# Patient Record
Sex: FEMALE | Race: WHITE | NOT HISPANIC OR LATINO | Employment: PART TIME | URBAN - METROPOLITAN AREA
[De-identification: names, ages, dates, MRNs, and addresses within clinical notes are randomized per-mention and may not be internally consistent; named-entity substitution may affect disease eponyms.]

---

## 2017-01-03 ENCOUNTER — OFFICE VISIT (OUTPATIENT)
Dept: FAMILY MEDICINE | Facility: CLINIC | Age: 47
End: 2017-01-03

## 2017-01-03 VITALS
OXYGEN SATURATION: 100 % | HEART RATE: 80 BPM | DIASTOLIC BLOOD PRESSURE: 80 MMHG | HEIGHT: 62 IN | TEMPERATURE: 97 F | SYSTOLIC BLOOD PRESSURE: 144 MMHG | RESPIRATION RATE: 22 BRPM | BODY MASS INDEX: 33.53 KG/M2 | WEIGHT: 182.2 LBS

## 2017-01-03 DIAGNOSIS — Z01.818 PREOP GENERAL PHYSICAL EXAM: ICD-10-CM

## 2017-01-03 DIAGNOSIS — K21.9 GASTROESOPHAGEAL REFLUX DISEASE, ESOPHAGITIS PRESENCE NOT SPECIFIED: ICD-10-CM

## 2017-01-03 DIAGNOSIS — E11.9 TYPE 2 DIABETES MELLITUS WITHOUT COMPLICATION, UNSPECIFIED LONG TERM INSULIN USE STATUS: Primary | ICD-10-CM

## 2017-01-03 DIAGNOSIS — I10 ESSENTIAL HYPERTENSION WITH GOAL BLOOD PRESSURE LESS THAN 140/90: ICD-10-CM

## 2017-01-03 DIAGNOSIS — F41.9 ANXIETY: ICD-10-CM

## 2017-01-03 DIAGNOSIS — E66.09 NON MORBID OBESITY DUE TO EXCESS CALORIES: ICD-10-CM

## 2017-01-03 DIAGNOSIS — M54.50 CHRONIC BILATERAL LOW BACK PAIN WITHOUT SCIATICA: ICD-10-CM

## 2017-01-03 DIAGNOSIS — I10 BENIGN ESSENTIAL HYPERTENSION: ICD-10-CM

## 2017-01-03 DIAGNOSIS — R07.89 ATYPICAL CHEST PAIN: ICD-10-CM

## 2017-01-03 DIAGNOSIS — E11.8 TYPE 2 DIABETES MELLITUS WITH COMPLICATION, WITHOUT LONG-TERM CURRENT USE OF INSULIN (H): ICD-10-CM

## 2017-01-03 DIAGNOSIS — G89.29 CHRONIC BILATERAL LOW BACK PAIN WITHOUT SCIATICA: ICD-10-CM

## 2017-01-03 LAB
BUN SERPL-MCNC: 8 MG/DL (ref 5–24)
CALCIUM SERPL-MCNC: 9.4 MG/DL (ref 8.5–10.4)
CHLORIDE SERPLBLD-SCNC: 101 MMOL/L (ref 94–109)
CO2 SERPL-SCNC: 25 MMOL/L (ref 20–32)
CREAT SERPL-MCNC: 0.7 MG/DL (ref 0.6–1.3)
EGFR CALCULATED (BLACK REFERENCE): 116.4 ML/MIN
EGFR CALCULATED (NON BLACK REFERENCE): 96.2 ML/MIN
ERYTHROCYTE [DISTWIDTH] IN BLOOD BY AUTOMATED COUNT: 14.6 % (ref 11–14.5)
GLUCOSE SERPL-MCNC: 203 MG/DL (ref 60–109)
HBA1C MFR BLD: 8 % (ref 4.1–5.7)
HCT VFR BLD AUTO: 33.2 % (ref 35–47)
HGB BLD-MCNC: 10.3 G/DL (ref 12–16)
MCH RBC QN AUTO: 26.2 PG (ref 27–34)
MCHC RBC AUTO-ENTMCNC: 31 G/DL (ref 32–36)
MCV RBC AUTO: 85 FL (ref 80–100)
PLATELET # BLD AUTO: 331 THOU/UL (ref 140–440)
PMV BLD AUTO: 11.2 FL (ref 8.5–12.5)
POTASSIUM SERPL-SCNC: 4.2 MMOL/L (ref 3.4–5.3)
RBC # BLD AUTO: 3.93 MILL/UL (ref 3.8–5.4)
SODIUM SERPL-SCNC: 138 MMOL/L (ref 133–144)
WBC # BLD AUTO: 8.5 THOU/UL (ref 4–11)

## 2017-01-03 RX ORDER — CYCLOBENZAPRINE HCL 5 MG
5-10 TABLET ORAL 2 TIMES DAILY PRN
Qty: 30 TABLET | Refills: 0 | Status: SHIPPED | OUTPATIENT
Start: 2017-01-03 | End: 2017-01-31

## 2017-01-03 NOTE — PATIENT INSTRUCTIONS
1. Please discontinue your aspirin 7 days before your surgery  2. Please continue taking your levothyroxine as scheduled, including on the day of your surgery  3. Please discontinue all other medications the night before your surgery and the morning of your surgery   Presurgery Checklist  You are scheduled to have surgery. The healthcare staff will try to make your stay comfortable. Use the guidelines below to remind yourself what to do before surgery. Be sure to follow any specific pre-op instructions from your surgeon or nurse.   Preparing for Surgery  Ask your surgeon if you ll need a blood transfusion during surgery and if so, how to prepare for it. In some cases, you can donate blood before surgery. If needed, this blood can be given back (transfused) to you during or after surgery.  If you are having abdominal surgery, ask what you need to do to clear your bowel.  Tell your surgeon if you have allergies to any medications or foods.  Arrange for an adult family member or friend to drive you home after surgery. If possible, have someone ready to help you at home as you recover.  Call the surgeon if you get a cold, fever, sore throat, diarrhea, or other health problem just before surgery. Your surgeon can decide whether or not to postpone the surgery.  Medications  Tell your surgeon about all medications you take, including prescription and over-the-counter products such as herbal remedies and vitamins. Ask if you should continue taking them.  If you take ibuprofen, naproxen, or  blood thinners  such as aspirin, clopidogrel (Plavix), or warfarin (Coumadin), ask your surgeon whether you should stop taking them and how long before surgery you should stop.  You may be told to take antibiotics just before surgery to prevent infection. If so, follow instructions carefully on how to take them.  If you are told to take medications called anticoagulants to prevent blood clots after surgery, be sure to follow the  instructions on how to take them.  Stop Smoking  If you smoke, healing may take longer. So at least 2 week(s) before surgery, stop smoking.  Bathing or Showering Before Surgery  If instructed, wash with antibacterial soap. Afterward, do not use lotions or powders.  If you are having surgery on the head, you may be asked to shampoo with antibacterial soap. Follow instructions for doing so.  Do Not Remove Hair from the Surgery Site  Do not shave hair from the incision site, unless you are given specific instructions to do so. Usually, if hair needs to be removed, it will be done at the hospital right before surgery.  Don t Eat or Drink  Your doctor will tell you when to stop eating and drinking. If you do not follow your doctor's instructions, your procedure may be postponed or rescheduled for another day.  If your surgeon tells you to continue any medications, take them with small sips of water.  You can brush your teeth and rinse your mouth, but don t swallow any water.  Day of Surgery  Do not wear makeup. Do not use perfume, deodorant, or hairspray. Remove nail polish and artificial nails.  Leave jewelry (including rings), watches, and other valuables at home.  Be sure to bring health insurance cards or forms and a photo ID.  Bring a list of your medications (include the name, dose, how often you take them, and the time last dose was taken).  Arrive on time at the hospital or surgery facility.

## 2017-01-03 NOTE — PROGRESS NOTES
[facility] [address] [place] [address]  Phone: [phone]  Fax: [phone]    1/3/2017    Adult PRE-OP Evaluation:    Lola Arias, 1970 presents for pre-operative evaluation and assessment as requested by Dr. Conor Shah, prior to undergoing surgery/procedure for treatment of menstral complication .      Proposed procedure: D&C    Date of Surgery/ Procedure: Friday January 6, 2017  Hospital/Surgical Facility: [facility]     Primary Physician: Palla, Misbah  Type of Anesthesia Anticipated: General  History of anesthesia complications: NONE  History of  abnormal bleeding: NONE   History of blood transfusions: NO  Patient has a Health Care Directive or Living Will:  YES     Preoperative Questions   1. YES - Do you have a history of heart attack, stroke, stent, bypass or surgery on an artery in the head, neck, heart or legs?   2. NO - Do you ever have any pain or discomfort in your chest?  3. NO - Have you ever had a severe pain across the front of your chest lasting for half an hour or more?  4. NO - Do you have a history of Congestive Heart Failure?  5. NO - Are you troubled by shortness of breath when: walking on the level/ up a slight hill/ at night?  6. NO - Does your chest ever sound wheezy or whistling?  7. NO - Do you currently have a cold, bronchitis or other respiratory infection?  8. NO - Have you had a cold, bronchitis or other respiratory infection within the last 2 weeks?  9. NO - Do you usually have a cough?  10. NO - Do you sometimes get pains in the calves of your legs when you walk?  11. NO - Do you or anyone in your family have previous history of blood clots?  12. NO - Do you or does anyone in your family have a serious bleeding problem such as prolonged bleeding following surgeries or cuts?  13. NO - Have you ever had problems with anemia or been told to take iron pills?  14. NO - Have you had any abnormal blood loss such as black, tarry or  bloody stools, or abnormal vaginal bleeding?  15. NO - Have you ever had a blood transfusion?  16. NO - Have you or any of your relatives ever had problems with anesthesia?  17. NO - Do you have sleep apnea, excessive snoring or daytime drowsiness?  18. NO - Do you have any prosthetic heart valves?  19. NO - Do you have prosthetic joints?  20. NO - Is there any chance that you may be pregnant?    Patient Active Problem List   Diagnosis     Chronic back pain     Alcohol dependence in remission (H)     Diabetes mellitus, type 2 (H)     Bipolar disorder (H)     Depression with anxiety     H/O tubal ligation     Hypothyroidism     GERD (gastroesophageal reflux disease)     Tobacco dependence syndrome     Irritable colon     Displacement of lumbar intervertebral disc     Peptic ulcer     Depressed mood     Obesity     Essential hypertension         Current Outpatient Prescriptions on File Prior to Visit:  metFORMIN (GLUCOPHAGE) 1000 MG tablet Take 1 tablet (1,000 mg) by mouth 2 times daily (with meals)   STATIN NOT PRESCRIBED, INTENTIONAL, Statin not prescribed intentionally due to Other LDL wnl, ASCVD <7.5% (This option does not exclude patient from measure)   oxyCODONE-acetaminophen (PERCOCET) 5-325 MG per tablet Take 1 tablet by mouth every 6 hours as needed for pain (maximum 4 tablet(s) per day)   ferrous sulfate (IRON) 325 (65 FE) MG tablet Take 1 tablet (325 mg) by mouth 2 times daily   omeprazole (PRILOSEC) 20 MG capsule Take 1 capsule (20 mg) by mouth daily   lisinopril (PRINIVIL,ZESTRIL) 10 MG tablet Take 1 tablet (10 mg) by mouth daily   citalopram (CELEXA) 20 MG tablet Take 1 tablet (20 mg) by mouth daily   cyclobenzaprine (FLEXERIL) 5 MG tablet Take 1-2 tablets (5-10 mg) by mouth 2 times daily as needed for muscle spasms   albuterol (PROAIR HFA, PROVENTIL HFA, VENTOLIN HFA) 108 (90 BASE) MCG/ACT inhaler Inhale 2 puffs into the lungs every 6 hours as needed for shortness of breath / dyspnea or wheezing  "  hydrochlorothiazide 12.5 MG TABS Take 1 tablet (12.5 mg) by mouth daily   aspirin 81 MG chewable tablet Take 1 tablet (81 mg) by mouth daily   nitroglycerin (NITROSTAT) 0.4 MG SL tablet Place 1 tablet (0.4 mg) under the tongue every 5 minutes as needed for chest pain If you are still having symptoms after 3 doses (15 minutes) call 911.     No current facility-administered medications on file prior to visit.    OTC products: None, except as noted above    Allergies   Allergen Reactions     Nkda [No Known Drug Allergies]      Latex Allergy: NO    Social History     Social History     Marital Status: Single     Spouse Name: N/A     Number of Children: N/A     Years of Education: N/A     Social History Main Topics     Smoking status: Current Every Day Smoker -- 0.50 packs/day     Types: Cigarettes     Smokeless tobacco: Never Used      Comment: Declined CIQ, not ready to quit. 3-11-16     Alcohol Use: No     Drug Use: No     Sexual Activity: Not Asked     Other Topics Concern     None     Social History Narrative       REVIEW OF SYSTEMS:   Constitutional, HEENT, cardiovascular, pulmonary, GI, , musculoskeletal, neuro, skin, endocrine and psych systems are negative, except as otherwise noted.    EXAM:   Patient Vitals for the past 24 hrs:   BP Temp Pulse Resp SpO2 Height Weight   01/03/17 1409 161/78 mmHg 97  F (36.1  C) 80 22 100 % 5' 2.25\" (158.1 cm) 182 lb 3.2 oz (82.645 kg)     Body mass index is 33.06 kg/(m^2).  GENERAL: healthy, alert and no distress  EYES: Eyes grossly normal to inspection, extraocular movements - intact, and PERRL  HENT: ear canals- normal; TMs- normal; Nose- normal; Mouth- no ulcers, no lesions  NECK: no tenderness, no adenopathy, no asymmetry, no masses, no stiffness; thyroid- normal to palpation  RESP: lungs clear to auscultation - no rales, no rhonchi, no wheezes  CV: regular rates and rhythm, normal S1 S2, no S3 or S4 and no murmur, no click or rub -  ABDOMEN: soft, no tenderness, no  " hepatosplenomegaly, no masses, normal bowel sounds  MS: extremities- no gross deformities noted, no edema  SKIN: no suspicious lesions, no rashes  NEURO: strength and tone- normal, sensory exam- grossly normal, mentation- intact, speech- normal, reflexes- symmetric  BACK: no CVA tenderness, no paralumbar tenderness  PSYCH: Alert and oriented times 3; speech- coherent , normal rate and volume; able to articulate logical thoughts  LYMPHATICS: ant. cervical- normal, post. cervical- normal    DIAGNOSTICS:      EKG: normal axis, normal intervals, no acute ST/T changes c/w ischemia, no LVH by voltage criteria, unchanged from previous tracings    RISK ASSESSMENT:     Cardiovascular Risk:  -Patient is able to do heavy housework without chest pain.  -The patient does not have chest pain with exertion.  -Patient does not have a history of congestive heart failure.    -The patient does not have a history of stroke and does not have a history of valvular disease.    Pulmonary Risk:  -In terms of risk factors for pulmonary complication, the patient has no risk factors    Perioperative Complications:  -The patient does not have a history of bleeding or clotting problems in the past.    -The patient has not had complications from surgeries.    -The patient does not have a family history of any anesthesia or surgical complications.      IMPRESSION:   Reason for surgery/procedure:   Dilation and curettage     The proposed surgical procedure is considered INTERMEDIATE risk.    For above listed surgery and anesthesia:   Patient is at low risk for surgery/procedure and perioperative/procedure complications.    RECOMMENDATIONS:     Labs:  Hgb and A1c    Fasting:  NPO for 12 hours prior to surgery    Preop Plan:  --Approval given to proceed with proposed procedure, without further diagnostic evaluation    Medications:  Patient should hold their regular medications the morning of surgery unless otherwise instructed.    Hold aspirin 7 days  prior to surgery.  Hold ibuprofen for 5 days prior.  Hold All regualr, lispro/humalog, aspart/novolog, glulisine/apirda insulin the morning of surgery.      Misbah Palla, MD    Please contact our office if there are any further questions or information required about this patient.

## 2017-01-03 NOTE — Clinical Note
RETURN TO WORK/SCHOOL FORM    1/3/2017    Re: Lola Arias  1970      To Whom It May Concern:     Lola Arias was seen in clinic today..  She may return to work without restrictions.          Restrictions:  None      Misbah Palla, MD  1/3/2017 2:45 PM

## 2017-01-03 NOTE — MR AVS SNAPSHOT
After Visit Summary   1/3/2017    Lola Arias    MRN: [id number]           Patient Information     Date Of Birth          1970        Visit Information        Provider Department      1/3/2017 2:00 PM Palla, Misbah, MD [facility]        Today's Diagnoses     Type 2 diabetes mellitus without complication, unspecified long term insulin use status (H)    -  1     Chronic bilateral low back pain without sciatica         Gastroesophageal reflux disease, esophagitis presence not specified         Atypical chest pain         Anxiety         Benign essential hypertension         Non morbid obesity due to excess calories         Type 2 diabetes mellitus with complication (H)         Essential hypertension with goal blood pressure less than 140/90         Preop general physical exam           Care Instructions    1. Please discontinue your aspirin 7 days before your surgery  2. Please continue taking your levothyroxine as scheduled, including on the day of your surgery  3. Please discontinue all other medications the night before your surgery and the morning of your surgery   Presurgery Checklist  You are scheduled to have surgery. The healthcare staff will try to make your stay comfortable. Use the guidelines below to remind yourself what to do before surgery. Be sure to follow any specific pre-op instructions from your surgeon or nurse.   Preparing for Surgery  Ask your surgeon if you ll need a blood transfusion during surgery and if so, how to prepare for it. In some cases, you can donate blood before surgery. If needed, this blood can be given back (transfused) to you during or after surgery.  If you are having abdominal surgery, ask what you need to do to clear your bowel.  Tell your surgeon if you have allergies to any medications or foods.  Arrange for an adult family member or friend to drive you home after surgery. If possible, have someone ready to help you at home as you  recover.  Call the surgeon if you get a cold, fever, sore throat, diarrhea, or other health problem just before surgery. Your surgeon can decide whether or not to postpone the surgery.  Medications  Tell your surgeon about all medications you take, including prescription and over-the-counter products such as herbal remedies and vitamins. Ask if you should continue taking them.  If you take ibuprofen, naproxen, or  blood thinners  such as aspirin, clopidogrel (Plavix), or warfarin (Coumadin), ask your surgeon whether you should stop taking them and how long before surgery you should stop.  You may be told to take antibiotics just before surgery to prevent infection. If so, follow instructions carefully on how to take them.  If you are told to take medications called anticoagulants to prevent blood clots after surgery, be sure to follow the instructions on how to take them.  Stop Smoking  If you smoke, healing may take longer. So at least 2 week(s) before surgery, stop smoking.  Bathing or Showering Before Surgery  If instructed, wash with antibacterial soap. Afterward, do not use lotions or powders.  If you are having surgery on the head, you may be asked to shampoo with antibacterial soap. Follow instructions for doing so.  Do Not Remove Hair from the Surgery Site  Do not shave hair from the incision site, unless you are given specific instructions to do so. Usually, if hair needs to be removed, it will be done at the hospital right before surgery.  Don t Eat or Drink  Your doctor will tell you when to stop eating and drinking. If you do not follow your doctor's instructions, your procedure may be postponed or rescheduled for another day.  If your surgeon tells you to continue any medications, take them with small sips of water.  You can brush your teeth and rinse your mouth, but don t swallow any water.  Day of Surgery  Do not wear makeup. Do not use perfume, deodorant, or hairspray. Remove nail polish and  "artificial nails.  Leave jewelry (including rings), watches, and other valuables at home.  Be sure to bring health insurance cards or forms and a photo ID.  Bring a list of your medications (include the name, dose, how often you take them, and the time last dose was taken).  Arrive on time at the hospital or surgery facility.        Follow-ups after your visit        Who to contact     Please call your clinic at [phone] to:    Ask questions about your health    Make or cancel appointments    Discuss your medicines    Learn about your test results    Speak to your doctor   If you have compliments or concerns about an experience at your clinic, or if you wish to file a complaint, please contact [facility] Patient Relations at [phone] or email us at [email or web address]         Additional Information About Your Visit        Care EveryWhere ID     This is your Care EveryWhere ID. This could be used by other organizations to access your [facility] medical records  [id number]        Your Vitals Were     Pulse Temperature Respirations Height BMI (Body Mass Index) Pulse Oximetry    80 97  F (36.1  C) 22 5' 2.25\" (158.1 cm) 33.06 kg/m2 100%       Blood Pressure from Last 3 Encounters:   01/03/17 144/80   10/28/16 132/85   07/15/16 118/80    Weight from Last 3 Encounters:   01/03/17 182 lb 3.2 oz (82.645 kg)   10/28/16 183 lb (83.008 kg)   07/15/16 183 lb 3.2 oz (83.099 kg)              We Performed the Following     Basic Metabolic Panel ([facility]) - Results < 1 hr     CBC with Plt ([facility])     Hemoglobin A1c ([facility])          Where to get your medicines      These medications were sent to [facility] [address] [place] [address] [address] [facility] OF [address] [facility] [address]    Hours:  24-hours Phone:  [phone]    - cyclobenzaprine 5 MG tablet  - metFORMIN 1000 MG tablet  - omeprazole 20 MG CR capsule       Primary Care " Provider Office Phone # Fax #    Misbah Palla, [phone]        [facility] [address] [place] [address]        Thank you!     Thank you for choosing [facility]  for your care. Our goal is always to provide you with excellent care. Hearing back from our patients is one way we can continue to improve our services. Please take a few minutes to complete the written survey that you may receive in the mail after your visit with us. Thank you!             Your Updated Medication List - Protect others around you: Learn how to safely use, store and throw away your medicines at www.disposemymeds.org.          This list is accurate as of: 1/3/17  2:50 PM.  Always use your most recent med list.                   Brand Name Dispense Instructions for use    albuterol 108 (90 BASE) MCG/ACT Inhaler    PROAIR HFA/PROVENTIL HFA/VENTOLIN HFA    1 Inhaler    Inhale 2 puffs into the lungs every 6 hours as needed for shortness of breath / dyspnea or wheezing       aspirin 81 MG chewable tablet     108 tablet    Take 1 tablet (81 mg) by mouth daily       citalopram 20 MG tablet    celeXA    30 tablet    Take 1 tablet (20 mg) by mouth daily       cyclobenzaprine 5 MG tablet    FLEXERIL    30 tablet    Take 1-2 tablets (5-10 mg) by mouth 2 times daily as needed for muscle spasms       ferrous sulfate 325 (65 FE) MG tablet    IRON    60 tablet    Take 1 tablet (325 mg) by mouth 2 times daily       hydrochlorothiazide 12.5 MG Tabs tablet     30 tablet    Take 1 tablet (12.5 mg) by mouth daily       lisinopril 10 MG tablet    PRINIVIL/ZESTRIL    90 tablet    Take 1 tablet (10 mg) by mouth daily       metFORMIN 1000 MG tablet    GLUCOPHAGE    180 tablet    Take 1 tablet (1,000 mg) by mouth 2 times daily (with meals)       nitroglycerin 0.4 MG sublingual tablet    NITROSTAT    25 tablet    Place 1 tablet (0.4 mg) under the tongue every 5 minutes as needed for chest pain If you are still having  symptoms after 3 doses (15 minutes) call 911.       omeprazole 20 MG CR capsule    priLOSEC    30 capsule    Take 1 capsule (20 mg) by mouth daily       oxyCODONE-acetaminophen 5-325 MG per tablet    PERCOCET    18 tablet    Take 1 tablet by mouth every 6 hours as needed for pain (maximum 4 tablet(s) per day)       STATIN NOT PRESCRIBED (INTENTIONAL)     0 each    Statin not prescribed intentionally due to Other LDL wnl, ASCVD <7.5% (This option does not exclude patient from measure)

## 2017-01-04 ASSESSMENT — PATIENT HEALTH QUESTIONNAIRE - PHQ9: SUM OF ALL RESPONSES TO PHQ QUESTIONS 1-9: 12

## 2017-01-06 ENCOUNTER — TRANSFERRED RECORDS (OUTPATIENT)
Dept: HEALTH INFORMATION MANAGEMENT | Facility: CLINIC | Age: 47
End: 2017-01-06

## 2017-01-09 DIAGNOSIS — I10 ESSENTIAL HYPERTENSION: Primary | ICD-10-CM

## 2017-01-09 RX ORDER — HYDROCHLOROTHIAZIDE 12.5 MG/1
12.5 TABLET ORAL DAILY
Qty: 30 TABLET | Refills: 1 | Status: SHIPPED
Start: 2017-01-09 | End: 2017-04-15

## 2017-01-09 NOTE — PROGRESS NOTES
Quick Note:    Spoke to pt, gave results. Pt does not know what her work schedule is going to be like so she will call back to make a DM appointment.  ______

## 2017-01-31 DIAGNOSIS — R07.89 ATYPICAL CHEST PAIN: ICD-10-CM

## 2017-01-31 DIAGNOSIS — M54.50 CHRONIC BILATERAL LOW BACK PAIN WITHOUT SCIATICA: Primary | ICD-10-CM

## 2017-01-31 DIAGNOSIS — G89.29 CHRONIC BILATERAL LOW BACK PAIN WITHOUT SCIATICA: Primary | ICD-10-CM

## 2017-01-31 DIAGNOSIS — E66.09 NON MORBID OBESITY DUE TO EXCESS CALORIES: ICD-10-CM

## 2017-01-31 DIAGNOSIS — K21.9 GASTROESOPHAGEAL REFLUX DISEASE, ESOPHAGITIS PRESENCE NOT SPECIFIED: ICD-10-CM

## 2017-01-31 DIAGNOSIS — E11.8 TYPE 2 DIABETES MELLITUS WITH COMPLICATION (H): ICD-10-CM

## 2017-01-31 DIAGNOSIS — I10 BENIGN ESSENTIAL HYPERTENSION: ICD-10-CM

## 2017-01-31 DIAGNOSIS — I10 ESSENTIAL HYPERTENSION WITH GOAL BLOOD PRESSURE LESS THAN 140/90: ICD-10-CM

## 2017-01-31 DIAGNOSIS — I10 ESSENTIAL HYPERTENSION: ICD-10-CM

## 2017-01-31 DIAGNOSIS — F41.9 ANXIETY: ICD-10-CM

## 2017-01-31 RX ORDER — LISINOPRIL 10 MG/1
10 TABLET ORAL DAILY
Qty: 90 TABLET | Refills: 0 | Status: SHIPPED | OUTPATIENT
Start: 2017-01-31 | End: 2017-02-28

## 2017-01-31 RX ORDER — CYCLOBENZAPRINE HCL 5 MG
5-10 TABLET ORAL 2 TIMES DAILY PRN
Qty: 30 TABLET | Refills: 0 | Status: SHIPPED | OUTPATIENT
Start: 2017-01-31 | End: 2021-05-06

## 2017-01-31 RX ORDER — ASPIRIN 81 MG/1
81 TABLET, CHEWABLE ORAL DAILY
Qty: 108 TABLET | Refills: 0 | Status: SHIPPED | OUTPATIENT
Start: 2017-01-31 | End: 2017-06-05

## 2017-01-31 NOTE — PROGRESS NOTES
Preceptor Attestation:  Patient's case reviewed and discussed with Misbah Palla, MD Patient seen and discussed with the resident.. I agree with assessment and plan of care.  Supervising Physician:  Miah Kraft MD  [facility]

## 2017-02-06 DIAGNOSIS — K21.9 GASTROESOPHAGEAL REFLUX DISEASE, ESOPHAGITIS PRESENCE NOT SPECIFIED: Primary | ICD-10-CM

## 2017-02-28 DIAGNOSIS — K21.9 GASTROESOPHAGEAL REFLUX DISEASE, ESOPHAGITIS PRESENCE NOT SPECIFIED: ICD-10-CM

## 2017-02-28 DIAGNOSIS — F41.9 ANXIETY: ICD-10-CM

## 2017-02-28 DIAGNOSIS — R07.89 ATYPICAL CHEST PAIN: ICD-10-CM

## 2017-02-28 DIAGNOSIS — I10 BENIGN ESSENTIAL HYPERTENSION: ICD-10-CM

## 2017-02-28 DIAGNOSIS — E66.09 NON MORBID OBESITY DUE TO EXCESS CALORIES: ICD-10-CM

## 2017-02-28 DIAGNOSIS — I10 ESSENTIAL HYPERTENSION WITH GOAL BLOOD PRESSURE LESS THAN 140/90: ICD-10-CM

## 2017-02-28 RX ORDER — LISINOPRIL 10 MG/1
10 TABLET ORAL DAILY
Qty: 90 TABLET | Refills: 1 | Status: SHIPPED | OUTPATIENT
Start: 2017-02-28 | End: 2017-05-01

## 2017-02-28 NOTE — TELEPHONE ENCOUNTER
[facility] phone call message- patient requesting a refill:    Full Medication Name: omeprazole, lisinopril      Pharmacy confirmed as   [facility] [address] [place] [address] [place], [place] - [address] [facility] [place] [address] [address] [place] [address]  Phone: [phone] Fax: [phone]  : Yes    Additional Comments: pt reports being completely out and having severe heart burn     OK to leave a message on voice mail? Yes    Primary language: English      needed? No    Call taken on February 28, 2017 at 9:27 AM by Patria Anaya

## 2017-03-06 ENCOUNTER — COMMUNICATION - HEALTHEAST (OUTPATIENT)
Dept: SCHEDULING | Facility: CLINIC | Age: 47
End: 2017-03-06

## 2017-03-09 ENCOUNTER — RECORDS - HEALTHEAST (OUTPATIENT)
Dept: ADMINISTRATIVE | Facility: OTHER | Age: 47
End: 2017-03-09

## 2017-03-09 ENCOUNTER — OFFICE VISIT (OUTPATIENT)
Dept: FAMILY MEDICINE | Facility: CLINIC | Age: 47
End: 2017-03-09

## 2017-03-09 VITALS
OXYGEN SATURATION: 100 % | BODY MASS INDEX: 31.65 KG/M2 | HEIGHT: 62 IN | WEIGHT: 172 LBS | SYSTOLIC BLOOD PRESSURE: 121 MMHG | DIASTOLIC BLOOD PRESSURE: 68 MMHG | TEMPERATURE: 97.3 F | HEART RATE: 99 BPM

## 2017-03-09 DIAGNOSIS — E11.8 TYPE 2 DIABETES MELLITUS WITH COMPLICATION, WITHOUT LONG-TERM CURRENT USE OF INSULIN (H): ICD-10-CM

## 2017-03-09 DIAGNOSIS — S06.0X0A CONCUSSION, WITHOUT LOSS OF CONSCIOUSNESS, INITIAL ENCOUNTER: Primary | ICD-10-CM

## 2017-03-09 DIAGNOSIS — H73.892 TYMPANIC MEMBRANE RETRACTION, LEFT: ICD-10-CM

## 2017-03-09 RX ORDER — OXYMETAZOLINE HYDROCHLORIDE 0.05 G/100ML
2-3 SPRAY NASAL 2 TIMES DAILY PRN
Qty: 1 BOTTLE | Refills: 0 | Status: SHIPPED | OUTPATIENT
Start: 2017-03-09 | End: 2017-05-17

## 2017-03-09 NOTE — PROGRESS NOTES
SUBJECTIVE:  This is a 46-year-old female with a history of chronic back pain, hypothyroidism, hypertension and alcoholism in remission, who presents 5 days after a fall.  She was sitting at a bar with her boyfriend and that she had hop up for stool to give her boyfriend hug she slipped and fell and hit the back of her head on the brass rail under the bar.  She had not been drinking any alcohol, but just lost her footing.  She thinks she may have lost consciousness briefly when she woke up she had a nosebleed.  She has had pain in her left ear and near her left upper jaw since that time.  She sometimes finds it difficult to sustain her concentration at work and she thought her vision may have been blurry right after the injury but has been normal since that time.  She has been using Norco she had left over from a D&C procedure in January and 1 pill at night due to the headache and left ear discomfort.  She has not sought medical evaluation.  Today she has no visual complaints.  She does not have a headache now, but it does come on and off and seems to bother her more in the evening.  She has been able chew with a normal appetite, no nausea or vomiting.  No, dizziness, lightheadedness or orthostatic symptoms.  No bowel or bladder dysfunction, no neck pain.  No other injuries, no pain in her shoulders, wrists, arms, hands and lower extremities.   REVIEW OF SYSTEMS:  She has not had recent respiratory symptoms.  Remainder of the review of systems is outlined above.   PHYSICAL EXAMINATION:   VITAL SIGNS:  As above.   GENERAL:  She is alert, no distress, relates her own history, she is here with her daughter.   HEENT:  Head is normocephalic and no signs of trauma.  The skin of his scalp appears normal.  There are no palpable swellings.  Nose exam is unremarkable.  Her left tympanic membrane is retracted and there is a small  amount of tympanic membrane injection.  The right TM is normal, palpation of the jaw is  unremarkable, she can fully open and close her jaw without pain.  There is no clicking.  No tonsillar hypertrophy or exudate.   NECK:  Supple without lymphadenopathy.  There is no cervical tenderness.  She can turn either side without discomfort.   CARDIOVASCULAR:  Regular rate and rhythm without murmur.   LUNGS:  Clear to auscultation bilaterally.   EXTREMITIES:  Free of gross deformity.   ASSESSMENT/PLAN:     1.  Concussion:  We discussed various evaluation and treatment options.  She consents to a head CT as she had a fall from her own height with potential loss of consciousness and ongoing headache now for 5 days.  This will be set up for some time, likely in the next 24 hours.  I think the likelihood of intracerebral hemorrhage or other significant injuries low but will evaluate.   2.  I recommend not using opioid muscle relaxers for this pain.  Recommend reducing the amount of time she spends in front of a screen and reading.  I gave her report of workability so that she may look away from her screen for a third of every hour over the next 2 weeks.  I expect slow resolution of her symptoms.  If she has any worsening of her headache, any new neurologic symptoms or other new concerns she should seek immediate reevaluation.   3.  Left ear discomfort.  No signs of trauma and it seems at this point unlikely related to her fall directly.  She does have a clear ear retraction recommend a trial of Afrin nasal spray for eustachian tube dysfunction.  I expect resolution over the next 3-5 days   4.  She return for reevaluation no later than 2 weeks, earlier if any new symptoms.

## 2017-03-09 NOTE — MR AVS SNAPSHOT
After Visit Summary   3/9/2017    Lola Arias    MRN: [id number]           Patient Information     Date Of Birth          1970        Visit Information        Provider Department      3/9/2017 2:40 PM Oleg Patino MD [facility]        Today's Diagnoses     Type 2 diabetes mellitus with complication, without long-term current use of insulin (H)    -  1    Concussion, without loss of consciousness, initial encounter          Care Instructions    - Nasal spray to each nostril 2 times daily for 3 days and then stop.    Your medication list is printed, please keep this with you, it is helpful to bring this current list to any other medical appointments, the emergency room or hospital.    If you had lab testing today and your results are reassuring or normal they will be be mailed to you within 7 days.     If the lab tests need quick action we will call you with the results.   The phone number we will call with results is # [phone] (home) . If this is not the best number please call our clinic and change the number.    If you need any refills please call your pharmacy and they will contact us.    If you have any further concerns or wish to schedule another appointment you must call our office during normal business hours  [phone] (8-5:00 M-F)  If you have urgent medical questions that cannot wait  you may also call [phone] at any time of day.  If you have a medical emergency please call [phone].      Thank you for coming to [facility].    Referral for ( TEST )  :  Ophthalmology  LOCATION/PLACE/Provider : [facility] location  DATE & TIME : 3/31/2017 at 2:00pm with Dr. Casillas  PHONE :  [phone]  ADDITIONAL INFORMATION :  Appointment date and time card given to patient  Appointment made by clinic staff/:  ---CTruy,ZAK                Follow-ups after your visit        Additional Services     OPHTHALMOLOGY ADULT REFERRAL       [facility]  "[place]    Diagnosis/Reason for Referral:  DM routine eye exam     needed: No  Language: English    May leave message on voicemail: Yes    Referral should be tracked? Yes                  Future tests that were ordered for you today     Open Future Orders        Priority Expected Expires Ordered    CT HEAD W CONTRAST Routine  3/9/2018 3/9/2017            Who to contact     Please call your clinic at [phone] to:    Ask questions about your health    Make or cancel appointments    Discuss your medicines    Learn about your test results    Speak to your doctor   If you have compliments or concerns about an experience at your clinic, or if you wish to file a complaint, please contact [facility] Patient Relations at [phone] or email us at [email or web address]         Additional Information About Your Visit        [employer] Information     [employer] is an electronic gateway that provides easy, online access to your medical records. With [employer], you can request a clinic appointment, read your test results, renew a prescription or communicate with your care team.     To sign up for [employer] visit the website at [email or web address]   You will be asked to enter the access code listed below, as well as some personal information. Please follow the directions to create your username and password.     Your access code is: [email or web address]  Expires: 2017  4:01 PM     Your access code will  in 90 days. If you need help or a new code, please contact your [facility] or call [phone] for assistance.        Care EveryWhere ID     This is your Care EveryWhere ID. This could be used by other organizations to access your [facility] medical records  [id number]        Your Vitals Were     Pulse Temperature Height Pulse Oximetry BMI (Body Mass Index)       99 97.3  F (36.3  C) (Oral) 5' 2.4\" (158.5 cm) 100% 31.06 kg/m2        Blood " Pressure from Last 3 Encounters:   03/09/17 121/68   01/03/17 144/80   10/28/16 132/85    Weight from Last 3 Encounters:   03/09/17 172 lb (78 kg)   01/03/17 182 lb 3.2 oz (82.6 kg)   10/28/16 183 lb (83 kg)              We Performed the Following     OPHTHALMOLOGY ADULT REFERRAL        Primary Care Provider Office Phone # Fax #    Misbah Palla, [phone]       [facility] [address] [place] [address]        Thank you!     Thank you for choosing [facility]  for your care. Our goal is always to provide you with excellent care. Hearing back from our patients is one way we can continue to improve our services. Please take a few minutes to complete the written survey that you may receive in the mail after your visit with us. Thank you!             Your Updated Medication List - Protect others around you: Learn how to safely use, store and throw away your medicines at www.disposemymeds.org.          This list is accurate as of: 3/9/17  4:01 PM.  Always use your most recent med list.                   Brand Name Dispense Instructions for use    albuterol 108 (90 BASE) MCG/ACT Inhaler    PROAIR HFA/PROVENTIL HFA/VENTOLIN HFA    1 Inhaler    Inhale 2 puffs into the lungs every 6 hours as needed for shortness of breath / dyspnea or wheezing       aspirin 81 MG chewable tablet     108 tablet    Take 1 tablet (81 mg) by mouth daily       citalopram 20 MG tablet    celeXA    30 tablet    Take 1 tablet (20 mg) by mouth daily       cyclobenzaprine 5 MG tablet    FLEXERIL    30 tablet    Take 1-2 tablets (5-10 mg) by mouth 2 times daily as needed for muscle spasms       ferrous sulfate 325 (65 FE) MG tablet    IRON    60 tablet    Take 1 tablet (325 mg) by mouth 2 times daily       hydrochlorothiazide 12.5 MG Tabs tablet     30 tablet    Take 1 tablet (12.5 mg) by mouth daily       lisinopril 10 MG tablet    PRINIVIL/ZESTRIL    90 tablet    Take 1 tablet (10 mg) by mouth daily        metFORMIN 1000 MG tablet    GLUCOPHAGE    180 tablet    Take 1 tablet (1,000 mg) by mouth 2 times daily (with meals)       nitroglycerin 0.4 MG sublingual tablet    NITROSTAT    25 tablet    Place 1 tablet (0.4 mg) under the tongue every 5 minutes as needed for chest pain If you are still having symptoms after 3 doses (15 minutes) call 911.       omeprazole 20 MG CR capsule    priLOSEC    30 capsule    Take 1 capsule (20 mg) by mouth daily       oxyCODONE-acetaminophen 5-325 MG per tablet    PERCOCET    18 tablet    Take 1 tablet by mouth every 6 hours as needed for pain (maximum 4 tablet(s) per day)       STATIN NOT PRESCRIBED (INTENTIONAL)     0 each    Statin not prescribed intentionally due to Other LDL wnl, ASCVD <7.5% (This option does not exclude patient from measure)

## 2017-03-09 NOTE — PATIENT INSTRUCTIONS
- Nasal spray to each nostril 2 times daily for 3 days and then stop.    Your medication list is printed, please keep this with you, it is helpful to bring this current list to any other medical appointments, the emergency room or hospital.    If you had lab testing today and your results are reassuring or normal they will be be mailed to you within 7 days.     If the lab tests need quick action we will call you with the results.   The phone number we will call with results is # [phone] (home) . If this is not the best number please call our clinic and change the number.    If you need any refills please call your pharmacy and they will contact us.    If you have any further concerns or wish to schedule another appointment you must call our office during normal business hours  [phone] (8-5:00 M-F)  If you have urgent medical questions that cannot wait  you may also call [phone] at any time of day.  If you have a medical emergency please call 911.      Thank you for coming to [facility].    Referral for ( TEST )  :  Ophthalmology  LOCATION/PLACE/Provider : [facility] location  DATE & TIME : 3/31/2017 at 2:00pm with Dr. Casillas  PHONE :  [phone]  ADDITIONAL INFORMATION :  Appointment date and time card given to patient  Appointment made by clinic staff/:  ---ZAK [facility]        Referral for ( TEST )  :      CT Head W contrast  LOCATION/PLACE/Provider :    [address] [address]  DATE & TIME :     3-10-17 at Noon  PHONE :     [phone]  FAX :     [phone]  ADDITIONAL INFORMATION :     NPO 2 hrs before appointment time  Appointment made by clinic staff/:    Joon

## 2017-03-09 NOTE — LETTER
[facility] [address] [place] [address]  [phone]    REPORT OF WORK ABILITY    Date: 3/9/2017                     Employee Name: Lola Arias         YOB: 1970  Medical Record Number: [id number]   Soc.Sec.No: [id number]    Diagnosis: Concussion  Work Related: no       EMPLOYEE IS ABLE TO WORK: OFF work for today     RESTRICTIONS From 03/09/2017-03/23/17, Return to work no restrictions 03/24/17:   Stand: Frequently (4-6 hours)   Sit: Continuously (6-8 hours)   Walk: Frequently (4-6 hours)   Kneel/[place]: Frequently (4-6 hours)      Lift, carry no more than:  20 - 50 lb. Occasionally (2-4 hours)   May use hands repetitively for:    Simple grasping: yes      Pushing/pulling: yes    Fine manipulation:  yes    Firm grasping:  yes     Operate Foot controls:  Right foot: yes   Left foot: yes     Bend:  Occasionally (2-4 hours)     Stoop: Occasionally (2-4 hours)    Twist: Occasionally (2-4 hours)     Reach Above Shoulders: Occasionally (2-4 hours)    OTHER RESTRICTIONS: SHE MAY ONLY VIEW COMPUTER SCREEN 40 MINUTES OF EVERY HOUR.  20 MINUTES OF EVERY HOURS SPENT AWAY FROM LOOKING AT COMPUTER SCREEN/CLOSE READING.    TREATMENT PLAN/NOTES: Return to clinic in 2 weeks if symptoms not resolved.      Oleg Patino MD

## 2017-03-09 NOTE — NURSING NOTE
DUE FOR:  Eye Exam referral--- placed order.   Labs due:Microalbumin  DM Foot exam  PHQ-9-- not given due to current pain issues causing patient to not feel like doing anything.   Cervical Cancer Screening--- Pt informed and will come back at future date.   Pneumococcal 23 vaccine--- defer until next time.

## 2017-03-09 NOTE — PROGRESS NOTES
"3/3/17  At bar with boyfriend, fell backward off of stool, hit back of head on foot thing under bar and then on concrete  Thinks lost consciousness x1 minute, got a nose bleed  Headaches since then, left ear pain and jaw pain   Hard time focusing, \"spacey\"   No dizziness or lightheadedness   Blurry vision on Monday, better now   Wasn't drinking that night, sober now for 3/30/2014, used to drink 18-24 per night   Using pain medicine - Norco - 1 pill/day, combines with muscle relaxer     "

## 2017-03-10 ENCOUNTER — HOSPITAL ENCOUNTER (OUTPATIENT)
Dept: CT IMAGING | Facility: HOSPITAL | Age: 47
Discharge: HOME | End: 2017-03-10
Attending: FAMILY MEDICINE

## 2017-03-10 DIAGNOSIS — S06.0XAA CONCUSSION: ICD-10-CM

## 2017-04-07 ENCOUNTER — TRANSFERRED RECORDS (OUTPATIENT)
Dept: HEALTH INFORMATION MANAGEMENT | Facility: CLINIC | Age: 47
End: 2017-04-07

## 2017-04-10 DIAGNOSIS — S06.0X0A CONCUSSION, WITHOUT LOSS OF CONSCIOUSNESS, INITIAL ENCOUNTER: ICD-10-CM

## 2017-04-10 NOTE — LETTER
April 14, 2017      Lola Arias  [address] [facility] [address]        Dear Lola,    Your head CT on 03/10/17 was normal.    If you have any questions, please call the clinic to make an appointment.    Sincerely,    ARGELIA Levine

## 2017-04-15 DIAGNOSIS — E11.9 TYPE 2 DIABETES MELLITUS WITHOUT COMPLICATION, UNSPECIFIED LONG TERM INSULIN USE STATUS: ICD-10-CM

## 2017-04-15 DIAGNOSIS — I10 ESSENTIAL HYPERTENSION: ICD-10-CM

## 2017-04-15 RX ORDER — HYDROCHLOROTHIAZIDE 12.5 MG/1
12.5 TABLET ORAL DAILY
Qty: 30 TABLET | Refills: 1 | Status: SHIPPED | OUTPATIENT
Start: 2017-04-15 | End: 2017-05-17

## 2017-04-15 NOTE — PROGRESS NOTES
Clinic Triage Call:  Pt called stating she was completely out of her metformin 1000mg BID and HCTZ 12.5mg daily. Reviewed chart and she is due for refills. She would like these started today as opposed to waiting until Monday. Refills sent by e-prescribe now.     She is aware she needs to come in for a physical exam in the next 1-2 months.    Geri Sutton MD

## 2017-04-19 ENCOUNTER — OFFICE VISIT (OUTPATIENT)
Dept: FAMILY MEDICINE | Facility: CLINIC | Age: 47
End: 2017-04-19

## 2017-04-19 VITALS
HEART RATE: 86 BPM | WEIGHT: 170 LBS | DIASTOLIC BLOOD PRESSURE: 87 MMHG | OXYGEN SATURATION: 100 % | SYSTOLIC BLOOD PRESSURE: 134 MMHG | TEMPERATURE: 98 F | BODY MASS INDEX: 31.28 KG/M2 | HEIGHT: 62 IN

## 2017-04-19 DIAGNOSIS — J06.9 VIRAL URI WITH COUGH: Primary | ICD-10-CM

## 2017-04-19 DIAGNOSIS — J98.01 ACUTE BRONCHOSPASM: ICD-10-CM

## 2017-04-19 RX ORDER — ALBUTEROL SULFATE 90 UG/1
2 AEROSOL, METERED RESPIRATORY (INHALATION) EVERY 6 HOURS PRN
Qty: 1 INHALER | Refills: 1 | Status: SHIPPED | OUTPATIENT
Start: 2017-04-19 | End: 2018-02-07

## 2017-04-19 NOTE — PATIENT INSTRUCTIONS
~ Will send a refill of your Albuterol inhaler.   ~Take 2 puffs three times a day for 5 days then you can resume to using this as needed.   ~You can take Robitussin to help with your cough.   Your medication list is printed, please keep this with you, it is helpful to bring this current list to any other medical appointments, the emergency room or hospital.    If you had lab testing today and your results are reassuring or normal they will be be mailed to you within 7 days.     If the lab tests need quick action we will call you with the results.   The phone number we will call with results is # [phone] (home) . If this is not the best number please call our clinic and change the number.    If you need any refills please call your pharmacy and they will contact us.    If you have any further concerns or wish to schedule another appointment you must call our office during normal business hours  [phone] (8-5:00 M-F)  If you have urgent medical questions that cannot wait  you may also call [phone] at any time of day.  If you have a medical emergency please call [phone].    Thank you for coming to [facility].

## 2017-04-19 NOTE — PROGRESS NOTES
SUBJECTIVE:  This is a 46-year-old female with type 2 diabetes, alcohol dependence in remission, bipolar affective disorder, hypothyroidism who presents with 2 days of cough.  She was in her usual state of health which includes an occasional intermittent cough until 2 days ago.  She reports that her cat and a family member developed a runny nose and cough over the past few days and now she seems to have the same cough.  She has had some clear runny nose and no other associated symptoms.  Upon review of systems she does endorse she may have had chills 2 nights ago.  No body aches, joint pain, sore throat, ear pain, facial pain, headache, bruising or bleeding.  She has reduced her cigarette smoking to 3 cigarettes a day today.   REVIEW OF SYSTEMS:  No fever, no shortness of breath, no orthopnea.  No change in exercise tolerance.  The remainder of the review of systems is outlined above.   PHYSICAL EXAMINATION:   VITAL SIGNS:  As above.   GENERAL:  She is alert, no distress.   HEENT:  Head is normocephalic and atraumatic.  Eyes, sclerae are nonicteric, noninjected.  Tympanic membranes are white with normal bony and light landmarks.  She has swelling of the inferior nasal turbinates with clear rhinorrhea.  She has mild erythema of the posterior oropharynx without tonsillar hypertrophy.   NECK:  Supple without lymphadenopathy.   CARDIOVASCULAR:  Regular rate and rhythm without murmur.   LUNGS:  Clear with fair air exchange bilaterally.   ABDOMEN:  Soft and nontender.   EXTREMITIES:  Free of edema.   ASSESSMENT AND PLAN:   1.  Viral upper respiratory infection:  Recommended symptomatic cares.  She has an albuterol inhaler previously prescribed although she denies any history of asthma.  She denies episodes of cough or wheezing, other than around times when she is sick.  She may use her Albuterol inhaler 2 puffs 3 times daily over the next few days until her upper respiratory infection clears.  I anticipate spontaneous  resolution over the next 5-7 days of her acute symptoms and the cough to be completely resolved within 2-3 weeks.  If she has any worsening or cough that extends beyond 3 weeks she should return for reevaluation.  Recommend smoking cessation as the foundation of reducing and preventing long-term respiratory symptoms.   2.  Tobacco abuse:  Recommend tobacco cessation and offered smoking cessation options.

## 2017-04-19 NOTE — NURSING NOTE
Informed patient of being due for a physical exam check up.  Due for Mammogram,Colonoscopy, PAP. Pt will call next week when at work when she knows her schedule.   Patient states that she does not have Asthma.   ---TitaCMA

## 2017-04-19 NOTE — MR AVS SNAPSHOT
After Visit Summary   4/19/2017    Lola Arias    MRN: [id number]           Patient Information     Date Of Birth          1970        Visit Information        Provider Department      4/19/2017 1:40 PM Oleg Patino MD [facility]        Today's Diagnoses     Acute bronchospasm          Care Instructions    ~ Will send a refill of your Albuterol inhaler.   ~Take 2 puffs three times a day for 5 days then you can resume to using this as needed.   ~You can take Robitussin to help with your cough.   Your medication list is printed, please keep this with you, it is helpful to bring this current list to any other medical appointments, the emergency room or hospital.    If you had lab testing today and your results are reassuring or normal they will be be mailed to you within 7 days.     If the lab tests need quick action we will call you with the results.   The phone number we will call with results is # [phone] (home) . If this is not the best number please call our clinic and change the number.    If you need any refills please call your pharmacy and they will contact us.    If you have any further concerns or wish to schedule another appointment you must call our office during normal business hours  [phone] (8-5:00 M-F)  If you have urgent medical questions that cannot wait  you may also call [phone] at any time of day.  If you have a medical emergency please call [phone].    Thank you for coming to [facility].          Follow-ups after your visit        Who to contact     Please call your clinic at [phone] to:    Ask questions about your health    Make or cancel appointments    Discuss your medicines    Learn about your test results    Speak to your doctor   If you have compliments or concerns about an experience at your clinic, or if you wish to file a complaint, please contact [facility] Patient Relations at [phone] or  "email us at [email or web address]         Additional Information About Your Visit        [employer] Information     [employer] is an electronic gateway that provides easy, online access to your medical records. With [employer], you can request a clinic appointment, read your test results, renew a prescription or communicate with your care team.     To sign up for [employer] visit the website at [email or web address]   You will be asked to enter the access code listed below, as well as some personal information. Please follow the directions to create your username and password.     Your access code is: [email or web address]  Expires: 2017  5:01 PM     Your access code will  in 90 days. If you need help or a new code, please contact your [facility] or call [phone] for assistance.        Care EveryWhere ID     This is your Care EveryWhere ID. This could be used by other organizations to access your [place] medical records  [id number]        Your Vitals Were     Pulse Temperature Height Pulse Oximetry BMI (Body Mass Index)       86 98  F (36.7  C) (Oral) 5' 2\" (157.5 cm) 100% 31.09 kg/m2        Blood Pressure from Last 3 Encounters:   17 134/87   17 121/68   17 144/80    Weight from Last 3 Encounters:   17 170 lb (77.1 kg)   17 172 lb (78 kg)   17 182 lb 3.2 oz (82.6 kg)              Today, you had the following     No orders found for display         Where to get your medicines      These medications were sent to [facility] [address] [place] [address]  [address] AT [facility]  [address], [facility] [address]    Hours:  24-hours Phone:  [phone]     albuterol 108 (90 BASE) MCG/ACT Inhaler          Primary Care Provider Office Phone # Fax #    Misbah Palla, [phone]       [facility] [address] [place] [address]        Thank you!     Thank you for choosing " [facility]  for your care. Our goal is always to provide you with excellent care. Hearing back from our patients is one way we can continue to improve our services. Please take a few minutes to complete the written survey that you may receive in the mail after your visit with us. Thank you!             Your Updated Medication List - Protect others around you: Learn how to safely use, store and throw away your medicines at www.disposemymeds.org.          This list is accurate as of: 4/19/17  2:09 PM.  Always use your most recent med list.                   Brand Name Dispense Instructions for use    albuterol 108 (90 BASE) MCG/ACT Inhaler    PROAIR HFA/PROVENTIL HFA/VENTOLIN HFA    1 Inhaler    Inhale 2 puffs into the lungs every 6 hours as needed for shortness of breath / dyspnea or wheezing       aspirin 81 MG chewable tablet     108 tablet    Take 1 tablet (81 mg) by mouth daily       citalopram 20 MG tablet    celeXA    30 tablet    Take 1 tablet (20 mg) by mouth daily       cyclobenzaprine 5 MG tablet    FLEXERIL    30 tablet    Take 1-2 tablets (5-10 mg) by mouth 2 times daily as needed for muscle spasms       ferrous sulfate 325 (65 FE) MG tablet    IRON    60 tablet    Take 1 tablet (325 mg) by mouth 2 times daily       hydrochlorothiazide 12.5 MG Tabs tablet     30 tablet    Take 1 tablet (12.5 mg) by mouth daily       lisinopril 10 MG tablet    PRINIVIL/ZESTRIL    90 tablet    Take 1 tablet (10 mg) by mouth daily       metFORMIN 1000 MG tablet    GLUCOPHAGE    180 tablet    Take 1 tablet (1,000 mg) by mouth 2 times daily (with meals)       nitroglycerin 0.4 MG sublingual tablet    NITROSTAT    25 tablet    Place 1 tablet (0.4 mg) under the tongue every 5 minutes as needed for chest pain If you are still having symptoms after 3 doses (15 minutes) call 911.       omeprazole 20 MG CR capsule    priLOSEC    30 capsule    Take 1 capsule (20 mg) by mouth daily       oxyCODONE-acetaminophen 5-325 MG per  tablet    PERCOCET    18 tablet    Take 1 tablet by mouth every 6 hours as needed for pain (maximum 4 tablet(s) per day)       oxymetazoline 0.05 % spray    AFRIN NASAL SPRAY    1 Bottle    Spray 2-3 sprays into both nostrils 2 times daily as needed for congestion       STATIN NOT PRESCRIBED (INTENTIONAL)     0 each    Statin not prescribed intentionally due to Other LDL wnl, ASCVD <7.5% (This option does not exclude patient from measure)

## 2017-04-26 DIAGNOSIS — K21.9 GASTROESOPHAGEAL REFLUX DISEASE, ESOPHAGITIS PRESENCE NOT SPECIFIED: ICD-10-CM

## 2017-05-01 DIAGNOSIS — E66.09 NON MORBID OBESITY DUE TO EXCESS CALORIES: ICD-10-CM

## 2017-05-01 DIAGNOSIS — K21.9 GASTROESOPHAGEAL REFLUX DISEASE, ESOPHAGITIS PRESENCE NOT SPECIFIED: ICD-10-CM

## 2017-05-01 DIAGNOSIS — I10 BENIGN ESSENTIAL HYPERTENSION: ICD-10-CM

## 2017-05-01 DIAGNOSIS — I10 ESSENTIAL HYPERTENSION WITH GOAL BLOOD PRESSURE LESS THAN 140/90: ICD-10-CM

## 2017-05-01 DIAGNOSIS — R07.89 ATYPICAL CHEST PAIN: ICD-10-CM

## 2017-05-01 DIAGNOSIS — F41.9 ANXIETY: ICD-10-CM

## 2017-05-01 RX ORDER — LISINOPRIL 10 MG/1
10 TABLET ORAL DAILY
Qty: 90 TABLET | Refills: 0 | Status: SHIPPED | OUTPATIENT
Start: 2017-05-01 | End: 2018-03-13

## 2017-05-17 ENCOUNTER — OFFICE VISIT (OUTPATIENT)
Dept: FAMILY MEDICINE | Facility: CLINIC | Age: 47
End: 2017-05-17

## 2017-05-17 VITALS
OXYGEN SATURATION: 97 % | SYSTOLIC BLOOD PRESSURE: 135 MMHG | BODY MASS INDEX: 32.54 KG/M2 | HEIGHT: 62 IN | TEMPERATURE: 97.8 F | DIASTOLIC BLOOD PRESSURE: 84 MMHG | WEIGHT: 176.8 LBS | HEART RATE: 80 BPM

## 2017-05-17 DIAGNOSIS — G89.29 CHRONIC RIGHT-SIDED LOW BACK PAIN WITHOUT SCIATICA: ICD-10-CM

## 2017-05-17 DIAGNOSIS — M54.50 CHRONIC RIGHT-SIDED LOW BACK PAIN WITHOUT SCIATICA: ICD-10-CM

## 2017-05-17 DIAGNOSIS — I10 BENIGN ESSENTIAL HYPERTENSION: ICD-10-CM

## 2017-05-17 DIAGNOSIS — E11.8 TYPE 2 DIABETES MELLITUS WITH COMPLICATION, WITHOUT LONG-TERM CURRENT USE OF INSULIN (H): Primary | ICD-10-CM

## 2017-05-17 DIAGNOSIS — E03.9 HYPOTHYROIDISM, UNSPECIFIED TYPE: ICD-10-CM

## 2017-05-17 DIAGNOSIS — F31.71 BIPOLAR DISORDER, IN PARTIAL REMISSION, MOST RECENT EPISODE HYPOMANIC (H): ICD-10-CM

## 2017-05-17 DIAGNOSIS — F41.9 ANXIETY: ICD-10-CM

## 2017-05-17 DIAGNOSIS — K21.9 GASTROESOPHAGEAL REFLUX DISEASE, ESOPHAGITIS PRESENCE NOT SPECIFIED: ICD-10-CM

## 2017-05-17 DIAGNOSIS — Z00.00 ROUTINE GENERAL MEDICAL EXAMINATION AT A HEALTH CARE FACILITY: ICD-10-CM

## 2017-05-17 PROBLEM — R87.89 OTHER ABNORMAL FINDINGS IN SPECIMENS FROM FEMALE GENITAL ORGANS: Status: ACTIVE | Noted: 2017-05-17

## 2017-05-17 LAB
CHOLEST SERPL-MCNC: 142 MG/DL
CHOLEST/HDLC SERPL: 2.2 RATIO
GLUCOSE P FAST SERPL-MCNC: 124 MG/DL (ref 51–110)
HBA1C MFR BLD: 7.5 % (ref 4.1–5.7)
HDLC SERPL-MCNC: 65 MG/DL
LDLC SERPL CALC-MCNC: 62 MG/DL (ref 0–99)
TRIGL SERPL-MCNC: 77 MG/DL
TSH SERPL DL<=0.05 MIU/L-ACNC: 2.43 UIU/ML (ref 0.3–5)
VLDL-CHOLESTEROL: 15 MG/DL (ref 7–32)

## 2017-05-17 RX ORDER — LAMOTRIGINE 200 MG/1
200 TABLET ORAL DAILY
Qty: 30 TABLET | Refills: 3 | Status: SHIPPED | OUTPATIENT
Start: 2017-06-29 | End: 2021-03-30

## 2017-05-17 RX ORDER — TIZANIDINE 2 MG/1
2-4 TABLET ORAL 3 TIMES DAILY PRN
Qty: 90 TABLET | Refills: 0 | Status: SHIPPED | OUTPATIENT
Start: 2017-05-17 | End: 2021-05-06

## 2017-05-17 RX ORDER — HYDROCHLOROTHIAZIDE 12.5 MG/1
12.5 TABLET ORAL DAILY
Qty: 90 TABLET | Refills: 3 | Status: SHIPPED | OUTPATIENT
Start: 2017-05-17 | End: 2020-08-19

## 2017-05-17 RX ORDER — LAMOTRIGINE 25 MG/1
TABLET ORAL
Qty: 98 TABLET | Refills: 0 | Status: SHIPPED | OUTPATIENT
Start: 2017-05-17 | End: 2017-07-05

## 2017-05-17 NOTE — MR AVS SNAPSHOT
After Visit Summary   2017    Lola Arias    MRN: [id number]           Patient Information     Date Of Birth          1970        Visit Information        Provider Department      2017 8:40 AM Edgardo Gomez MD [facility]        Today's Diagnoses     Type 2 diabetes mellitus with complication, without long-term current use of insulin (H)    -  1    Benign essential hypertension        Bipolar disorder, in partial remission, most recent episode hypomanic (H)        Gastroesophageal reflux disease, esophagitis presence not specified        Anxiety        Chronic right-sided low back pain without sciatica        Hypothyroidism, unspecified type           Follow-ups after your visit        Who to contact     Please call your clinic at [phone] to:    Ask questions about your health    Make or cancel appointments    Discuss your medicines    Learn about your test results    Speak to your doctor   If you have compliments or concerns about an experience at your clinic, or if you wish to file a complaint, please contact [facility] Patient Relations at [phone] or email us at [email or web address]         Additional Information About Your Visit        MyChart Information     [employer] is an electronic gateway that provides easy, online access to your medical records. With [employer], you can request a clinic appointment, read your test results, renew a prescription or communicate with your care team.     To sign up for [employer] visit the website at [email or web address]   You will be asked to enter the access code listed below, as well as some personal information. Please follow the directions to create your username and password.     Your access code is: [email or web address]  Expires: 2017  5:01 PM     Your access code will  in 90 days. If you need help or a new code, please contact your [facility]  "Clinic or call [phone] for assistance.        Care EveryWhere ID     This is your Care EveryWhere ID. This could be used by other organizations to access your [place] medical records  [id number]        Your Vitals Were     Pulse Temperature Height Pulse Oximetry BMI (Body Mass Index)       80 97.8  F (36.6  C) (Oral) 5' 2\" (157.5 cm) 97% 32.34 kg/m2        Blood Pressure from Last 3 Encounters:   05/17/17 135/84   04/19/17 134/87   03/09/17 121/68    Weight from Last 3 Encounters:   05/17/17 176 lb 12.8 oz (80.2 kg)   04/19/17 170 lb (77.1 kg)   03/09/17 172 lb (78 kg)              We Performed the Following     Gucose Fasting ([facility])     Hemoglobin A1c ([facility])     Lipid Panel ([facility]) - Results < 1 hr     TSH  Sensitive ([facility])          Today's Medication Changes          These changes are accurate as of: 5/17/17  9:31 AM.  If you have any questions, ask your nurse or doctor.               Start taking these medicines.        Dose/Directions    * lamoTRIgine 25 MG tablet   Commonly known as:  LaMICtal   Used for:  Bipolar disorder, in partial remission, most recent episode hypomanic (H)   Started by:  Edgardo Gomez MD        Take 1 pill daily for two weeks. Then on weeks 3 and 4, take two pills (50 mg) daily. Then on weeks 5-6, take 4 pills (100 mg).   Quantity:  98 tablet   Refills:  0       * lamoTRIgine 200 MG tablet   Commonly known as:  LaMICtal   Used for:  Bipolar disorder, in partial remission, most recent episode hypomanic (H)   Started by:  Edgardo Gomez MD        Dose:  200 mg   Start taking on:  6/29/2017   Take 1 tablet (200 mg) by mouth daily   Quantity:  30 tablet   Refills:  3       tiZANidine 2 MG tablet   Commonly known as:  ZANAFLEX   Used for:  Chronic right-sided low back pain without sciatica   Started by:  Edgardo Gomez MD        Dose:  2-4 mg   Take 1-2 tablets (2-4 mg) by mouth 3 times daily as needed for muscle spasms   Quantity:  90 tablet "   Refills:  0       * Notice:  This list has 2 medication(s) that are the same as other medications prescribed for you. Read the directions carefully, and ask your doctor or other care provider to review them with you.         Where to get your medicines      These medications were sent to [facility] [address] [place] [address] - [address] AT [facility]  [address], [facility] [address]    Hours:  24-hours Phone:  [phone]     hydrochlorothiazide 12.5 MG Tabs tablet    lamoTRIgine 200 MG tablet    lamoTRIgine 25 MG tablet    omeprazole 20 MG CR capsule    tiZANidine 2 MG tablet                Primary Care Provider Office Phone # Fax #    Misbah Palla, [phone]       [facility] [address] [place] [address]        Thank you!     Thank you for choosing [facility]  for your care. Our goal is always to provide you with excellent care. Hearing back from our patients is one way we can continue to improve our services. Please take a few minutes to complete the written survey that you may receive in the mail after your visit with us. Thank you!             Your Updated Medication List - Protect others around you: Learn how to safely use, store and throw away your medicines at www.disposemymeds.org.          This list is accurate as of: 5/17/17  9:31 AM.  Always use your most recent med list.                   Brand Name Dispense Instructions for use    albuterol 108 (90 BASE) MCG/ACT Inhaler    PROAIR HFA/PROVENTIL HFA/VENTOLIN HFA    1 Inhaler    Inhale 2 puffs into the lungs every 6 hours as needed for shortness of breath / dyspnea or wheezing       aspirin 81 MG chewable tablet     108 tablet    Take 1 tablet (81 mg) by mouth daily       citalopram 20 MG tablet    celeXA    30 tablet    Take 1 tablet (20 mg) by mouth daily       cyclobenzaprine 5 MG tablet    FLEXERIL    30 tablet    Take 1-2 tablets (5-10 mg) by mouth 2 times daily  as needed for muscle spasms       hydrochlorothiazide 12.5 MG Tabs tablet     90 tablet    Take 1 tablet (12.5 mg) by mouth daily       * lamoTRIgine 25 MG tablet    LaMICtal    98 tablet    Take 1 pill daily for two weeks. Then on weeks 3 and 4, take two pills (50 mg) daily. Then on weeks 5-6, take 4 pills (100 mg).       * lamoTRIgine 200 MG tablet   Start taking on:  6/29/2017    LaMICtal    30 tablet    Take 1 tablet (200 mg) by mouth daily       lisinopril 10 MG tablet    PRINIVIL/ZESTRIL    90 tablet    Take 1 tablet (10 mg) by mouth daily       metFORMIN 1000 MG tablet    GLUCOPHAGE    180 tablet    Take 1 tablet (1,000 mg) by mouth 2 times daily (with meals)       nitroglycerin 0.4 MG sublingual tablet    NITROSTAT    25 tablet    Place 1 tablet (0.4 mg) under the tongue every 5 minutes as needed for chest pain If you are still having symptoms after 3 doses (15 minutes) call 911.       omeprazole 20 MG CR capsule    priLOSEC    30 capsule    Take 1 capsule (20 mg) by mouth daily       STATIN NOT PRESCRIBED (INTENTIONAL)     0 each    Statin not prescribed intentionally due to Other LDL wnl, ASCVD <7.5% (This option does not exclude patient from measure)       tiZANidine 2 MG tablet    ZANAFLEX    90 tablet    Take 1-2 tablets (2-4 mg) by mouth 3 times daily as needed for muscle spasms       * Notice:  This list has 2 medication(s) that are the same as other medications prescribed for you. Read the directions carefully, and ask your doctor or other care provider to review them with you.

## 2017-05-17 NOTE — PROGRESS NOTES
Female Physical Note    Concerns today: No special concerns today. She has work forms requesting labs and BP check.     ROS:  CONSTITUTIONAL: no fatigue, no unexpected change in weight  SKIN: no worrisome rashes, no worrisome moles, no worrisome lesions  EYES: no acute vision problems or changes  ENT: no ear problems, no mouth problems, no throat problems  RESP: no significant cough, no shortness of breath  BREAST: no masses, no tenderness, no discharge  CV: no chest pain, no palpitations, no new or worsening peripheral edema  GI: no nausea, no vomiting, no constipation, no diarrhea  : no frequency, no dysuria, no hematuria  MS: no claudication, no myalgias, no joint aches  NEURO: no weakness, no dizziness, no syncope, no headaches  ENDOCRINE: no temperature intolerance, no skin/hair changes  HEME: no easy bruising, no bleeding problems  PSYCHIATRIC: Mild hypomania and mood swings. No significant depression. No SI. No manic episodes.     Sexually Active: Yes  Sexual concerns: No   Contraception: tubal ligation  Menses: hx of heavy periods. Had Essure Procedure with Dr. Shah in Jan 2017. No periods since then.    No LMP recorded.   STD History: Neg  Last Pap Smear Date: January with Dr. Pablo Knott. Reportedly normal  Abnormal Pap History: Irregular paps in the past. Colposcopy in 2011. Normal here in 2013.    Patient Active Problem List   Diagnosis     Chronic back pain     Alcohol dependence in remission (H)     Diabetes mellitus, type 2 (H)     Bipolar disorder (H)     Depression with anxiety     H/O tubal ligation     Hypothyroidism     GERD (gastroesophageal reflux disease)     Tobacco dependence syndrome     Irritable colon     Displacement of lumbar intervertebral disc     Peptic ulcer     Depressed mood     Obesity     Essential hypertension     Abnormal cytological findings in female genital organs       Current Outpatient Prescriptions   Medication Sig Dispense Refill     hydrochlorothiazide 12.5  MG TABS tablet Take 1 tablet (12.5 mg) by mouth daily 90 tablet 3     omeprazole (PRILOSEC) 20 MG CR capsule Take 1 capsule (20 mg) by mouth daily 30 capsule 11     tiZANidine (ZANAFLEX) 2 MG tablet Take 1-2 tablets (2-4 mg) by mouth 3 times daily as needed for muscle spasms 90 tablet 0     lamoTRIgine (LAMICTAL) 25 MG tablet Take 1 pill daily for two weeks. Then on weeks 3 and 4, take two pills (50 mg) daily. Then on weeks 5-6, take 4 pills (100 mg). 98 tablet 0     [START ON 6/29/2017] lamoTRIgine (LAMICTAL) 200 MG tablet Take 1 tablet (200 mg) by mouth daily 30 tablet 3     lisinopril (PRINIVIL/ZESTRIL) 10 MG tablet Take 1 tablet (10 mg) by mouth daily 90 tablet 0     albuterol (PROAIR HFA/PROVENTIL HFA/VENTOLIN HFA) 108 (90 BASE) MCG/ACT Inhaler Inhale 2 puffs into the lungs every 6 hours as needed for shortness of breath / dyspnea or wheezing 1 Inhaler 1     metFORMIN (GLUCOPHAGE) 1000 MG tablet Take 1 tablet (1,000 mg) by mouth 2 times daily (with meals) 180 tablet 1     cyclobenzaprine (FLEXERIL) 5 MG tablet Take 1-2 tablets (5-10 mg) by mouth 2 times daily as needed for muscle spasms 30 tablet 0     aspirin 81 MG chewable tablet Take 1 tablet (81 mg) by mouth daily 108 tablet 0     STATIN NOT PRESCRIBED, INTENTIONAL, Statin not prescribed intentionally due to Other LDL wnl, ASCVD <7.5% (This option does not exclude patient from measure) 0 each 0     citalopram (CELEXA) 20 MG tablet Take 1 tablet (20 mg) by mouth daily 30 tablet 3     nitroglycerin (NITROSTAT) 0.4 MG SL tablet Place 1 tablet (0.4 mg) under the tongue every 5 minutes as needed for chest pain If you are still having symptoms after 3 doses (15 minutes) call 911. 25 tablet 3       Past Medical History:   Diagnosis Date     Alcohol dependence in remission (H) 11/8/2013     Bipolar disorder, unspecified (H) 11/15/2013     Chronic back pain 11/8/2013     Diabetes mellitus (H)      Kidney stones         Family History     Problem (# of Occurrences)  "Relation (Name,Age of Onset)    Breast Cancer (1) Maternal Grandmother    Coronary Artery Disease (1) Father    DIABETES (1) Other: Paternal Uncle    Hypertension (3) Father, Maternal Grandmother, Other: Paternal Uncle    Other Cancer (1) Father: Oral       Negative family history of: Asthma          Problem List Medication List and Allergy List were reviewed.    Patient is an established patient of this clinic..    Social History   Substance Use Topics     Smoking status: Current Every Day Smoker     Packs/day: 0.50     Types: Cigarettes     Smokeless tobacco: Never Used      Comment: Declined CIQ, not ready to quit. 3-11-16     Alcohol use No     Cohabiting   Children ? Yes; teenage daughter, Philomena    Has anyone hurt you physically, for example by pushing, hitting, slapping or kicking you or forcing you to have sex? Denies  Do you feel threatened or controlled by a partner, ex-partner or anyone in your life? Denies    RISK BEHAVIORS AND HEALTHY HABITS:  Tobacco Use/Smoking: Smoking half pack per day  Illicit Drug Use: None  Do you use alcohol? No  Diet (5-7 servings of fruits/veg daily): Yes   Exercise (30 min accumulated most days):Yes  Dental Care: Yes   Calcium 1500 mg/d:  No  Seat Belt Use: Yes     Immunization History   Administered Date(s) Administered     Influenza (IIV3) 10/25/2014, 11/23/2015     Tdap (Adacel,Boostrix) 06/24/2011    Reviewed Immunization Record Today    EXAMINATION:   /84  Pulse 80  Temp 97.8  F (36.6  C) (Oral)  Ht 5' 2\" (157.5 cm)  Wt 176 lb 12.8 oz (80.2 kg)  SpO2 97%  BMI 32.34 kg/m2  GENERAL: healthy, alert and no distress  EYES: Eyes grossly normal to inspection, extraocular movements - intact, and PERRL  HENT: ear canals- normal; TMs- normal; Nose- normal; Mouth- no ulcers, no lesions  NECK: no tenderness, no adenopathy, no asymmetry, no masses, no stiffness; thyroid- normal to palpation  RESP: lungs clear to auscultation - no rales, no rhonchi, no wheezes  BREAST: no " masses, no tenderness, no nipple discharge, no palpable axillary masses or adenopathy  CV: regular rates and rhythm, normal S1 S2, no S3 or S4 and no murmur, no click or rub -  ABDOMEN: soft, no tenderness, no  hepatosplenomegaly, no masses, normal bowel sounds  MS: extremities- no gross deformities noted, no edema  SKIN: no suspicious lesions, no rashes  NEURO: strength and tone- normal, sensory exam- grossly normal, mentation- intact, speech- normal, reflexes- symmetric  BACK: no CVA tenderness, no paralumbar tenderness  PSYCH: Alert and oriented times 3; speech- coherent , normal rate and volume; able to articulate logical thoughts, able to abstract reason, no tangential thoughts, no hallucinations or delusions, affect- normal  LYMPHATICS: ant. cervical- normal, post. cervical- normal, axillary- normal, supraclavicular- normal, inguinal- normal    ASSESSMENT/PLAN:  Lola Arias is a 46 year old female here for routine exam. She has T2DM, HTN, GERD, Bipolar DO, intermittent low back pain with muscle spasms, and questionable hypothyroidism.    Health Care Maintenance: Normal Physical Exam. Forms filled out for work. She had pap smear with [facility].     1. Type 2 diabetes mellitus with complication, without long-term current use of insulin (H): She is exercising more and trying to lose weight. A1C 7.5 today. Continue current regimen of Metformin. Lipids checked and normal. She declines statin and this is reasonable given her excellent lipid profile.   - Lipid Panel ([facility]) - Results < 1 hr  - Hemoglobin A1c ([facility])  - Gucose Fasting ([facility])    2. Benign essential hypertension: BP well controlled on ACE- and thiazide. Continue current regimen. She is also on ASA 81 mg.   - hydrochlorothiazide 12.5 MG TABS tablet; Take 1 tablet (12.5 mg) by mouth daily  Dispense: 90 tablet; Refill: 3    3. Bipolar disorder, in partial remission, most recent episode hypomanic (H): Patient with stable bipolar disorder. She has  been on anti-psychotics and SSRI's in the past. She had weight gain from these. She also may have been on Lamictal in the past. Will try to restart as below. Discussed side effects and to stop if she develops a rash.   - lamoTRIgine (LAMICTAL) 25 MG tablet; Take 1 pill daily for two weeks. Then on weeks 3 and 4, take two pills (50 mg) daily. Then on weeks 5-6, take 4 pills (100 mg).  Dispense: 98 tablet; Refill: 0  - lamoTRIgine (LAMICTAL) 200 MG tablet; Take 1 tablet (200 mg) by mouth daily  Dispense: 30 tablet; Refill: 3    4. Gastroesophageal reflux disease, esophagitis presence not specified: Symptoms well controlled with PPI. She reports being unable to wean and will continue for now.   - omeprazole (PRILOSEC) 20 MG CR capsule; Take 1 capsule (20 mg) by mouth daily  Dispense: 30 capsule; Refill: 11    5. Chronic right-sided low back pain without sciatica: Normal exam today. She reports intermittent pain and severe spasms. She has Flexeril at home - 30 pills has lasted her 5 months. She requests something better and will try Zanaflex as below.   - tiZANidine (ZANAFLEX) 2 MG tablet; Take 1-2 tablets (2-4 mg) by mouth 3 times daily as needed for muscle spasms  Dispense: 90 tablet; Refill: 0    6. Hypothyroidism, unspecified type: Unclear history. She does not have any symptoms of thyroid dysfunction. Plan to check TSH and will add Synthroid if needed.   - TSH  Sensitive ([facility])    Will call with results. F/U in about 1-2 months to reassess new Lamictal medication    Nicanor Gomez MD  [facility] Medicine Resident    Precepted with Dr. Egan

## 2017-05-19 PROBLEM — R87.89 OTHER ABNORMAL FINDINGS IN SPECIMENS FROM FEMALE GENITAL ORGANS: Status: RESOLVED | Noted: 2017-05-17 | Resolved: 2017-05-19

## 2017-05-22 NOTE — PROGRESS NOTES
Preceptor Attestation:  Patient's case reviewed and discussed with Edgardo Gomez MD resident and I evaluated the patient. I agree with written assessment and plan of care.    Supervising Physician:  Chyna Egan   [facility]

## 2017-06-05 DIAGNOSIS — I10 ESSENTIAL HYPERTENSION: ICD-10-CM

## 2017-06-05 NOTE — TELEPHONE ENCOUNTER
Calling to request refill on medication below, told her that we did received it from the pharmacy and could take up two business days for a response. Please call and advise.

## 2017-06-07 RX ORDER — ASPIRIN 81 MG/1
81 TABLET, CHEWABLE ORAL DAILY
Qty: 108 TABLET | Refills: 0 | Status: SHIPPED | OUTPATIENT
Start: 2017-06-07 | End: 2017-09-25

## 2017-07-01 ENCOUNTER — HEALTH MAINTENANCE LETTER (OUTPATIENT)
Age: 47
End: 2017-07-01

## 2017-07-05 DIAGNOSIS — F31.71 BIPOLAR DISORDER, IN PARTIAL REMISSION, MOST RECENT EPISODE HYPOMANIC (H): ICD-10-CM

## 2017-07-06 RX ORDER — LAMOTRIGINE 25 MG/1
TABLET ORAL
Qty: 98 TABLET | Refills: 3 | Status: SHIPPED | OUTPATIENT
Start: 2017-07-06 | End: 2017-08-21

## 2017-08-21 DIAGNOSIS — F31.71 BIPOLAR DISORDER, IN PARTIAL REMISSION, MOST RECENT EPISODE HYPOMANIC (H): ICD-10-CM

## 2017-08-21 RX ORDER — LAMOTRIGINE 25 MG/1
TABLET ORAL
Qty: 98 TABLET | Refills: 3 | Status: SHIPPED | OUTPATIENT
Start: 2017-08-21 | End: 2021-03-30

## 2017-09-25 DIAGNOSIS — I10 ESSENTIAL HYPERTENSION: ICD-10-CM

## 2017-09-25 RX ORDER — ASPIRIN 81 MG/1
81 TABLET, CHEWABLE ORAL DAILY
Qty: 108 TABLET | Refills: 3 | Status: SHIPPED | OUTPATIENT
Start: 2017-09-25 | End: 2023-04-04

## 2018-01-24 ENCOUNTER — TELEPHONE (OUTPATIENT)
Dept: FAMILY MEDICINE | Facility: CLINIC | Age: 48
End: 2018-01-24

## 2018-01-24 NOTE — TELEPHONE ENCOUNTER
Called, spoke with patient today. Patient states she will not know her work schedule until tomorrow. Patient is aware she is due for a f/u and will call back tomorrow to schedule an appointment once she finds out work schedule. Nothing further needed.  ARGELIA Albert

## 2018-02-06 DIAGNOSIS — E11.9 TYPE 2 DIABETES MELLITUS WITHOUT COMPLICATION, UNSPECIFIED LONG TERM INSULIN USE STATUS: ICD-10-CM

## 2018-02-07 ENCOUNTER — OFFICE VISIT (OUTPATIENT)
Dept: FAMILY MEDICINE | Facility: CLINIC | Age: 48
End: 2018-02-07
Payer: MEDICAID

## 2018-02-07 VITALS
TEMPERATURE: 97.6 F | HEIGHT: 63 IN | DIASTOLIC BLOOD PRESSURE: 74 MMHG | WEIGHT: 184.2 LBS | SYSTOLIC BLOOD PRESSURE: 126 MMHG | OXYGEN SATURATION: 96 % | HEART RATE: 88 BPM | RESPIRATION RATE: 20 BRPM | BODY MASS INDEX: 32.64 KG/M2

## 2018-02-07 DIAGNOSIS — E11.8 TYPE 2 DIABETES MELLITUS WITH COMPLICATION, UNSPECIFIED LONG TERM INSULIN USE STATUS: Primary | ICD-10-CM

## 2018-02-07 DIAGNOSIS — J98.01 ACUTE BRONCHOSPASM: ICD-10-CM

## 2018-02-07 DIAGNOSIS — F41.9 ANXIETY: ICD-10-CM

## 2018-02-07 DIAGNOSIS — I10 BENIGN ESSENTIAL HYPERTENSION: ICD-10-CM

## 2018-02-07 DIAGNOSIS — K21.9 GASTROESOPHAGEAL REFLUX DISEASE, ESOPHAGITIS PRESENCE NOT SPECIFIED: ICD-10-CM

## 2018-02-07 DIAGNOSIS — E11.9 TYPE 2 DIABETES MELLITUS WITHOUT COMPLICATION, UNSPECIFIED LONG TERM INSULIN USE STATUS: ICD-10-CM

## 2018-02-07 DIAGNOSIS — R07.89 ATYPICAL CHEST PAIN: ICD-10-CM

## 2018-02-07 DIAGNOSIS — I10 ESSENTIAL HYPERTENSION WITH GOAL BLOOD PRESSURE LESS THAN 140/90: ICD-10-CM

## 2018-02-07 DIAGNOSIS — E66.09 NON MORBID OBESITY DUE TO EXCESS CALORIES: ICD-10-CM

## 2018-02-07 LAB
BUN SERPL-MCNC: 4 MG/DL (ref 5–24)
CALCIUM SERPL-MCNC: 9.6 MG/DL (ref 8.5–10.4)
CHLORIDE SERPLBLD-SCNC: 101 MMOL/L (ref 94–109)
CHOLEST SERPL-MCNC: 158 MG/DL
CO2 SERPL-SCNC: 27 MMOL/L (ref 20–32)
CREAT SERPL-MCNC: 0.5 MG/DL (ref 0.6–1.3)
CREAT UR-MCNC: 61.3 MG/DL
EGFR CALCULATED (BLACK REFERENCE): >90 ML/MIN
EGFR CALCULATED (NON BLACK REFERENCE): >90 ML/MIN
FASTING?: NO
GLUCOSE SERPL-MCNC: 139 MG/DL (ref 60–109)
HBA1C MFR BLD: 7.3 % (ref 4.1–5.7)
HDLC SERPL-MCNC: 60 MG/DL
LDLC SERPL CALC-MCNC: 67 MG/DL
MICROALBUMIN UR-MCNC: 1.2 MG/DL (ref 0–1.99)
MICROALBUMIN/CREAT UR: 19.6 MG/G
POTASSIUM SERPL-SCNC: 3.5 MMOL/L (ref 3.4–5.3)
SODIUM SERPL-SCNC: 138 MMOL/L (ref 133–144)
TRIGL SERPL-MCNC: 157 MG/DL

## 2018-02-07 RX ORDER — CITALOPRAM HYDROBROMIDE 20 MG/1
20 TABLET ORAL DAILY
Qty: 30 TABLET | Refills: 3 | Status: SHIPPED | OUTPATIENT
Start: 2018-02-07 | End: 2021-03-30

## 2018-02-07 RX ORDER — ALBUTEROL SULFATE 90 UG/1
2 AEROSOL, METERED RESPIRATORY (INHALATION) EVERY 6 HOURS PRN
Qty: 1 INHALER | Refills: 1 | Status: SHIPPED | OUTPATIENT
Start: 2018-02-07 | End: 2020-08-19

## 2018-02-07 NOTE — MR AVS SNAPSHOT
After Visit Summary   2/7/2018    Lola Arias    MRN: [id number]           Patient Information     Date Of Birth          1970        Visit Information        Provider Department      2/7/2018 1:20 PM Palla, Misbah Yousuf, MD [facility]        Today's Diagnoses     Type 2 diabetes mellitus with complication, unspecified long term insulin use status (H)    -  1      Care Instructions    Your health will be better with weight loss. To lose weight, you will need to increase your walking slowly (a minute or 2 a day) until you are walking 60 minutes every day. To reduce your total calories you will need to stop eating rice, potatos, breads, cookies, cakes, and sweets. You can get your carbohydrates from fruits and vegetables. Your meal portions will have to be small.        Diet: Diabetes  Food is an important tool that you can use to control diabetes and stay healthy. Eating well-balanced meals in the correct amounts will help you control your blood glucose levels and prevent low blood sugar reactions. It will also help you reduce the health risks of diabetes. There is no one specific diet that is right for everyone with diabetes. But there are general guidelines to follow. A registered dietitian (RD) will create a tailored diet approach that s just right for you. He or she will also help you plan healthy meals and snacks. If you have any questions, call your dietitian for advice.     Guidelines for success  Talk with your healthcare provider before starting a diabetes diet or weight loss program. If you haven't talked with a dietitian yet, ask your provider for a referral. The following guidelines can help you succeed:    Select foods from the 6 food groups below. Your dietitian will help you find food choices within each group. He or she will also show you serving sizes and how many servings you can have at each meal.    Grains, beans, and starchy  vegetables    Vegetables    Fruit    Milk or yogurt    Meat, poultry, fish, or tofu    Healthy fats    Check your blood sugar levels as directed by your provider. Take any medicine as prescribed by your provider.    Learn to read food labels and pick the right portion sizes.    Eat only the amount of food in your meal plan. Eat about the same amount of food at regular times each day. Don t skip meals. Eat meals 4 to 5 hours apart, with snacks in between.    Limit alcohol. It raises blood sugar levels. Drink water or calorie-free diet drinks that use safe sweeteners.    Eat less fat to help lower your risk of heart disease. Use nonfat or low-fat dairy products and lean meats. Avoid fried foods. Use cooking oils that are unsaturated, such as olive, canola, or peanut oil.    Talk with your dietitian about safe sugar substitutes.    Avoid added salt. It can contribute to high blood pressure, which can cause heart disease. People with diabetes already have a risk of high blood pressure and heart disease.    Stay at a healthy weight. If you need to lose weight, cut down on your portion sizes. But don t skip meals. Exercise is an important part of any weight management program. Talk with your provider about an exercise program that s right for you.    For more information about the best diet plan for you, talk with a registered dietitian (RD). To find an RD in your area, contact:    Academy of Nutrition and Dietetics www.eatright.org    The American Diabetes Association 090-811-6447 www.diabetes.org  Date Last Reviewed: 8/1/2016 2000-2017 [employer]. [address] [place] [address], [place] [address]. All rights reserved. This information is not intended as a substitute for professional medical care. Always follow your healthcare professional's instructions.                Follow-ups after your visit        Who to contact     Please call your clinic at [phone] to:    Ask questions about your health    Make or  "cancel appointments    Discuss your medicines    Learn about your test results    Speak to your doctor   If you have compliments or concerns about an experience at your clinic, or if you wish to file a complaint, please contact [facility] Patient Relations at [phone] or email us at [email or web address]         Additional Information About Your Visit        [employer] Information     [employer] is an electronic gateway that provides easy, online access to your medical records. With [employer], you can request a clinic appointment, read your test results, renew a prescription or communicate with your care team.     To sign up for [employer] visit the website at [email or web address]   You will be asked to enter the access code listed below, as well as some personal information. Please follow the directions to create your username and password.     Your access code is: [email or web address]  Expires: 2018  1:24 PM     Your access code will  in 90 days. If you need help or a new code, please contact your [facility] or call [phone] for assistance.        Care EveryWhere ID     This is your Care EveryWhere ID. This could be used by other organizations to access your [facility] medical records  [id number]        Your Vitals Were     Pulse Temperature Respirations Height Pulse Oximetry BMI (Body Mass Index)    88 97.6  F (36.4  C) (Oral) 20 5' 2.5\" (158.8 cm) 96% 33.15 kg/m2       Blood Pressure from Last 3 Encounters:   18 126/74   17 135/84   17 134/87    Weight from Last 3 Encounters:   18 184 lb 3.2 oz (83.6 kg)   17 176 lb 12.8 oz (80.2 kg)   17 170 lb (77.1 kg)              We Performed the Following     Basic Metabolic Panel ([facility]) - Results < 1 hr     Hemoglobin A1c ([facility])     Lipid Bynum ([facility]) - Results > 1 hr     Microalbumin Creatinine Ratio Random Ur ([facility])        Primary Care Provider " Office Phone # Fax #    Raúl Yousuf Palla, [phone]       [place] [address]        Equal Access to Services     DIALLO OLIVA : Hadii aad ku hadrhetto Sojesseali, waaxda luqadaha, qaybta kaalmada adedangeloyada, lubna edwards laBipinlogan dominguez. So [facility] [phone].    ATENCIÓN: Si habla español, tiene a scott disposición servicios gratuitos de asistencia lingüística. Llame al [phone].    We comply with applicable federal civil rights laws and [place] laws. We do not discriminate on the basis of race, color, national origin, age, disability, sex, sexual orientation, or gender identity.            Thank you!     Thank you for choosing [facility]  for your care. Our goal is always to provide you with excellent care. Hearing back from our patients is one way we can continue to improve our services. Please take a few minutes to complete the written survey that you may receive in the mail after your visit with us. Thank you!             Your Updated Medication List - Protect others around you: Learn how to safely use, store and throw away your medicines at www.disposemymeds.org.          This list is accurate as of 2/7/18  2:07 PM.  Always use your most recent med list.                   Brand Name Dispense Instructions for use Diagnosis    albuterol 108 (90 BASE) MCG/ACT Inhaler    PROAIR HFA/PROVENTIL HFA/VENTOLIN HFA    1 Inhaler    Inhale 2 puffs into the lungs every 6 hours as needed for shortness of breath / dyspnea or wheezing    Acute bronchospasm       aspirin 81 MG chewable tablet     108 tablet    Take 1 tablet (81 mg) by mouth daily    Essential hypertension       citalopram 20 MG tablet    celeXA    30 tablet    Take 1 tablet (20 mg) by mouth daily    Anxiety, Atypical chest pain, Gastroesophageal reflux disease, esophagitis presence not specified, Benign essential hypertension, Non morbid obesity due to excess calories,  Type 2 diabetes mellitus with complication (H), Essential hypertension with goal blood pressure less than 140/90       cyclobenzaprine 5 MG tablet    FLEXERIL    30 tablet    Take 1-2 tablets (5-10 mg) by mouth 2 times daily as needed for muscle spasms    Chronic bilateral low back pain without sciatica       hydrochlorothiazide 12.5 MG Tabs tablet     90 tablet    Take 1 tablet (12.5 mg) by mouth daily    Benign essential hypertension       * lamoTRIgine 200 MG tablet    LaMICtal    30 tablet    Take 1 tablet (200 mg) by mouth daily    Bipolar disorder, in partial remission, most recent episode hypomanic (H)       * lamoTRIgine 25 MG tablet    LaMICtal    98 tablet    Take 1 pill daily for two weeks. Then on weeks 3 and 4, take two pills (50 mg) daily. Then on weeks 5-6, take 4 pills (100 mg).    Bipolar disorder, in partial remission, most recent episode hypomanic (H)       lisinopril 10 MG tablet    PRINIVIL/ZESTRIL    90 tablet    Take 1 tablet (10 mg) by mouth daily    Benign essential hypertension, Atypical chest pain, Anxiety, Gastroesophageal reflux disease, esophagitis presence not specified, Non morbid obesity due to excess calories, Essential hypertension with goal blood pressure less than 140/90       metFORMIN 1000 MG tablet    GLUCOPHAGE    180 tablet    Take 1 tablet (1,000 mg) by mouth 2 times daily (with meals)    Type 2 diabetes mellitus without complication, unspecified long term insulin use status (H)       nitroGLYcerin 0.4 MG sublingual tablet    NITROSTAT    25 tablet    Place 1 tablet (0.4 mg) under the tongue every 5 minutes as needed for chest pain If you are still having symptoms after 3 doses (15 minutes) call 911.    Chest pain       omeprazole 20 MG CR capsule    priLOSEC    30 capsule    Take 1 capsule (20 mg) by mouth daily    Gastroesophageal reflux disease, esophagitis presence not specified       STATIN NOT PRESCRIBED (INTENTIONAL)     0 each    Statin not prescribed intentionally  due to Other LDL wnl, ASCVD <7.5% (This option does not exclude patient from measure)    Type 2 diabetes mellitus without complication, without long-term current use of insulin (H)       tiZANidine 2 MG tablet    ZANAFLEX    90 tablet    Take 1-2 tablets (2-4 mg) by mouth 3 times daily as needed for muscle spasms    Chronic right-sided low back pain without sciatica       * Notice:  This list has 2 medication(s) that are the same as other medications prescribed for you. Read the directions carefully, and ask your doctor or other care provider to review them with you.

## 2018-02-07 NOTE — PROGRESS NOTES
"HPI    Lola Arias is 47 year old yo female presenting for:    1. DM II check  - currently takes metformin 1000 mg BID  - checks sugars at home periodically    - 1-2x/week    - does not remember her glucose values when she measures them  - diet:   - \"variety of food\"   - fruits, vegetables   - does eat fast food twice a week  - exercise:   - \"shoveling the drive way is enough exercise\"   - bowling once a week  - a1c in 5/2017 was 7.5    OBJECTIVE    Vitals  /74  Pulse 88  Temp 97.6  F (36.4  C) (Oral)  Resp 20  Ht 5' 2.5\" (158.8 cm)  Wt 184 lb 3.2 oz (83.6 kg)  SpO2 96%  BMI 33.15 kg/m2      Physical Exam  General: No acute distress  Eyes: EOMI, PERRLA  CV: RRR, distal/peripheral pulses in tact   Respiratory: CTA bilaterally, no wheezes/rhonchi/rhales appreciated, no respiratory distress  Extremities: no cyanosis, no edema, capillary refill <2 seconds, well perfused   - LE exam: sensation to light touch, vibration, pain is in tact, proprioception in tact  Neuro: no focal deficits noted, reflexes within normal limits    ASSESSMENT/PLAN  #DM II check  - last a1c:   Lab Results   Component Value Date    A1C 7.5 05/17/2017    A1C 8.0 01/03/2017    A1C 8.4 07/15/2016    A1C 7.6 01/15/2016    A1C 7.4 10/10/2014     - current a1c: 7.3  - diabetic meds prior to this visit:   - metformin 1000 mg BID  - diabetic meds after this visit:    - continue metformin 1000 mg BID   - patient would like to work on diet and exercise more before starting new meds   - advised that if a1c stays the same or increases in 3 months, we would likely want to start another medication, patient agreeable to this plan  - diabetic eye exam: last one was in June 2017 per patient    - will obtain records, no referral today  - diabetic foot exam:   - completed in clinic today, wnl  - nutrition: hand out given to patient and discussed  - exercise and weight loss discussed   - follow up in: 3 months  - statin not prescribed 2/2 to " ASCVD <7.5 %  - continue asa 81 mg daily    # Hypertension  - BMP pending  - normotensize  - continue current regimen   - lisinopril 10 mg   - hctz 12.5 mg    #CATHIE  - patient has celexa on her med list but has not been taking  - will resume her celexa 20 mg daily      Precepted with Dr. Zavala

## 2018-02-07 NOTE — PATIENT INSTRUCTIONS
Your health will be better with weight loss. To lose weight, you will need to increase your walking slowly (a minute or 2 a day) until you are walking 60 minutes every day. To reduce your total calories you will need to stop eating rice, potatos, breads, cookies, cakes, and sweets. You can get your carbohydrates from fruits and vegetables. Your meal portions will have to be small.        Diet: Diabetes  Food is an important tool that you can use to control diabetes and stay healthy. Eating well-balanced meals in the correct amounts will help you control your blood glucose levels and prevent low blood sugar reactions. It will also help you reduce the health risks of diabetes. There is no one specific diet that is right for everyone with diabetes. But there are general guidelines to follow. A registered dietitian (RD) will create a tailored diet approach that s just right for you. He or she will also help you plan healthy meals and snacks. If you have any questions, call your dietitian for advice.     Guidelines for success  Talk with your healthcare provider before starting a diabetes diet or weight loss program. If you haven't talked with a dietitian yet, ask your provider for a referral. The following guidelines can help you succeed:    Select foods from the 6 food groups below. Your dietitian will help you find food choices within each group. He or she will also show you serving sizes and how many servings you can have at each meal.    Grains, beans, and starchy vegetables    Vegetables    Fruit    Milk or yogurt    Meat, poultry, fish, or tofu    Healthy fats    Check your blood sugar levels as directed by your provider. Take any medicine as prescribed by your provider.    Learn to read food labels and pick the right portion sizes.    Eat only the amount of food in your meal plan. Eat about the same amount of food at regular times each day. Don t skip meals. Eat meals 4 to 5 hours apart, with snacks in  between.    Limit alcohol. It raises blood sugar levels. Drink water or calorie-free diet drinks that use safe sweeteners.    Eat less fat to help lower your risk of heart disease. Use nonfat or low-fat dairy products and lean meats. Avoid fried foods. Use cooking oils that are unsaturated, such as olive, canola, or peanut oil.    Talk with your dietitian about safe sugar substitutes.    Avoid added salt. It can contribute to high blood pressure, which can cause heart disease. People with diabetes already have a risk of high blood pressure and heart disease.    Stay at a healthy weight. If you need to lose weight, cut down on your portion sizes. But don t skip meals. Exercise is an important part of any weight management program. Talk with your provider about an exercise program that s right for you.    For more information about the best diet plan for you, talk with a registered dietitian (RD). To find an RD in your area, contact:    Academy of Nutrition and Dietetics www.eatright.org    The American Diabetes Association [phone] www.diabetes.org  Date Last Reviewed: 8/1/2016 2000-2017 [employer]. [address] [place] [address], [place] [address]. All rights reserved. This information is not intended as a substitute for professional medical care. Always follow your healthcare professional's instructions.

## 2018-02-07 NOTE — PROGRESS NOTES
Preceptor Attestation:  Patient's case reviewed and discussed with Misbah Y. Palla, MD Patient seen and discussed with the resident.. I agree with assessment and plan of care.  Supervising Physician:  Hever Zavala MD  [facility]

## 2018-02-08 NOTE — PROGRESS NOTES
Lab results noted. The 10-year ASCVD risk score (Blas SILVA Jr, et al., 2013) is: 4.4%    Values used to calculate the score:      Age: 47 years      Sex: Female      Is Non- : No      Diabetic: Yes      Tobacco smoker: Yes      Systolic Blood Pressure: 126 mmHg      Is BP treated: Yes      HDL Cholesterol: 60 mg/dL      Total Cholesterol: 158 mg/dL  No need to prescribe statin.

## 2018-02-28 ENCOUNTER — TRANSFERRED RECORDS (OUTPATIENT)
Dept: HEALTH INFORMATION MANAGEMENT | Facility: CLINIC | Age: 48
End: 2018-02-28

## 2018-03-09 ENCOUNTER — TELEPHONE (OUTPATIENT)
Dept: FAMILY MEDICINE | Facility: CLINIC | Age: 48
End: 2018-03-09

## 2018-03-09 NOTE — TELEPHONE ENCOUNTER
Attempted to reach patient to follow up from recent UR visit for nodules on bilateral LE. Patient states that she has been diagnosed with oral cancer although I do not see this in her chart so will need to attain where this was diagnosed and attain [facility].     3/12/18 Call to patient. She is at work so cannot talk but states that she will call to RS her appointment as soon as she can.

## 2018-03-13 DIAGNOSIS — F41.9 ANXIETY: ICD-10-CM

## 2018-03-13 DIAGNOSIS — I10 BENIGN ESSENTIAL HYPERTENSION: ICD-10-CM

## 2018-03-13 DIAGNOSIS — R07.89 ATYPICAL CHEST PAIN: ICD-10-CM

## 2018-03-13 DIAGNOSIS — E66.09 NON MORBID OBESITY DUE TO EXCESS CALORIES: ICD-10-CM

## 2018-03-13 DIAGNOSIS — I10 ESSENTIAL HYPERTENSION WITH GOAL BLOOD PRESSURE LESS THAN 140/90: ICD-10-CM

## 2018-03-13 DIAGNOSIS — K21.9 GASTROESOPHAGEAL REFLUX DISEASE, ESOPHAGITIS PRESENCE NOT SPECIFIED: ICD-10-CM

## 2018-03-15 RX ORDER — LISINOPRIL 10 MG/1
10 TABLET ORAL DAILY
Qty: 90 TABLET | Refills: 3 | Status: SHIPPED | OUTPATIENT
Start: 2018-03-15 | End: 2020-08-19

## 2018-07-23 ENCOUNTER — TRANSFERRED RECORDS (OUTPATIENT)
Dept: HEALTH INFORMATION MANAGEMENT | Facility: CLINIC | Age: 48
End: 2018-07-23

## 2018-08-06 ENCOUNTER — TELEPHONE (OUTPATIENT)
Dept: FAMILY MEDICINE | Facility: CLINIC | Age: 48
End: 2018-08-06

## 2018-08-06 NOTE — TELEPHONE ENCOUNTER
Attempted to contact patient following recent ED visit for URI. NA, will attempt again.  Insurance?

## 2019-09-05 ENCOUNTER — TRANSFERRED RECORDS (OUTPATIENT)
Dept: HEALTH INFORMATION MANAGEMENT | Facility: CLINIC | Age: 49
End: 2019-09-05

## 2019-09-05 ENCOUNTER — SURGERY - HEALTHEAST (OUTPATIENT)
Dept: CARDIOLOGY | Facility: CLINIC | Age: 49
End: 2019-09-05

## 2019-09-05 ASSESSMENT — MIFFLIN-ST. JEOR: SCORE: 1281.33

## 2019-09-06 ENCOUNTER — TRANSFERRED RECORDS (OUTPATIENT)
Dept: HEALTH INFORMATION MANAGEMENT | Facility: CLINIC | Age: 49
End: 2019-09-06

## 2019-09-06 ASSESSMENT — MIFFLIN-ST. JEOR: SCORE: 1368.25

## 2019-09-07 ASSESSMENT — MIFFLIN-ST. JEOR: SCORE: 1333.04

## 2019-09-09 ENCOUNTER — TELEPHONE (OUTPATIENT)
Dept: FAMILY MEDICINE | Facility: CLINIC | Age: 49
End: 2019-09-09

## 2019-09-09 NOTE — TELEPHONE ENCOUNTER
I called to check up on the pt and help the pt setup a hospital follow up. The pt did not answer her phone, so I left a vm for the pt to give me a call back.

## 2019-09-10 ENCOUNTER — TELEPHONE (OUTPATIENT)
Dept: FAMILY MEDICINE | Facility: CLINIC | Age: 49
End: 2019-09-10

## 2019-09-10 NOTE — TELEPHONE ENCOUNTER
I called to check up on he pt and help the pt setup a hospital follow up.  The pt did not answer her phone, so I left a vm for the pt to give me a call back.

## 2019-12-20 ENCOUNTER — TRANSFERRED RECORDS (OUTPATIENT)
Dept: HEALTH INFORMATION MANAGEMENT | Facility: CLINIC | Age: 49
End: 2019-12-20

## 2019-12-23 ENCOUNTER — TELEPHONE (OUTPATIENT)
Dept: FAMILY MEDICINE | Facility: CLINIC | Age: 49
End: 2019-12-23

## 2019-12-24 ENCOUNTER — TELEPHONE (OUTPATIENT)
Dept: FAMILY MEDICINE | Facility: CLINIC | Age: 49
End: 2019-12-24

## 2019-12-24 NOTE — TELEPHONE ENCOUNTER
I called to check up on the pt and help the pt setup a hospital follow up. The pt did not answer her phone, and the pt vm was not yet setup.

## 2019-12-31 ENCOUNTER — TELEPHONE (OUTPATIENT)
Dept: FAMILY MEDICINE | Facility: CLINIC | Age: 49
End: 2019-12-31

## 2019-12-31 NOTE — TELEPHONE ENCOUNTER
SW reached out to try and schedule HFU for pt. Listed # not accepting calls. Phone then began to ring and went to busy signal.

## 2020-01-30 ENCOUNTER — TRANSFERRED RECORDS (OUTPATIENT)
Dept: HEALTH INFORMATION MANAGEMENT | Facility: CLINIC | Age: 50
End: 2020-01-30

## 2020-01-31 ENCOUNTER — TRANSFERRED RECORDS (OUTPATIENT)
Dept: HEALTH INFORMATION MANAGEMENT | Facility: CLINIC | Age: 50
End: 2020-01-31

## 2020-02-03 ENCOUNTER — TELEPHONE (OUTPATIENT)
Dept: FAMILY MEDICINE | Facility: CLINIC | Age: 50
End: 2020-02-03

## 2020-02-03 NOTE — TELEPHONE ENCOUNTER
Date of discharge: 01/31/2020  Facility of discharge: [facility]  Patient concerns about condition: No concerns at this time.  Patient concerns about medications: No concerns at this time.  Full med reconciliation will be completed at clinic visit.  Patient concerns about transitioning: No concerns at this time.  Clinic office visit appointment date: 02/05/2020  Patient reminded to bring all medications (prescription and over-the-counter) to clinic appointment: Yes    Using the date of discharge as day 1, the 30th day post discharge is 02/29/2020.

## 2020-02-05 ENCOUNTER — TELEPHONE (OUTPATIENT)
Dept: FAMILY MEDICINE | Facility: CLINIC | Age: 50
End: 2020-02-05

## 2020-02-05 NOTE — TELEPHONE ENCOUNTER
I got a message from [facility] about calling the pt and asking her about her medical insurance.  I had scheduled her to come in today, but did not realize that she has Medicare, but part A only. I called to talk to the pt, pt stated that she only has Medicare, and part A only.  She stated that she did not have any secondary insurances.  The pt is only working part time, and is not getting any Social Security income. I told her that she needs to go apply for [place], she should qualify for [place], since she has no other income except from her part time job. Pt stated that once she is up and about, she will go apply.  The pt did cancel her appointment today.

## 2020-02-07 ENCOUNTER — COMMUNICATION - HEALTHEAST (OUTPATIENT)
Dept: SCHEDULING | Facility: CLINIC | Age: 50
End: 2020-02-07

## 2020-02-20 ENCOUNTER — TELEPHONE (OUTPATIENT)
Dept: FAMILY MEDICINE | Facility: CLINIC | Age: 50
End: 2020-02-20

## 2020-04-23 ENCOUNTER — TRANSFERRED RECORDS (OUTPATIENT)
Dept: HEALTH INFORMATION MANAGEMENT | Facility: CLINIC | Age: 50
End: 2020-04-23

## 2020-08-19 ENCOUNTER — OFFICE VISIT (OUTPATIENT)
Dept: FAMILY MEDICINE | Facility: CLINIC | Age: 50
End: 2020-08-19
Payer: MEDICARE

## 2020-08-19 VITALS
BODY MASS INDEX: 31.28 KG/M2 | RESPIRATION RATE: 16 BRPM | OXYGEN SATURATION: 100 % | DIASTOLIC BLOOD PRESSURE: 11 MMHG | HEIGHT: 62 IN | WEIGHT: 170 LBS | HEART RATE: 94 BPM | SYSTOLIC BLOOD PRESSURE: 154 MMHG | TEMPERATURE: 98.3 F

## 2020-08-19 DIAGNOSIS — B35.4 TINEA CORPORIS: Primary | ICD-10-CM

## 2020-08-19 DIAGNOSIS — I50.9 CHRONIC CONGESTIVE HEART FAILURE, UNSPECIFIED HEART FAILURE TYPE (H): ICD-10-CM

## 2020-08-19 DIAGNOSIS — J44.9 CHRONIC OBSTRUCTIVE PULMONARY DISEASE, UNSPECIFIED COPD TYPE (H): ICD-10-CM

## 2020-08-19 DIAGNOSIS — I10 BENIGN ESSENTIAL HYPERTENSION: ICD-10-CM

## 2020-08-19 RX ORDER — CARVEDILOL 3.12 MG/1
3.12 TABLET ORAL 2 TIMES DAILY
COMMUNITY
Start: 2019-12-20 | End: 2020-08-19

## 2020-08-19 RX ORDER — LISINOPRIL 5 MG/1
5 TABLET ORAL DAILY
Qty: 30 TABLET | Refills: 0 | Status: SHIPPED | OUTPATIENT
Start: 2020-08-19 | End: 2021-03-18

## 2020-08-19 RX ORDER — FUROSEMIDE 40 MG
40 TABLET ORAL DAILY
Qty: 30 TABLET | Refills: 0 | Status: SHIPPED | OUTPATIENT
Start: 2020-08-19 | End: 2021-03-18

## 2020-08-19 RX ORDER — LISINOPRIL 5 MG/1
5 TABLET ORAL DAILY
COMMUNITY
Start: 2019-12-20 | End: 2020-08-19

## 2020-08-19 RX ORDER — FUROSEMIDE 40 MG
40 TABLET ORAL DAILY
COMMUNITY
Start: 2019-12-20 | End: 2020-08-19

## 2020-08-19 RX ORDER — CARVEDILOL 3.12 MG/1
3.12 TABLET ORAL 2 TIMES DAILY WITH MEALS
Qty: 60 TABLET | Refills: 0 | Status: SHIPPED | OUTPATIENT
Start: 2020-08-19 | End: 2021-03-18

## 2020-08-19 RX ORDER — ALBUTEROL SULFATE 90 UG/1
2 AEROSOL, METERED RESPIRATORY (INHALATION) EVERY 6 HOURS PRN
Qty: 1 INHALER | Refills: 1 | Status: SHIPPED | OUTPATIENT
Start: 2020-08-19

## 2020-08-19 RX ORDER — CLOTRIMAZOLE 1 %
CREAM (GRAM) TOPICAL 2 TIMES DAILY
Qty: 113 G | Refills: 0 | Status: SHIPPED | OUTPATIENT
Start: 2020-08-19 | End: 2020-08-26

## 2020-08-19 ASSESSMENT — MIFFLIN-ST. JEOR: SCORE: 1352.61

## 2020-08-19 NOTE — Clinical Note
FYI - seeing you next week to re-establish care for HTN, CHF, COPD. I had them schedule you a 40 minute block. Hopefully her rash is improving by then!

## 2020-08-19 NOTE — PATIENT INSTRUCTIONS
Please follow up in 1 week to see how your rash is going. We have sent refills of some of your medications.
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
## 2020-08-24 NOTE — PROGRESS NOTES
I have personally reviewed the history and examination as documented by Dr. Buck.  I was present during key portions of the visit and agree with the assessment and plan as documented for 50 yr old female with HTN, CHF, COPD here for rash consistent w/ tinea corporis. Topical tx given.  Precautions given. Anticipatory guidance given.     Praneeth Bashir MD  August 23, 2020  9:09 PM

## 2020-09-10 ENCOUNTER — COMMUNICATION - HEALTHEAST (OUTPATIENT)
Dept: SCHEDULING | Facility: CLINIC | Age: 50
End: 2020-09-10

## 2020-09-10 ENCOUNTER — COMMUNICATION - HEALTHEAST (OUTPATIENT)
Dept: CARDIOLOGY | Facility: CLINIC | Age: 50
End: 2020-09-10

## 2020-09-11 ENCOUNTER — TELEPHONE (OUTPATIENT)
Dept: FAMILY MEDICINE | Facility: CLINIC | Age: 50
End: 2020-09-11

## 2020-09-11 ENCOUNTER — TRANSFERRED RECORDS (OUTPATIENT)
Dept: HEALTH INFORMATION MANAGEMENT | Facility: CLINIC | Age: 50
End: 2020-09-11

## 2020-09-11 ENCOUNTER — OFFICE VISIT - HEALTHEAST (OUTPATIENT)
Dept: CARDIOLOGY | Facility: CLINIC | Age: 50
End: 2020-09-11

## 2020-09-11 DIAGNOSIS — I50.22 CHRONIC SYSTOLIC CONGESTIVE HEART FAILURE (H): ICD-10-CM

## 2020-09-11 DIAGNOSIS — I50.21 ACUTE SYSTOLIC HEART FAILURE (H): ICD-10-CM

## 2020-09-11 DIAGNOSIS — R07.9 CHEST PAIN, UNSPECIFIED TYPE: ICD-10-CM

## 2020-09-11 LAB
ANION GAP SERPL CALCULATED.3IONS-SCNC: 9 MMOL/L (ref 5–18)
BUN SERPL-MCNC: 16 MG/DL (ref 8–22)
CALCIUM SERPL-MCNC: 9.9 MG/DL (ref 8.5–10.5)
CHLORIDE BLD-SCNC: 101 MMOL/L (ref 98–107)
CO2 SERPL-SCNC: 28 MMOL/L (ref 22–31)
CREAT SERPL-MCNC: 1 MG/DL (ref 0.6–1.1)
GFR SERPL CREATININE-BSD FRML MDRD: 59 ML/MIN/1.73M2
GLUCOSE BLD-MCNC: 123 MG/DL (ref 70–125)
MAGNESIUM SERPL-MCNC: 2 MG/DL (ref 1.8–2.6)
POTASSIUM BLD-SCNC: 5.5 MMOL/L (ref 3.5–5)
SODIUM SERPL-SCNC: 138 MMOL/L (ref 136–145)

## 2020-09-11 NOTE — TELEPHONE ENCOUNTER
I got the pt ED discharge paperwork, I called to check up on the pt and help setup a ED follow up. I talked to the pt ex-boyfriend. He stated that the pt and him broke up, and she used his number for a contact. He just wanted me to take his number out of our contact.

## 2020-09-18 ENCOUNTER — COMMUNICATION - HEALTHEAST (OUTPATIENT)
Dept: CARDIOLOGY | Facility: CLINIC | Age: 50
End: 2020-09-18

## 2020-09-21 ENCOUNTER — COMMUNICATION - HEALTHEAST (OUTPATIENT)
Dept: CARDIOLOGY | Facility: CLINIC | Age: 50
End: 2020-09-21

## 2020-09-21 DIAGNOSIS — I50.23 ACUTE ON CHRONIC SYSTOLIC CHF (CONGESTIVE HEART FAILURE) (H): ICD-10-CM

## 2020-09-28 ENCOUNTER — HOSPITAL ENCOUNTER (OUTPATIENT)
Dept: CARDIOLOGY | Facility: HOSPITAL | Age: 50
Discharge: HOME | End: 2020-09-28
Attending: INTERNAL MEDICINE

## 2020-09-28 DIAGNOSIS — I50.22 CHRONIC SYSTOLIC CONGESTIVE HEART FAILURE (H): ICD-10-CM

## 2020-09-28 ASSESSMENT — MIFFLIN-ST. JEOR: SCORE: 1344.83

## 2020-09-29 LAB
AORTIC ROOT: 2.5 CM
AORTIC VALVE MEAN VELOCITY: 122 CM/S
ASCENDING AORTA: 2.6 CM
AV DIMENSIONLESS INDEX VTI: 0.7
AV MEAN GRADIENT: 7 MMHG
AV PEAK GRADIENT: 11.3 MMHG
AV VALVE AREA: 2.3 CM2
BSA FOR ECHO PROCEDURE: 1.83 M2
CV BLOOD PRESSURE: ABNORMAL MMHG
CV ECHO HEIGHT: 62 IN
CV ECHO WEIGHT: 169 LBS
DOP CALC AO PEAK VEL: 168 CM/S
DOP CALC AO VTI: 35.9 CM
DOP CALC LVOT AREA: 3.14 CM2
DOP CALC LVOT DIAMETER: 2 CM
DOP CALC LVOT STROKE VOLUME: 82 CM3
DOP CALC MV VTI: 39.5 CM
DOP CALCLVOT PEAK VEL VTI: 26.1 CM
EJECTION FRACTION: 45 % (ref 55–75)
FRACTIONAL SHORTENING: 25.5 % (ref 28–44)
INTERVENTRICULAR SEPTUM IN END DIASTOLE: 1 CM (ref 0.6–0.9)
IVS/PW RATIO: 0.9
LA AREA 1: 31.2 CM2
LA AREA 2: 26.8 CM2
LEFT ATRIUM AREA: 31.2 CM2
LEFT ATRIUM LENGTH: 7 CM
LEFT ATRIUM SIZE: 4.2 CM
LEFT ATRIUM TO AORTIC ROOT RATIO: 1.68 NO UNITS
LEFT ATRIUM VOLUME INDEX: 55.5 ML/M2
LEFT ATRIUM VOLUME: 101.5 ML
LEFT VENTRICLE CARDIAC INDEX: 2.4 L/MIN/M2
LEFT VENTRICLE CARDIAC OUTPUT: 4.3 L/MIN
LEFT VENTRICLE DIASTOLIC VOLUME INDEX: 92.9 CM3/M2 (ref 29–61)
LEFT VENTRICLE DIASTOLIC VOLUME: 170 CM3 (ref 46–106)
LEFT VENTRICLE HEART RATE: 53 BPM
LEFT VENTRICLE MASS INDEX: 124.3 G/M2
LEFT VENTRICLE SYSTOLIC VOLUME INDEX: 50.8 CM3/M2 (ref 8–24)
LEFT VENTRICLE SYSTOLIC VOLUME: 93 CM3 (ref 14–42)
LEFT VENTRICULAR INTERNAL DIMENSION IN DIASTOLE: 5.5 CM (ref 3.8–5.2)
LEFT VENTRICULAR INTERNAL DIMENSION IN SYSTOLE: 4.1 CM (ref 2.2–3.5)
LEFT VENTRICULAR MASS: 227.4 G
LEFT VENTRICULAR OUTFLOW TRACT MEAN GRADIENT: 4 MMHG
LEFT VENTRICULAR OUTFLOW TRACT MEAN VELOCITY: 95.3 CM/S
LEFT VENTRICULAR POSTERIOR WALL IN END DIASTOLE: 1.1 CM (ref 0.6–0.9)
LV STROKE VOLUME INDEX: 44.8 ML/M2
MITRAL REGURGITANT VELOCITY TIME INTEGRAL: 198 CM
MITRAL VALVE E/A RATIO: 1.1
MITRAL VALVE MEAN INFLOW VELOCITY: 57.6 CM/S
MITRAL VALVE PEAK VELOCITY: 105 CM/S
MR MEAN GRADIENT: 75 MMHG
MR MEAN VELOCITY: 411 CM/S
MR PEAK GRADIENT: 131.3 MMHG
MV AREA VTI: 2.07 CM2
MV AVERAGE E/E' RATIO: 13.1 CM/S
MV DECELERATION TIME: 285 MS
MV E'TISSUE VEL-LAT: 7.99 CM/S
MV E'TISSUE VEL-MED: 5.75 CM/S
MV LATERAL E/E' RATIO: 11.2
MV MEAN GRADIENT: 2 MMHG
MV MEDIAL E/E' RATIO: 15.6
MV PEAK A VELOCITY: 82.3 CM/S
MV PEAK E VELOCITY: 89.8 CM/S
MV PEAK GRADIENT: 4.4 MMHG
MV VALVE AREA BY CONTINUITY EQUATION: 2.1 CM2
NUC REST DIASTOLIC VOLUME INDEX: 2704 LBS
NUC REST SYSTOLIC VOLUME INDEX: 62 IN
PISA MR PEAK VEL: 573 CM/S
TRICUSPID REGURGITATION PEAK PRESSURE GRADIENT: 29.4 MMHG
TRICUSPID VALVE ANULAR PLANE SYSTOLIC EXCURSION: 2.1 CM
TRICUSPID VALVE PEAK REGURGITANT VELOCITY: 271 CM/S

## 2020-10-02 ENCOUNTER — AMBULATORY - HEALTHEAST (OUTPATIENT)
Dept: CARDIOLOGY | Facility: CLINIC | Age: 50
End: 2020-10-02

## 2020-10-02 ENCOUNTER — OFFICE VISIT - HEALTHEAST (OUTPATIENT)
Dept: CARDIOLOGY | Facility: CLINIC | Age: 50
End: 2020-10-02

## 2020-10-02 DIAGNOSIS — I10 ESSENTIAL HYPERTENSION: ICD-10-CM

## 2020-10-02 DIAGNOSIS — F17.200 TOBACCO USE DISORDER: ICD-10-CM

## 2020-10-02 DIAGNOSIS — I50.22 CHRONIC SYSTOLIC CONGESTIVE HEART FAILURE (H): ICD-10-CM

## 2020-10-02 LAB — POTASSIUM BLD-SCNC: 5 MMOL/L (ref 3.5–5)

## 2020-10-02 ASSESSMENT — MIFFLIN-ST. JEOR: SCORE: 1337.57

## 2021-03-18 ENCOUNTER — OFFICE VISIT (OUTPATIENT)
Dept: FAMILY MEDICINE | Facility: CLINIC | Age: 51
End: 2021-03-18
Payer: MEDICARE

## 2021-03-18 VITALS
OXYGEN SATURATION: 99 % | SYSTOLIC BLOOD PRESSURE: 172 MMHG | RESPIRATION RATE: 20 BRPM | DIASTOLIC BLOOD PRESSURE: 113 MMHG | BODY MASS INDEX: 30.53 KG/M2 | WEIGHT: 168 LBS | HEART RATE: 86 BPM

## 2021-03-18 DIAGNOSIS — R07.9 CHEST PAIN, UNSPECIFIED TYPE: ICD-10-CM

## 2021-03-18 DIAGNOSIS — I50.9 CHRONIC CONGESTIVE HEART FAILURE, UNSPECIFIED HEART FAILURE TYPE (H): ICD-10-CM

## 2021-03-18 DIAGNOSIS — I10 BENIGN ESSENTIAL HYPERTENSION: ICD-10-CM

## 2021-03-18 DIAGNOSIS — K21.9 GASTROESOPHAGEAL REFLUX DISEASE WITHOUT ESOPHAGITIS: ICD-10-CM

## 2021-03-18 DIAGNOSIS — E11.9 DIABETES MELLITUS WITHOUT COMPLICATION (H): ICD-10-CM

## 2021-03-18 DIAGNOSIS — Z86.73 HISTORY OF CVA (CEREBROVASCULAR ACCIDENT): Primary | ICD-10-CM

## 2021-03-18 PROBLEM — I50.22 CHRONIC SYSTOLIC CONGESTIVE HEART FAILURE (H): Status: ACTIVE | Noted: 2020-10-02

## 2021-03-18 PROBLEM — N20.0 NEPHROLITHIASIS: Status: ACTIVE | Noted: 2021-03-18

## 2021-03-18 PROCEDURE — 99214 OFFICE O/P EST MOD 30 MIN: CPT | Mod: GC | Performed by: STUDENT IN AN ORGANIZED HEALTH CARE EDUCATION/TRAINING PROGRAM

## 2021-03-18 RX ORDER — LISINOPRIL 5 MG/1
10 TABLET ORAL DAILY
Qty: 60 TABLET | Refills: 1 | Status: SHIPPED | OUTPATIENT
Start: 2021-03-18 | End: 2021-03-29 | Stop reason: DRUGHIGH

## 2021-03-18 RX ORDER — CARVEDILOL 6.25 MG/1
6.25 TABLET ORAL
COMMUNITY
Start: 2020-10-02 | End: 2021-08-09

## 2021-03-18 RX ORDER — FUROSEMIDE 40 MG
40 TABLET ORAL DAILY
Qty: 60 TABLET | Refills: 1 | Status: SHIPPED | OUTPATIENT
Start: 2021-03-18 | End: 2021-08-31

## 2021-03-18 RX ORDER — CARVEDILOL 3.12 MG/1
6.25 TABLET ORAL 2 TIMES DAILY WITH MEALS
Qty: 120 TABLET | Refills: 3 | Status: SHIPPED | OUTPATIENT
Start: 2021-03-18 | End: 2021-09-14

## 2021-03-18 RX ORDER — NITROGLYCERIN 0.4 MG/1
0.4 TABLET SUBLINGUAL EVERY 5 MIN PRN
Qty: 25 TABLET | Refills: 3 | Status: SHIPPED | OUTPATIENT
Start: 2021-03-18

## 2021-03-18 RX ORDER — METFORMIN HCL 500 MG
500 TABLET, EXTENDED RELEASE 24 HR ORAL
Qty: 60 TABLET | Refills: 1 | Status: SHIPPED | OUTPATIENT
Start: 2021-03-18 | End: 2021-08-09

## 2021-03-18 NOTE — PROGRESS NOTES
HPI:       Lola Arias is a 50 year old  female with PMH significant for HTN, COPD, CHF, CVA, history of diabetes, alcohol use disorder who presents for:      1. ED follow up visit 3/9/2021  -Was at daughters house visiting, was eating raw cookie dough, and passed out, lost three hours of time.   - Was having stroke-like symptoms. Presenting symptoms were slurred speech and balance symptoms. Blurred vision (felt like they were not alighted), has recently improved.   - Imaging in ED negative for acute infarction.  - Left AMA from ED because she was hungry.   -They did not put her on any new medications.  - Also noted headache  - Tingling on top of head, since two weeks ago.   - Still have not fully recovered. Noticed passing out a few days ago as well? Unsure.   - Has been experiencing continued memory loss, trouble speaking  - No numbness or weakness.    -Has had two CVAs in the past, does not remember her presenting symptoms.       2. Medication Management  -Has run out of all home medications a while ago  -Needs refills.   -Has not followed up with cardiology recently.              PMHX:     Patient Active Problem List   Diagnosis     Chronic back pain     Alcohol dependence in remission (H)     Diabetes mellitus, type 2 (H)     Bipolar disorder (H)     Depression with anxiety     H/O tubal ligation     Hypothyroidism     GERD (gastroesophageal reflux disease)     Tobacco dependence syndrome     Irritable colon     Displacement of lumbar intervertebral disc     Peptic ulcer     Depressed mood     Obesity     Essential hypertension     Abnormal cytological findings in female genital organs     Lumbar degenerative disc disease       Current Outpatient Medications   Medication Sig Dispense Refill     albuterol (PROAIR HFA/PROVENTIL HFA/VENTOLIN HFA) 108 (90 Base) MCG/ACT inhaler Inhale 2 puffs into the lungs every 6 hours as needed for shortness of breath / dyspnea or wheezing 1 Inhaler 1     aspirin 81  MG chewable tablet Take 1 tablet (81 mg) by mouth daily 108 tablet 3     carvedilol (COREG) 3.125 MG tablet Take 1 tablet (3.125 mg) by mouth 2 times daily (with meals) 60 tablet 0     citalopram (CELEXA) 20 MG tablet Take 1 tablet (20 mg) by mouth daily 30 tablet 3     cyclobenzaprine (FLEXERIL) 5 MG tablet Take 1-2 tablets (5-10 mg) by mouth 2 times daily as needed for muscle spasms 30 tablet 0     furosemide (LASIX) 40 MG tablet Take 1 tablet (40 mg) by mouth daily 30 tablet 0     lamoTRIgine (LAMICTAL) 200 MG tablet Take 1 tablet (200 mg) by mouth daily 30 tablet 3     lamoTRIgine (LAMICTAL) 25 MG tablet Take 1 pill daily for two weeks. Then on weeks 3 and 4, take two pills (50 mg) daily. Then on weeks 5-6, take 4 pills (100 mg). 98 tablet 3     lisinopril (ZESTRIL) 5 MG tablet Take 1 tablet (5 mg) by mouth daily 30 tablet 0     metFORMIN (GLUCOPHAGE) 1000 MG tablet Take 1 tablet (1,000 mg) by mouth 2 times daily (with meals) 180 tablet 3     nitroglycerin (NITROSTAT) 0.4 MG SL tablet Place 1 tablet (0.4 mg) under the tongue every 5 minutes as needed for chest pain If you are still having symptoms after 3 doses (15 minutes) call 911. 25 tablet 3     omeprazole (PRILOSEC) 20 MG CR capsule Take 1 capsule (20 mg) by mouth daily 30 capsule 11     STATIN NOT PRESCRIBED, INTENTIONAL, Statin not prescribed intentionally due to Other LDL wnl, ASCVD <7.5% (This option does not exclude patient from measure) 0 each 0     tiZANidine (ZANAFLEX) 2 MG tablet Take 1-2 tablets (2-4 mg) by mouth 3 times daily as needed for muscle spasms 90 tablet 0       Social History: Reviewed    Family History: Reviewed       Allergies   Allergen Reactions     Nkda [No Known Drug Allergies]        No results found for this or any previous visit (from the past 24 hour(s)).         Review of Systems:   10 point ROS negative except noted in above in HPI         Physical Exam:     Vitals:    03/18/21 1615 03/18/21 1619   BP: (!) 190/100 (!)  172/113   BP Location: Right arm    Patient Position: Sitting    Cuff Size: Adult Regular    Pulse: 86    Resp: 20    SpO2: 99%    Weight: 76.2 kg (168 lb)      Body mass index is 30.53 kg/m .    GENERAL APPEARANCE: healthy, alert and no distress,  EYES: Eyes grossly normal to inspection,  PERRL  NECK: no adenopathy, no asymmetry, masses, or scars and thyroid normal to palpation  RESP: lungs clear to auscultation - no rales, rhonchi or wheezes  CV: regular rate and rhythm,  and no murmur, click,  rub or gallop  MS: extremities normal- no gross deformities noted  SKIN: no suspicious lesions or rashes  NEURO: Normal strength and tone, sensory exam grossly normal, mentation appears intact and speech slurred, not worsened since onset.   PSYCH: mood and affect normal/bright      Assessment and Plan     (Z86.73) History of CVA (cerebrovascular accident)  (primary encounter diagnosis)  Comment: Went to ED 3/9 with stroke-like symptoms. They wanted to admit her based on her blood pressures. Imaging did not show signs of acute infarction. Will attempt to restart home meds that need refilling. Slurred speech and memory loss most concerning to her at this time.   Plan: Currently taking aspirin 81mg at home. Symptoms have not worsened.    - May consider neuro referral following next visit.    -Return in 1 week to review progress with medication adherence.     (I10) Benign essential hypertension  Comment: /113 in clinic. Restart home meds that have not been refilled.   Plan: lisinopril (ZESTRIL) 5 MG tablet          (I50.9) Chronic congestive heart failure, unspecified heart failure type (H)  Comment: Restart medications. At this time want to keep things simple for her so will see her back in clinic soon to discuss further steps.   Plan: carvedilol (COREG) 3.125 MG tablet, furosemide         (LASIX) 40 MG tablet        - See back to clinic in 1 week.         - Consider referral back to cardiology at next visit.     (K21.9)  Gastroesophageal reflux disease without esophagitis  Comment: Refill meds.   Plan: omeprazole (PRILOSEC) 20 MG DR capsule            (E11.9) Diabetes mellitus without complication (H)  Comment: Refill meds.   Plan: metFORMIN (GLUCOPHAGE-XR) 500 MG 24 hr tablet            (R07.9) Chest pain, unspecified type  Comment: Does resolve with nitro   Plan: nitroGLYcerin (NITROSTAT) 0.4 MG sublingual         tablet                Options for treatment and follow-up care were reviewed with the patient and/or guardian. Lola Arias and/or guardian engaged in the decision making process and verbalized understanding of the options discussed and agreed with the final plan.      Abner Hyde DO  [facility] Resident, PGY-1  Pager: [phone]    Precepted today with: Hever Zavala MD

## 2021-03-18 NOTE — PROGRESS NOTES
Preceptor Attestation:   Patient seen, evaluated and discussed with the resident. I have verified the content of the note, which accurately reflects my assessment of the patient and the plan of care.  Supervising Physician:Hever Zavala MD  [facility]

## 2021-03-18 NOTE — PATIENT INSTRUCTIONS
Thank you for coming to [facility].     No labs today.   meds  Return in 1 week     **If you had lab testing today and your results are reassuring or normal they will be be mailed to you within 7 days.   **If the lab tests need quick action we will call you with the results.  If you need any refills please call your pharmacy and they will contact us.  If you have any concerns about today's visit or wish to schedule another appointment please call our office during normal business hours [phone] (8-5:00 M-F)  If you have urgent medical concerns please call [phone] at any time of the day.  If you a medical emergency please call [phone]  Again thank you for choosing [facility] and please let us know how we can best partner with you to improve you and your family's health.

## 2021-03-29 ENCOUNTER — APPOINTMENT (OUTPATIENT)
Dept: FAMILY MEDICINE | Facility: CLINIC | Age: 51
End: 2021-03-29
Payer: MEDICARE

## 2021-03-29 ENCOUNTER — OFFICE VISIT (OUTPATIENT)
Dept: FAMILY MEDICINE | Facility: CLINIC | Age: 51
End: 2021-03-29
Payer: MEDICARE

## 2021-03-29 VITALS
HEART RATE: 91 BPM | RESPIRATION RATE: 18 BRPM | WEIGHT: 165.8 LBS | BODY MASS INDEX: 27.63 KG/M2 | SYSTOLIC BLOOD PRESSURE: 147 MMHG | DIASTOLIC BLOOD PRESSURE: 90 MMHG | OXYGEN SATURATION: 99 % | HEIGHT: 65 IN

## 2021-03-29 DIAGNOSIS — R87.612 PAP SMEAR ABNORMALITY OF CERVIX WITH LGSIL: ICD-10-CM

## 2021-03-29 DIAGNOSIS — I50.9 CHRONIC CONGESTIVE HEART FAILURE, UNSPECIFIED HEART FAILURE TYPE (H): ICD-10-CM

## 2021-03-29 DIAGNOSIS — I10 BENIGN ESSENTIAL HYPERTENSION: ICD-10-CM

## 2021-03-29 DIAGNOSIS — Z86.73 HISTORY OF CVA (CEREBROVASCULAR ACCIDENT): ICD-10-CM

## 2021-03-29 DIAGNOSIS — F17.200 TOBACCO DEPENDENCE SYNDROME: ICD-10-CM

## 2021-03-29 DIAGNOSIS — Z11.59 NEED FOR HEPATITIS C SCREENING TEST: ICD-10-CM

## 2021-03-29 DIAGNOSIS — Z11.4 SCREENING FOR HUMAN IMMUNODEFICIENCY VIRUS: ICD-10-CM

## 2021-03-29 DIAGNOSIS — F32.A DEPRESSION, UNSPECIFIED DEPRESSION TYPE: ICD-10-CM

## 2021-03-29 DIAGNOSIS — Z11.59 NEED FOR HEPATITIS B SCREENING TEST: ICD-10-CM

## 2021-03-29 DIAGNOSIS — H91.93 DECREASED HEARING OF BOTH EARS: ICD-10-CM

## 2021-03-29 DIAGNOSIS — E11.9 DIABETES MELLITUS WITHOUT COMPLICATION (H): ICD-10-CM

## 2021-03-29 DIAGNOSIS — F14.10 NONDEPENDENT COCAINE ABUSE, EPISODIC (H): ICD-10-CM

## 2021-03-29 DIAGNOSIS — Z00.00 ENCOUNTER FOR MEDICARE ANNUAL WELLNESS EXAM: Primary | ICD-10-CM

## 2021-03-29 DIAGNOSIS — F10.21 ALCOHOL DEPENDENCE IN REMISSION (H): ICD-10-CM

## 2021-03-29 LAB
ANION GAP SERPL CALCULATED.3IONS-SCNC: 14 MMOL/L (ref 5–18)
BUN SERPL-MCNC: 23 MG/DL (ref 8–22)
CALCIUM SERPL-MCNC: 10.1 MG/DL (ref 8.5–10.5)
CHLORIDE SERPL-SCNC: 102 MMOL/L (ref 98–107)
CHOLEST SERPL-MCNC: 183 MG/DL
CHOLEST/HDLC SERPL: 2.5 RATIO
CO2 SERPL-SCNC: 21 MMOL/L (ref 22–31)
CREAT SERPL-MCNC: 0.91 MG/DL (ref 0.6–1.1)
GLUCOSE SERPL-MCNC: 211 MG/DL (ref 70–125)
HBA1C MFR BLD: 7.4 % (ref 4.1–5.7)
HDLC SERPL-MCNC: 72 MG/DL
HIV 1+2 AB+HIV1 P24 AG SERPL QL IA: NEGATIVE
LDLC SERPL CALC-MCNC: 88 MG/DL (ref 0–99)
POTASSIUM SERPL-SCNC: 3.9 MMOL/L (ref 3.5–5)
SODIUM SERPL-SCNC: 137 MMOL/L (ref 136–145)
TRIGL SERPL-MCNC: 118 MG/DL
VLDL-CHOLESTEROL: 24 MG/DL (ref 7–32)

## 2021-03-29 PROCEDURE — 83036 HEMOGLOBIN GLYCOSYLATED A1C: CPT | Performed by: FAMILY MEDICINE

## 2021-03-29 PROCEDURE — 80061 LIPID PANEL: CPT | Performed by: FAMILY MEDICINE

## 2021-03-29 PROCEDURE — G0438 PPPS, INITIAL VISIT: HCPCS | Performed by: FAMILY MEDICINE

## 2021-03-29 PROCEDURE — 36415 COLL VENOUS BLD VENIPUNCTURE: CPT | Performed by: FAMILY MEDICINE

## 2021-03-29 RX ORDER — LISINOPRIL 5 MG/1
15 TABLET ORAL DAILY
Qty: 90 TABLET | Refills: 1 | Status: SHIPPED | OUTPATIENT
Start: 2021-03-29 | End: 2021-06-28

## 2021-03-29 ASSESSMENT — MIFFLIN-ST. JEOR: SCORE: 1370

## 2021-03-29 NOTE — PROGRESS NOTES
Annual Wellness Visit for 65 years and older    HPI  This 50 year old female presents as an established patient of Dr. Darling, Maldonado Corrales who presents for an annual wellness visit.    Other issues patient wants to be addressed today:  Chief Complaint   Patient presents with     Medicare Visit     medicare      Patient Active Problem List   Diagnosis     Chronic back pain     Alcohol dependence in remission (H)     Diabetes mellitus, type 2 (H)     Bipolar disorder (H)     Depression with anxiety     H/O tubal ligation     Hypothyroidism     GERD (gastroesophageal reflux disease)     Tobacco dependence syndrome     Irritable bowel syndrome     Displacement of lumbar intervertebral disc     Peptic ulcer     Depressed mood     Obesity     Essential hypertension     LSIL (low grade squamous intraepithelial lesion) on Pap smear     Lumbar degenerative disc disease     Chronic systolic congestive heart failure (H)     Nephrolithiasis     Nondependent cocaine abuse, episodic (H)     Past Medical History:   Diagnosis Date     Alcohol dependence in remission (H) 11/8/2013     Bipolar disorder, unspecified (H) 11/15/2013     Chronic back pain 11/8/2013     Diabetes mellitus (H)      Kidney stones        Family History   Problem Relation Age of Onset     Other Cancer Father         Oral     Hypertension Father      Hypertension Maternal Grandmother      Diabetes Other         Paternal Uncle     Hypertension Other         Paternal Uncle     Asthma No family hx of      Problem List, Family History and past Medical History reviewed and  unchanged/updated.    Nursing Notes:   Alethea Vee, RN  3/29/2021  4:05 PM  Sign at exiting of workspace  Medicare Wellness Visit  Health Risk Assessment           Health Risk Assessment / Review of Systems     Constitutional: Any fevers or night sweats?  YES ngiht sweats almost daily, sleeps with a/c and fan. Denies snoring     Eyes:  Vision problems    YES last eye check 2.5years ago,  patient notes worsening vision, wears glasses, declined referral and stated she has an eye doctor    Hearing Do you feel you have hearing loss?   YES difficulties hearing with background noise    Cardiovascular: Any chest pain, fast or irregular heart beat, calf pain with walking?      YES left sided chest pain 2-3/week along with left sided breast pain. Describes the pain as pinching, duration depends on level of activity. Also endorses rapid heartbeat and diaphoresis during these episodes.           Respiratory:   Any breathing problems or cough?    YES patient endorses feeling winded when walking    Gastrointestinal: Any stomach or stool problems?    YES bloating and stomach pain daily, 7/10 or higher, sharp. Experiences after eating. Patient endorses history of IBS and diverticulitis     Genitourinary: Do you have difficulty controlling urination?    No    Muscles and Joints: Any joint stiffness or soreness?    YES muscle cramping in legs when sleeping/lying down, happens sometimes in the car as well. Endorses right hip pain when lying down and right knee pain when walking    Skin: Any concerning lesions or moles?   No     Nervous System: Any loss of strength or feeling, numbness or tingling, shaking, dizziness, or headache?   YES weakness/loss of strength in her hands post stroke, numbness/tingling/pins/needles/burning in feet, frequent headaches but has decreased to only 3 episodes post stroke    Mental Health: Any depression, anxiety or problems sleeping?     YES endorses depression, denies any current mental health care or medications. Endorses seeing her daughter helps with her depression, patient just also reunited with her son, patient smiling and had excited speech while talking about her family, going to be a grandmother soon for the first time, concern about her own parents and their aging. Patient also endorses great frustration with post-stroke symptoms including speech. Patient stated it is  difficult for people to understand her and she is having a difficult time adjusting to her changes    Cognition: Do you have any problems with your memory?   YES Patient endorses memory concerns, has a difficult time remembering small tasks. Often-times grabbing the wrong item. Patient told a story of going to light incense and putting it to her mouth like a cigarette instead. Patient informed writer of another time she grabbed a lit candle thinking it was her soda can. Patient endorses a lot of frustration with post-stroke symptoms and forgetfulness.            Medical Care     What other specialists or organizations are involved in your medical care?    Patient Care Team       Relationship Specialty Notifications Start End    Maldonado Darling MD PCP - General   8/18/20     Phone: [phone] Fax: [phone] [address] [facility] [address]    Maldonado Darling MD Assigned PCP   8/23/20     Phone: [phone] Fax: [phone] [address] [facility] [address]          Have you been to the ER or overnight in the hospital in the last year?   YES CVA & CHF (two separate visits)         Social History / Home Safety       Marital Status:Partnered  Who lives in your household? Self and boyfriend    Do you feel threatened or controlled by a partner, ex-partner or anyone in your life? No     Has anyone hurt you physically, for example by pushing, hitting, slapping or kicking you   or forcing you to have sex? No          Does your home have any of the following safety concerns; loose rugs in the hallway,  bathrooms with no grab bars by the tub or toilet, stairs with no handrails or poorly lit areas?  No     Do you need help with dressing yourself, bathing, eating or getting around your home?  No     Do you need help with the phone, transportation, shopping, preparing meals, housework, laundry, medications or managing money? YES boyfriend assists with medications due to patient  making mistakes post stroke with medications      Risk Behaviors and Healthy Habits     History   Smoking Status     Current Every Day Smoker     Packs/day: 0.50     Types: Cigarettes   Smokeless Tobacco     Never Used     Comment: Declined CIQ, not ready to quit. smoking about 10 a day     How many servings of fruits and vegetables do you eat a day? 2/day    Exercise: Patient denies dedicated exercise but stated she walks all day every day walking      Do you frequently drive without a seatbelt? No     Do you use tobacco?   YES 0.5ppd    Do you use any other drugs? No         Do you use alcohol?Yes  Number of drinks per day -  Number of drinking days a week 3 drinks/week      Frailty Assessment            Have you lost 10 or more pounds unintentionally in the previous year? No     How difficult is walking from one room to the other on the same level?not   No     Is it difficult to lift or carry something as heavy as 10 pounds?moderately   Yes    Compared with most (men/women) your age, would you say  that you are more active, less active, or about the same? Patient stated she is unsure   No    Fall Risk Assessment: Patient denies any falls within the last year. Timed up and go 9 seconds. Even and steady gait    Advance Directives:   Discussed with patient and family as appropriate. Has patient  completed advance directives and/or a living will? no  Provided blank copy, patient wishes to complete while in clinic today. Advised physician of notary available in clinic today    Alethea Vee RN    Are you sexually active?  Yes   If yes, with men, women, or both? Male  If yes, do you more than one current partner?No  If yes, are you using condoms?  No  Have you had any sexually transmitted infections? No   Any sexual concerns? No     FOR WOMEN  What year did you stop having periods? 2017  Any vaginal bleeding in the last year? No  Have you ever had an abnormal Pap smear? Yes, LSIL in 2011.    Frailty Assessment    1.  Weight loss (>5% in year)  No  Wt Readings from Last 5 Encounters:   03/29/21 75.2 kg (165 lb 12.8 oz)   03/18/21 76.2 kg (168 lb)   08/19/20 77.1 kg (170 lb)   02/07/18 83.6 kg (184 lb 3.2 oz)   05/17/17 80.2 kg (176 lb 12.8 oz)       2. Exhaustion (perceived effort for a given activity)   How difficult is walking from one room to the other on the same level?not     3. Weakness (handgrip strength)   How difficult is lifting or carrying something as heavy as 10 pounds? moderately Yes    4. Decreased physical activity    Compared with most (men/women) your age, would you say  that you are more active, less active, or about the same? About the same    5. Slow gait speed (timed up and go > 12 sec.)  No  FALL RISK ASSESSMENT 3/29/2021   Timed Up and Go Test/Seconds (13.5 is a fall risk; contact physician) 9     Frailty screen score: 1    Frail Assessment:1-2 Prefrail    EVALUATION OF COGNITIVE FUNCTION  Mood/affect:Normal  Appearance:Normal  Family member/caregiver input: NA    PHQ-2 Score:   PHQ-2 ( 1999 Pfizer) 3/29/2021 8/19/2020   Q1: Little interest or pleasure in doing things 0 0   Q2: Feeling down, depressed or hopeless 0 0   PHQ-2 Score 0 0       PHQ-9 Score:   [facility] Follow-up to PHQ 1/15/2016 1/3/2017   PHQ-9 9. Suicide Ideation past 2 weeks Not at all Not at all       Mini Cog Test:  Recall result:  3 points   Clock Draw Test result: Normal    Cognitive screen is:Negative      Other Assessments:  CV Risk based on Pooled Cohort Risk   The ASCVD Risk score (Blas SILVA Jr., et al., 2013) failed to calculate for the following reasons:    The patient has a prior MI or stroke diagnosis    Screening Lipid Level (covered every 5 years ): Recommended and patient accepted testing.  The ASCVD Risk score (Blas SILVA Jr., et al., 2013) failed to calculate for the following reasons:    The patient has a prior MI or stroke diagnosis  ASA use (>3% risk in 5 y):  Patient is on an ASA.  Cervical cancer screening:  Covered  until age 70  "every 2 years unless high risk):  Recommended and patient declined testing.  HIV screening (at risk ):  Recommended and patient accepted testing.      ECG (if done) not performed    Corrected Visual acuity: Needs eye exam, will refer  Hearing evaluation if done: No      Advance Directives: Discussed with patient and family as appropriate. Has patient  completed advance directives and/or a living will? Yes, completed today. Needs to be notarized.    Immunization History   Administered Date(s) Administered     Influenza (IIV3) PF 10/25/2014, 11/23/2015     Influenza Vaccine IM > 6 months Valent IIV4 10/25/2014, 11/23/2015     Tdap (Adacel,Boostrix) 06/24/2011     Tdap (Adult) Unspecified Formulation 06/24/2011     Reviewed Immunization Record Today - unclear hepatitis B immunization history. Will obtain titers.    Physical Exam  Vitals: BP (!) 147/90   Pulse 91   Resp 18   Ht 1.638 m (5' 4.5\")   Wt 75.2 kg (165 lb 12.8 oz)   SpO2 99%   BMI 28.02 kg/m    BMI= Body mass index is 28.02 kg/m .  EXAM:  Constitutional: healthy, alert and no distress   Cardiovascular: negative, PMI normal. No lifts, heaves, or thrills. RRR. No murmurs, clicks gallops or rub  Respiratory: negative, Percussion normal. Good diaphragmatic excursion. Lungs clear  Psychiatric: mentation appears normal, affect normal/bright, somewhat irritable but easily redirectable  Head: Normocephalic. No masses, lesions, tenderness or abnormalities  Neck: Neck supple. No adenopathy. Thyroid symmetric, normal size,, Carotids without bruits.  ENT: Bilateral obstructing cerumen in both canals, no neck nodes or sinus tenderness  Abdomen: Abdomen soft, non-tender. BS normal. No masses, organomegaly  NEURO: Gait normal. Reflexes normal and symmetric. Sensation grossly WNL.  SKIN: no suspicious lesions or rashes  LYMPH: Normal cervical lymph nodes  JOINT/EXTREMITIES: extremities normal- no gross deformities noted, gait normal and normal muscle tone    Assessment " and Plan:    Reviewed Preventive Services and Plan form with patient as specified in  Patient Instructions.  Positive findings on assessment: See below.  Lola was seen today for medicare visit.    Diagnoses and all orders for this visit:    Encounter for Medicare annual wellness exam    History of CVA (cerebrovascular accident)  Hx of previous CVAs. Recent assessment in the [facility] ED for stroke-like symptoms (syncope, garbled speech, vision changes) on 3/9/21 and was advised to stay for admission and work-up but left AMA.  Brain imaging without evidence of acute ischemia. No lingering deficits per patient.  -     Lipid Panel ([facility]) - Results < 1 hr  -     lisinopril (ZESTRIL) 5 MG tablet; Take 3 tablets (15 mg) by mouth daily    Diabetes mellitus without complication (H)  Last A1c in [facility] was 6.8 in Jan. 2020. On metformin alone.  -     Lipid Panel ([facility]) - Results < 1 hr  -     Hemoglobin A1c ([facility])  -     EYE ADULT REFERRAL; Future  -     Cancel: Basic Metabolic Panel ([facility]) - Results < 1 hr  - Continue current meds    Chronic congestive heart failure, unspecified heart failure type (H)  Evaluated by cardiology in Oct. 2020. Hx of non-ischemic cardiomyopathy with EF of 35-40% and asymptomatic. Inadequately controlled BP today despite resuming home medication regimen and reporting consistent use. Denies ongoing illicit drug use.  -     Lipid Panel ([facility]) - Results < 1 hr  -     Increase lisinopril (ZESTRIL) 5 MG tablet; Take 3 tablets (15 mg) by mouth daily  - Continue carvedilol and furosemide.     Benign essential hypertension  BP above goal of 130/80 on home regimen for the past 1.5 weeks.   -     Increase lisinopril (ZESTRIL) 5 MG tablet; Take 3 tablets (15 mg) by mouth daily  -     [facility] - repeated today given lisinopril adjustment    Depression, unspecified depression type  History of bipolar disorder (unclear type I or 2) and anxiety with comorbid  substance use disorder. Currently struggling with a lot of psychosocial stressors (see above) and worsening symptoms following previous CVAs. Interested in therapy to help manage these.   -     [facility] - MENTAL HEALTH REFERRAL  -    Tobacco dependence syndrome  Down to 0.5 ppd. Declines assistance at this time.    Need for hepatitis C screening test  Screening for human immunodeficiency virus  Need for hepatitis B screening test  Nondependent cocaine abuse, episodic (H)  Alcohol dependence in remission (H)  Hx of substance use with increased risk for the following infectious diseases. Uncertain immunization history for Hepatitis B. Screening done today.  -     Hepatitis C Antibody ([facility])  -     HIV Ag/Ab Screen [place] ([facility])  -     Hepatitis B Surface Ag ([facility])  -     Hepatitis B Surface Antibody      Decreased hearing of both ears  Accompanied by some impacted cerumen in bilateral ears.    - Return for ear clean out.  -     AUDIOLOGY ADULT REFERRAL; Future    Pap smear abnormality of cervix with LGSIL  History of LGSIL with colposcopy in 2011. Last pap smear on file was NIL in 2013. Referred to outside OB/Gyn in 2016 but no updated results for pap smears available. Patient is not sure when she had her last exam.  - Advised Pap smear today and patient declined. She is open to doing this on another day when she is more mentally prepared for it.     Follow-up in the next week to recheck blood pressure, discuss abdominal pain concerns further, and clean out ears prior to audiology visit. Will need another follow-up visit for pap in the near future.     Options for treatment and follow-up care were reviewed with the Lola Irina Arias  and/or guardian engaged in the decision making process and verbalized  understanding of the options discussed and agreed with the final plan.  Chyna Egan MD

## 2021-03-29 NOTE — NURSING NOTE
Medicare Wellness Visit  Health Risk Assessment           Health Risk Assessment / Review of Systems     Constitutional: Any fevers or night sweats?  YES ngiht sweats almost daily, sleeps with a/c and fan. Denies snoring     Eyes:  Vision problems    YES last eye check 2.5years ago, patient notes worsening vision, wears glasses, declined referral and stated she has an eye doctor    Hearing Do you feel you have hearing loss?   YES difficulties hearing with background noise    Cardiovascular: Any chest pain, fast or irregular heart beat, calf pain with walking?      YES left sided chest pain 2-3/week along with left sided breast pain. Describes the pain as pinching, duration depends on level of activity. Also endorses rapid heartbeat and diaphoresis during these episodes.           Respiratory:   Any breathing problems or cough?    YES patient endorses feeling winded when walking    Gastrointestinal: Any stomach or stool problems?    YES bloating and stomach pain daily, 7/10 or higher, sharp. Experiences after eating. Patient endorses history of IBS and diverticulitis     Genitourinary: Do you have difficulty controlling urination?    No    Muscles and Joints: Any joint stiffness or soreness?    YES muscle cramping in legs when sleeping/lying down, happens sometimes in the car as well. Endorses right hip pain when lying down and right knee pain when walking    Skin: Any concerning lesions or moles?   No     Nervous System: Any loss of strength or feeling, numbness or tingling, shaking, dizziness, or headache?   YES weakness/loss of strength in her hands post stroke, numbness/tingling/pins/needles/burning in feet, frequent headaches but has decreased to only 3 episodes post stroke    Mental Health: Any depression, anxiety or problems sleeping?     YES endorses depression, denies any current mental health care or medications. Endorses seeing her daughter helps with her depression, patient just also reunited with her  son, patient smiling and had excited speech while talking about her family, going to be a grandmother soon for the first time, concern about her own parents and their aging. Patient also endorses great frustration with post-stroke symptoms including speech. Patient stated it is difficult for people to understand her and she is having a difficult time adjusting to her changes    Cognition: Do you have any problems with your memory?   YES Patient endorses memory concerns, has a difficult time remembering small tasks. Often-times grabbing the wrong item. Patient told a story of going to light incense and putting it to her mouth like a cigarette instead. Patient informed writer of another time she grabbed a lit candle thinking it was her soda can. Patient endorses a lot of frustration with post-stroke symptoms and forgetfulness.            Medical Care     What other specialists or organizations are involved in your medical care?    Patient Care Team       Relationship Specialty Notifications Start End    Maldonado Darling MD PCP - General   8/18/20     Phone: [phone] Fax: [phone] [address] [facility] [address]    Maldonado Darling MD Assigned PCP   8/23/20     Phone: [phone] Fax: [phone] [address] [facility] [address]          Have you been to the ER or overnight in the hospital in the last year?   YES CVA & CHF (two separate visits)         Social History / Home Safety       Marital Status:Partnered  Who lives in your household? Self and boyfriend    Do you feel threatened or controlled by a partner, ex-partner or anyone in your life? No     Has anyone hurt you physically, for example by pushing, hitting, slapping or kicking you   or forcing you to have sex? No          Does your home have any of the following safety concerns; loose rugs in the hallway,  bathrooms with no grab bars by the tub or toilet, stairs with no handrails or poorly lit areas?  No      Do you need help with dressing yourself, bathing, eating or getting around your home?  No     Do you need help with the phone, transportation, shopping, preparing meals, housework, laundry, medications or managing money? YES boyfriend assists with medications due to patient making mistakes post stroke with medications      Risk Behaviors and Healthy Habits     History   Smoking Status     Current Every Day Smoker     Packs/day: 0.50     Types: Cigarettes   Smokeless Tobacco     Never Used     Comment: Declined CIQ, not ready to quit. smoking about 10 a day     How many servings of fruits and vegetables do you eat a day? 2/day    Exercise: Patient denies dedicated exercise but stated she walks all day every day walking      Do you frequently drive without a seatbelt? No     Do you use tobacco?   YES 0.5ppd    Do you use any other drugs? No         Do you use alcohol?Yes  Number of drinks per day -  Number of drinking days a week 3 drinks/week      Frailty Assessment            Have you lost 10 or more pounds unintentionally in the previous year? No     How difficult is walking from one room to the other on the same level?not   No     Is it difficult to lift or carry something as heavy as 10 pounds?moderately   Yes    Compared with most (men/women) your age, would you say  that you are more active, less active, or about the same? Patient stated she is unsure   No    Fall Risk Assessment: Patient denies any falls within the last year. Timed up and go 9 seconds. Even and steady gait    Advance Directives:   Discussed with patient and family as appropriate. Has patient  completed advance directives and/or a living will? no  Provided blank copy, patient wishes to complete while in clinic today. Advised physician of notary available in clinic today    Alethea Vee RN

## 2021-03-29 NOTE — Clinical Note
Odilon Garcia,   Can you reach out to Lola soon to check on her concerns regarding her prescription costs. She reported some issues with paying for her pills (I presume due to the Medicare donut hole) and she is wondering what other supports she may be eligible for.     Thanks!    Chyna Egan MD

## 2021-03-29 NOTE — PATIENT INSTRUCTIONS
- Increase your lisinopril to 15 mg daily (3 tablets).   - Follow-up in the next week to recheck your blood pressure and discuss your abdominal pain and clean out your ears.   - We'll reach out with the referral information for your various specialty visits.   - Follow-up soon for your pap smear.     PERSONAL PREVENTIVE SERVICES PLAN - SERVICES     Review these tests with your medical staff then decide which ones you want and take this page home for your reference      SCREENING TESTS     Description   Year of Last Screening   Recommended Today?   Heart disease screening blood tests    Cholesterol level Reducing cholesterol can reduce your risk of heart attacks by 25%.  Screening is recommended yearly if you are at risk of heart disease otherwise every 4-5 years 2019 Yes; Recommended and ordered.   Diabetes screening tests    Hemoglobin A1c blood test   Finding and treating diabetes early can reduce complications.  Screening recommended/covered yearly if you have high blood pressure, high cholesterol, obesity (BMI >30), or a history of high blood glucose tests; or 2 of the following: family history of diabetes, overweight (BMI >25 but <30), age 65 years or older, and a history of diabetes of pregnancy or gave birth to baby weighing more than 9 lbs. 1/31/20 Yes; Recommended and ordered.   Hepatitis B screening Finding hepatitis B early can reduce complications.  Screening is recommended for persons with selected risk factors. - Yes; Recommended and ordered.   Hepatitis C screening Finding hepatitis C early can reduce complications.  Screening is recommended for all persons born from 1945 through 1965 and for those with selected other risk factors.  - Yes; Recommended and ordered.   HIV screening Finding HIV early can reduce complications.  Screening is recommended for persons with risk factors for HIV infection. - Yes; Recommended and ordered.   Glaucoma screening Early detection of glaucoma can prevent blindness.    Please talk to your eye doctor about this.       SCREENING TESTS     Description   Year of Last Screening   Recommended Today?   Colorectal cancer screening    Fecal occult blood test     Screening colonoscopy Screening for colon cancer has been shown to reduce death from colon cancer by 25-30%. Screening recommended to start at 50 years and continuing until age 75 years.   - No: is not indicated today.   Breast Cancer Screening (women)    Mammogram Mammogram screening for breast cancer has been shown to reduce the risk of dying from breast cancer and prolong life. Screening is recommended every 1-2 years for women aged 50 to 74 years.  - No: is not indicated today.   Cervical Cancer screening (women)    Pap Cervical pap smears can reduce cervical cancer. Screening is recommended annually if high risk (history of abnormal pap smears) otherwise every 2-3 years, stop screening at 65 years of age if history of normal paps. - Yes; Recommended and ordered.   Screening for Osteoporosis:  Bone mass measurements (women)    Dexa Scan Screening and treating Osteoporosis can reduce the risk of hip and spine fractures. Screening is recommended in women 65 years or older and in women and men at risk of osteoporosis. - No: is not indicated today.   Screening for Lung Cancer     Low-dose CT scanning Screening can reduce mortality in persons aged 55-80 who have smoked at least 30 pack-years and who are either still smoking or have quit in the past 15 years. - No: is not indicated today.   Abdominal Aortic Aneurysm (AAA) screening    Ultrasound (US)   An aneurysm treated before rupture is very safe -a ruptured aneurysm can be fatal.  Screening  by US for AAA is limited to patients who meet one of the following criteria:    Men who are 65-75 years old and have smoked more than 100 cigarettes in their lifetime    Anyone with a family history of abdominal aortic aneurysm - No: is not indicated today.     Here are your recommended  immunizations.  Take this home for your reference.                                                    IMMUNIZATIONS Description Recommend today?     Influenza (Flu shot) Prevents flu; should get every year Yes; Recommended    PCV 13 Pneumonia vaccination; you get it once Yes; Recommended    PPSV 23 Second pneumonia vaccination; usually get it 1 year after PCV 13 No: is not indicated today.   Zoster (Shingles) Prevents shingles; you get it once  (Check with Part D insurance for coverage, must receive at a pharmacy, not clinic) Yes; Recommended    Tetanus Prevents tetanus; once every 10 years Due June of this year     Hepatitis B  If you have any of the following risk factors you should be immunized for hepatitis B: severe kidney disease, people who live in the same house as a carrier of Hepatitis B virus, people who live in  institutions (e.g. nursing homes or group homes), homosexual men, patients with hemophilia who received Factor VIII or IX concentrates, abusers of illicit injectable drugs Await lab result to check immunity      PATIENT INSTRUCTIONS    Yearly exam:     See your health care provider every year in order to review changes in your health, review medicines that you take, and discuss preventive care needs such as immunizations and cancer screening.    Get a flu shot each year.     Advance Directives:    If you have not done so, you are encouraged to complete advance directives and/or a living will.   More information about advance directives can be found at: http://www.[place]med.org/advocacy/Key-Issues/Advance-Directives    Nutrition:     Eat at least 5 servings of fruits and vegetables each day.     Eat whole-grain bread, whole-wheat pasta and brown rice instead of white grains and rice.     Talk to your doctor about Calcium and Vitamin D.     Lifestyle:    Exercise for at least 150 minutes a week (30 minutes a day, 5 days a week). This will help you control your weight and prevent disease.     Limit  alcohol to one drink per day.     If you smoke, try to quit - your doctor will be happy to help.     Wear sunscreen to prevent skin cancer.     See your dentist every six months for an exam and cleaning.     See your eye doctor every 1 to 2 years to screen for conditions such as glaucoma, macular degeneration and cataracts.

## 2021-03-30 ENCOUNTER — TELEPHONE (OUTPATIENT)
Dept: FAMILY MEDICINE | Facility: CLINIC | Age: 51
End: 2021-03-30

## 2021-03-30 LAB
HBV SURFACE AG SERPL QL IA: NEGATIVE
HCV AB SER QL: NEGATIVE

## 2021-04-01 ENCOUNTER — TELEPHONE (OUTPATIENT)
Dept: FAMILY MEDICINE | Facility: CLINIC | Age: 51
End: 2021-04-01

## 2021-04-01 NOTE — TELEPHONE ENCOUNTER
I got a message from  about the pt. It seems like the pt is having some issue with paying for medication, since she only has Medicare. I called to see if I can help the pt find other options. I called the pt three days, at three different times to help. I could not get a hold of the pt. I left a message with the pt boyfriend to give me a call back, but never heard from her.

## 2021-04-02 ENCOUNTER — TELEPHONE (OUTPATIENT)
Dept: FAMILY MEDICINE | Facility: CLINIC | Age: 51
End: 2021-04-02

## 2021-04-02 LAB — HBV SURFACE AB SER-ACNC: NEGATIVE M[IU]/ML

## 2021-04-02 NOTE — LETTER
April 15, 2021       TO: Lola Irina Arias  [address]  [place] [address]         Dear Lola,    My apologies we have not been able to connect with one another to review the mental health referral. Please review and contact the clinic at your earliest convienence to discuss where you would like your referral sent.     [facility]  [address], [place] [address]  [phone] Phone  [phone] Fax  Hours: M-F 7:30-5:30pm     [facility] ([facility] Office  [address]  [place] [address]  [phone] (for appointments)  Fax: [phone]  7:30 am - 5 pm M-F, appointments available on Saturdays     [facility] ([facility]) [address]  [address]  [place] [address]  [phone] [place]  [phone] Fax  Hours:  Monday - Friday 8:30 - 5:00pm  8:00am - 5:00pm Saturday       [facility]  [address] [facility] [address]  [place] [address]  [phone] Phone  [phone] Fax  M-F 7:30AM-7PM  Saturday 8AM-2PM     [facility] ([facility])  [address] [place] [address] [address],   [place], [place] [address] [phone] Phone  [phone] Fax  M-Th: 8:30-5pm  F: 8:30-4:30pm        [facility]  [address] [place] [address]  [phone] Phone  [phone] Fax  M-Th 8-8pm  F 8-4:30pm        Sincerely,      Ebonie Armstrong CMA, Care Coordinator

## 2021-04-08 ENCOUNTER — DOCUMENTATION ONLY (OUTPATIENT)
Dept: PSYCHOLOGY | Facility: CLINIC | Age: 51
End: 2021-04-08

## 2021-04-08 NOTE — PROGRESS NOTES
Patient with depressive symptoms, chart indicates bipolar disorder. Patient has had a stroke and also struggles with memory concerns related to this. She can be seen at any of the following:    [facility]  [address]  [address]  [place], [place] [address]  [phone] Phone  [phone] Fax  Hours: M-F 7:30-5:30pm    [facility] ([facility] Office  [address] [facility] [address] [facility] [address]  [place] [address]  [phone] (for appointments)  Fax: [phone]  7:30 am - 5 pm M-F, appointments available on     [facility] ([facility]) [address]  [address]  [place] [address]  [phone] [place]  [phone] Fax  Hours:  Monday - Friday 8:30 - 5:00pm  8:00am - 5:00pm Saturday    [facility]  [address] [facility] [address]  [place] [address]  [phone] Phone  [phone] Fax  M-F 7:30AM-7PM  Saturday 8AM-2PM    [facility] ([facility])  [address] [place] [address] [address],   [place] [address]  [phone] Phone  [phone] Fax  M-Th: 8:30-5pm  F: 8:30-4:30pm      [facility] [address] [address]  [phone] Phone  [phone] Fax  M-Th 8-8pm  F 8-4:30pm        Patient Demographics    Patient Name   Sander Arias MRN   [id number] Sex   Female    1970 N   [id number] Address   [address] [place] [address] Phone   [phone] (Home)   [phone] (Mobile) *Preferred*   Primary Visit Coverage    Payer Plan Sponsor Code Group Number Group Name Payer Address Payer Phone   MEDICARE [facility]ICARE [facility]   ATTN CLAIMS [phone]        [address]         [place] [address]    Primary Visit Coverage Subscriber    Subscriber ID Subscriber Name Subscriber SSN Subscriber Address   [id number] SANDER ARIAS [id number] [place] [address]   Order  Information    Date Department Ordering/Authorizing   3/29/2021 [facility] [facility] Chyna Egan MD   Order Providers    Authorizing Provider Encounter Provider   Chyna Egan MD Wilhelm, April Katherine, MD   Associated Diagnoses    Depression, unspecified depression type       Comments     needed: No   Language: English          Order Questions    Question Answer Comment   Reason for Referral: Depression    Currently receiving mental health services (if 'Yes', use free lisbet box - what services and why today's referral?) No    Previous psych hospitalization: Unknown

## 2021-04-15 NOTE — TELEPHONE ENCOUNTER
"2nd Out going call made to follow up mental health referral. 'Voicemail box is full and cannot accept any new messages\". Letter with referral information mailed to home address listed in chart.   "

## 2021-05-06 ENCOUNTER — OFFICE VISIT (OUTPATIENT)
Dept: FAMILY MEDICINE | Facility: CLINIC | Age: 51
End: 2021-05-06
Payer: MEDICARE

## 2021-05-06 VITALS
BODY MASS INDEX: 29.24 KG/M2 | TEMPERATURE: 97.4 F | HEART RATE: 102 BPM | SYSTOLIC BLOOD PRESSURE: 151 MMHG | OXYGEN SATURATION: 97 % | DIASTOLIC BLOOD PRESSURE: 103 MMHG | WEIGHT: 173 LBS

## 2021-05-06 DIAGNOSIS — G89.29 CHRONIC BILATERAL LOW BACK PAIN WITHOUT SCIATICA: ICD-10-CM

## 2021-05-06 DIAGNOSIS — M54.50 CHRONIC BILATERAL LOW BACK PAIN WITHOUT SCIATICA: ICD-10-CM

## 2021-05-06 DIAGNOSIS — R51.9 ACUTE INTRACTABLE HEADACHE, UNSPECIFIED HEADACHE TYPE: Primary | ICD-10-CM

## 2021-05-06 DIAGNOSIS — K21.9 GASTROESOPHAGEAL REFLUX DISEASE WITHOUT ESOPHAGITIS: ICD-10-CM

## 2021-05-06 PROCEDURE — 99213 OFFICE O/P EST LOW 20 MIN: CPT | Mod: GC | Performed by: STUDENT IN AN ORGANIZED HEALTH CARE EDUCATION/TRAINING PROGRAM

## 2021-05-06 RX ORDER — CYCLOBENZAPRINE HCL 5 MG
5-10 TABLET ORAL 2 TIMES DAILY PRN
Qty: 30 TABLET | Refills: 0 | Status: SHIPPED | OUTPATIENT
Start: 2021-05-06 | End: 2021-08-09

## 2021-05-06 ASSESSMENT — PAIN SCALES - GENERAL: PAINLEVEL: EXTREME PAIN (9)

## 2021-05-06 NOTE — PROGRESS NOTES
HPI:       Lola Arias is a 50 year old  female with PMH significant for HTN, COPD, CHF, CVA, bipolar disorder, hx of diabetes, alcohol use disorder, and back pain after x2 lumbar back surgeries who presents for head pain and back pain after a fall on Saturday.     Tylenol 4g daily. Hasn't been getting any better per patient.  Been getting muscle spasms in back more since the fall.  Has staples in back of her scalp, present for 5 days now, still very sensitive around that area.  Blacked out during the fall, and 'doesn't remember anything until she got to the hospital;' however, daughter says she was talking to her fine during that time.  Has been lying in bed excessively since fall because of back/head pain.   Endorses photophobia.   Sleep has been poor because of the pain.  Really wants X-rays but was told no indications when she was seen at [facility] ED.         PMHX:     Patient Active Problem List   Diagnosis     Chronic back pain     Alcohol dependence in remission (H)     Diabetes mellitus, type 2 (H)     Bipolar disorder (H)     Depression with anxiety     H/O tubal ligation     Hypothyroidism     GERD (gastroesophageal reflux disease)     Tobacco dependence syndrome     Irritable bowel syndrome     Displacement of lumbar intervertebral disc     Peptic ulcer     Depressed mood     Obesity     Essential hypertension     Pap smear abnormality of cervix with LGSIL     Lumbar degenerative disc disease     Chronic systolic congestive heart failure (H)     Nephrolithiasis     Nondependent cocaine abuse, episodic (H)       Current Outpatient Medications   Medication Sig Dispense Refill     albuterol (PROAIR HFA/PROVENTIL HFA/VENTOLIN HFA) 108 (90 Base) MCG/ACT inhaler Inhale 2 puffs into the lungs every 6 hours as needed for shortness of breath / dyspnea or wheezing 1 Inhaler 1     aspirin 81 MG chewable tablet Take 1 tablet (81 mg) by mouth daily 108 tablet 3     carvedilol (COREG) 3.125 MG tablet Take 2  tablets (6.25 mg) by mouth 2 times daily (with meals) 120 tablet 3     carvedilol (COREG) 6.25 MG tablet Take 6.25 mg by mouth       cyclobenzaprine (FLEXERIL) 5 MG tablet Take 1-2 tablets (5-10 mg) by mouth 2 times daily as needed for muscle spasms 30 tablet 0     furosemide (LASIX) 40 MG tablet Take 1 tablet (40 mg) by mouth daily 60 tablet 1     lisinopril (ZESTRIL) 5 MG tablet Take 3 tablets (15 mg) by mouth daily 90 tablet 1     metFORMIN (GLUCOPHAGE-XR) 500 MG 24 hr tablet Take 1 tablet (500 mg) by mouth daily (with dinner) 60 tablet 1     nitroGLYcerin (NITROSTAT) 0.4 MG sublingual tablet Place 1 tablet (0.4 mg) under the tongue every 5 minutes as needed for chest pain If you are still having symptoms after 3 doses (15 minutes) call 911. 25 tablet 3     omeprazole (PRILOSEC) 20 MG DR capsule Take 1 capsule (20 mg) by mouth daily 30 capsule 11     STATIN NOT PRESCRIBED, INTENTIONAL, Statin not prescribed intentionally due to Other LDL wnl, ASCVD <7.5% (This option does not exclude patient from measure) 0 each 0     tiZANidine (ZANAFLEX) 2 MG tablet Take 1-2 tablets (2-4 mg) by mouth 3 times daily as needed for muscle spasms 90 tablet 0       Social History     Tobacco Use     Smoking status: Current Every Day Smoker     Packs/day: 0.50     Types: Cigarettes     Smokeless tobacco: Never Used     Tobacco comment: Declined CIQ, not ready to quit. smoking about 10 a day   Substance Use Topics     Alcohol use: No     Alcohol/week: 0.0 standard drinks     Drug use: No          Allergies   Allergen Reactions     Nkda [No Known Drug Allergies]        No results found for this or any previous visit (from the past 24 hour(s)).         Review of Systems:     Constitutional:  No Fatigue,   Cardiovascular:  No Chest Pain  Resp: Breathing comfortably  Gastrointestinal:  Some nausea  Neuro:  Endorses some imbalance. C/O head pain in back of head. No new weakness or dizziness            Physical Exam:     There were no vitals  filed for this visit.  There is no height or weight on file to calculate BMI.    General: Awake, alert and oriented. No acute distress.   HEENT: The posterior scalp has a well healing 1 1/2 inch laceration in midline, staples in place. No other lacerations seen. Able to fully rotate and extend, flex head.   Respiratory: No respiratory distress.  Abdominal: Abdomen is soft and non-tender without distention. No hepatomegaly.  MSK: No major joint swelling. No spinal tenderness or paraspinal tenderness. No gross deformities of back or neck.  Skin: Refer to HEENT  Neurological: The patient is awake, alert and oriented to place and time. Motor function is grossly normal without any weakness. Sensation is intact bilaterally throughout. EOMI with PERRL.  Psychiatric: Poor insight      Assessment and Plan     (R51.9) Acute intractable headache, unspecified headache type  (primary encounter diagnosis)  Comment: Pain 2/2 whacking head when intoxicated and requiring stitches. No eden deformity or tenderness elsewhere. Patient asking for back x-rays but does not warrant X-rays.  Plan:   -Return in 5 days to get scalp staples out as should be fully healed by then  -Recommending continue taking tylenol for pain and using warm compresses and ambulating more/light physical activities    (M54.5,  G89.29) Chronic bilateral low back pain without sciatica  Comment: chronic, needed refill as have had symptoms of spasms  Plan: cyclobenzaprine (FLEXERIL) 5 MG tablet           (K21.9) Gastroesophageal reflux disease without esophagitis  Comment: chronic, needed refill as have had symptoms  Plan: omeprazole (PRILOSEC) 20 MG DR capsule      Options for treatment and follow-up care were reviewed with the patient and/or guardian. Lola Arias and/or guardian engaged in the decision making process and verbalized understanding of the options discussed and agreed with the final plan.    Oscar Lobato MD      Precepted today with: Hever  MD Lucas

## 2021-05-06 NOTE — PROGRESS NOTES
Preceptor Attestation:   Patient seen, evaluated and discussed with the resident. I was present for and supervised the entire procedure. I have verified the content of the note, which accurately reflects my assessment of the patient and the plan of care.  Supervising Physician:Hever Zavala MD  [facility]

## 2021-05-12 ENCOUNTER — OFFICE VISIT (OUTPATIENT)
Dept: FAMILY MEDICINE | Facility: CLINIC | Age: 51
End: 2021-05-12
Payer: MEDICARE

## 2021-05-12 VITALS
TEMPERATURE: 97.8 F | RESPIRATION RATE: 18 BRPM | HEART RATE: 102 BPM | SYSTOLIC BLOOD PRESSURE: 139 MMHG | BODY MASS INDEX: 28.73 KG/M2 | DIASTOLIC BLOOD PRESSURE: 85 MMHG | OXYGEN SATURATION: 100 % | WEIGHT: 170 LBS

## 2021-05-12 DIAGNOSIS — S01.01XD OCCIPITAL SCALP LACERATION, SUBSEQUENT ENCOUNTER: Primary | ICD-10-CM

## 2021-05-12 PROCEDURE — 99212 OFFICE O/P EST SF 10 MIN: CPT | Performed by: FAMILY MEDICINE

## 2021-05-12 ASSESSMENT — ENCOUNTER SYMPTOMS: CONSTITUTIONAL NEGATIVE: 1

## 2021-05-12 NOTE — PROGRESS NOTES
Assessment and Plan     1. Occipital scalp laceration, subsequent encounter  Two staples removed without issue. Return to clinic as needed.  - Suture Removal Only, charge    Options for treatment and follow-up care were reviewed with the patient and/or guardian. Lola Arias and/or guardian engaged in the decision making process and verbalized understanding of the options discussed and agreed with the final plan. I answered all of their questions.    Claudio Crockett III, MD, FAAFP  [facility]  05/12/21 9:51 AM           HPI:       Lola Arias is a 50 year old  female  Patient presents with:  Suture Removal: head staples  Medication Reconciliation    Here to get her staples removed. They were placed in the ER on 2 May 21 after a fall while intoxicated. Has not been drinking since. Has not showered since. Is feeling good. Wants to bathe. No bleeding or pain.         PMHX:     Patient Active Problem List   Diagnosis     Chronic back pain     Alcohol dependence in remission (H)     Diabetes mellitus, type 2 (H)     Bipolar disorder (H)     Depression with anxiety     H/O tubal ligation     Hypothyroidism     GERD (gastroesophageal reflux disease)     Tobacco dependence syndrome     Irritable bowel syndrome     Displacement of lumbar intervertebral disc     Peptic ulcer     Depressed mood     Obesity     Essential hypertension     Pap smear abnormality of cervix with LGSIL     Lumbar degenerative disc disease     Chronic systolic congestive heart failure (H)     Nephrolithiasis     Nondependent cocaine abuse, episodic (H)       Current Outpatient Medications   Medication Sig Dispense Refill     albuterol (PROAIR HFA/PROVENTIL HFA/VENTOLIN HFA) 108 (90 Base) MCG/ACT inhaler Inhale 2 puffs into the lungs every 6 hours as needed for shortness of breath / dyspnea or wheezing 1 Inhaler 1     aspirin 81 MG chewable tablet Take 1 tablet (81 mg) by mouth daily 108 tablet 3     carvedilol  (COREG) 3.125 MG tablet Take 2 tablets (6.25 mg) by mouth 2 times daily (with meals) 120 tablet 3     carvedilol (COREG) 6.25 MG tablet Take 6.25 mg by mouth       cyclobenzaprine (FLEXERIL) 5 MG tablet Take 1-2 tablets (5-10 mg) by mouth 2 times daily as needed for muscle spasms 30 tablet 0     furosemide (LASIX) 40 MG tablet Take 1 tablet (40 mg) by mouth daily 60 tablet 1     lisinopril (ZESTRIL) 5 MG tablet Take 3 tablets (15 mg) by mouth daily 90 tablet 1     metFORMIN (GLUCOPHAGE-XR) 500 MG 24 hr tablet Take 1 tablet (500 mg) by mouth daily (with dinner) 60 tablet 1     nitroGLYcerin (NITROSTAT) 0.4 MG sublingual tablet Place 1 tablet (0.4 mg) under the tongue every 5 minutes as needed for chest pain If you are still having symptoms after 3 doses (15 minutes) call 911. 25 tablet 3     omeprazole (PRILOSEC) 20 MG DR capsule Take 1 capsule (20 mg) by mouth daily 30 capsule 11     STATIN NOT PRESCRIBED, INTENTIONAL, Statin not prescribed intentionally due to Other LDL wnl, ASCVD <7.5% (This option does not exclude patient from measure) (Patient not taking: Reported on 5/6/2021) 0 each 0       Social History     Socioeconomic History     Marital status: Single     Spouse name: Not on file     Number of children: Not on file     Years of education: Not on file     Highest education level: Not on file   Occupational History     Occupation: Employed   Social Needs     Financial resource strain: Not on file     Food insecurity     Worry: Not on file     Inability: Not on file     Transportation needs     Medical: Not on file     Non-medical: Not on file   Tobacco Use     Smoking status: Current Every Day Smoker     Packs/day: 0.50     Types: Cigarettes     Smokeless tobacco: Never Used     Tobacco comment: Declined CIQ, not ready to quit. smoking about 10 a day   Substance and Sexual Activity     Alcohol use: No     Alcohol/week: 0.0 standard drinks     Drug use: No     Sexual activity: Not on file   Lifestyle      Physical activity     Days per week: Not on file     Minutes per session: Not on file     Stress: Not on file   Relationships     Social connections     Talks on phone: Not on file     Gets together: Not on file     Attends Christianity service: Not on file     Active member of club or organization: Not on file     Attends meetings of clubs or organizations: Not on file     Relationship status: Not on file     Intimate partner violence     Fear of current or ex partner: Not on file     Emotionally abused: Not on file     Physically abused: Not on file     Forced sexual activity: Not on file   Other Topics Concern     Parent/sibling w/ CABG, MI or angioplasty before 65F 55M? Not Asked   Social History Narrative    Education: 12th    Child: Philomena 16       Allergies   Allergen Reactions     Nkda [No Known Drug Allergies]        No results found for this or any previous visit (from the past 24 hour(s)).         Review of Systems:     Review of Systems   Constitutional: Negative.             Physical Exam:     Vitals:    05/12/21 0931   BP: 139/85   BP Location: Right arm   Patient Position: Sitting   Cuff Size: Adult Regular   Pulse: 102   Resp: 18   Temp: 97.8  F (36.6  C)   SpO2: 100%   Weight: 77.1 kg (170 lb)     Body mass index is 28.73 kg/m .    Physical Exam  Vitals signs and nursing note reviewed.   Constitutional:       General: She is not in acute distress.     Appearance: Normal appearance. She is not ill-appearing, toxic-appearing or diaphoretic.   HENT:      Head:     Pulmonary:      Effort: No respiratory distress.   Neurological:      Mental Status: She is alert and oriented to person, place, and time.

## 2021-05-12 NOTE — NURSING NOTE
5/11/2021 [facility] Previsit Plan   Staple removal- seen dr doll last week     DUE FOR:   DM Foot exam: schedule  MAMMO SCREENING: get order  Pneumococcal Vaccine: declined  COVID-19 Vaccine: declined  HEPATITIS B IMMUNIZATION   PAP due   EYE EXAM     Tia Vines

## 2021-05-13 ENCOUNTER — COMMUNICATION - HEALTHEAST (OUTPATIENT)
Dept: CARDIOLOGY | Facility: CLINIC | Age: 51
End: 2021-05-13

## 2021-05-24 ENCOUNTER — RECORDS - HEALTHEAST (OUTPATIENT)
Dept: ADMINISTRATIVE | Facility: CLINIC | Age: 51
End: 2021-05-24

## 2021-05-25 ENCOUNTER — RECORDS - HEALTHEAST (OUTPATIENT)
Dept: ADMINISTRATIVE | Facility: CLINIC | Age: 51
End: 2021-05-25

## 2021-05-28 ENCOUNTER — RECORDS - HEALTHEAST (OUTPATIENT)
Dept: ADMINISTRATIVE | Facility: CLINIC | Age: 51
End: 2021-05-28

## 2021-05-30 ENCOUNTER — RECORDS - HEALTHEAST (OUTPATIENT)
Dept: ADMINISTRATIVE | Facility: CLINIC | Age: 51
End: 2021-05-30

## 2021-05-31 ENCOUNTER — RECORDS - HEALTHEAST (OUTPATIENT)
Dept: ADMINISTRATIVE | Facility: CLINIC | Age: 51
End: 2021-05-31

## 2021-06-01 ENCOUNTER — RECORDS - HEALTHEAST (OUTPATIENT)
Dept: ADMINISTRATIVE | Facility: CLINIC | Age: 51
End: 2021-06-01

## 2021-06-02 ENCOUNTER — RECORDS - HEALTHEAST (OUTPATIENT)
Dept: ADMINISTRATIVE | Facility: CLINIC | Age: 51
End: 2021-06-02

## 2021-06-03 VITALS — BODY MASS INDEX: 30.62 KG/M2 | WEIGHT: 166.4 LBS | HEIGHT: 62 IN

## 2021-06-04 VITALS
SYSTOLIC BLOOD PRESSURE: 122 MMHG | OXYGEN SATURATION: 99 % | RESPIRATION RATE: 20 BRPM | BODY MASS INDEX: 30.71 KG/M2 | DIASTOLIC BLOOD PRESSURE: 88 MMHG | WEIGHT: 169 LBS | HEART RATE: 76 BPM

## 2021-06-05 VITALS
RESPIRATION RATE: 24 BRPM | WEIGHT: 167.4 LBS | HEART RATE: 64 BPM | DIASTOLIC BLOOD PRESSURE: 70 MMHG | SYSTOLIC BLOOD PRESSURE: 108 MMHG | HEIGHT: 62 IN | BODY MASS INDEX: 30.8 KG/M2

## 2021-06-05 VITALS — BODY MASS INDEX: 31.1 KG/M2 | HEIGHT: 62 IN | WEIGHT: 169 LBS

## 2021-06-05 NOTE — TELEPHONE ENCOUNTER
"Pt reports \"discharged from hospital and never fully recovered\". Pt reports hard time staying awake for long, sweating and nausea. No thermometer. Pt reports she has been taking Tylenol but still sweating for past two days. Pt reports she has some chest pain when bending over as well as mild wheezing and shortness of breath, new since discharge per pt. Pt reports she has been unable to afford aftercare.     Advised pt to have another adult drive her to the ER. Writer gave pt phone numbers for sliding scale clinics.     Pt verbalizes understanding and agrees to plan.    Reason for Disposition    Difficulty breathing    Protocols used: WEAKNESS (GENERALIZED) AND FATIGUE-A-AH      "

## 2021-06-11 NOTE — PROGRESS NOTES
Consultation - [facility]  Lola Arias,  1970, MRN [id number]    PCP: Hever Zavala MD, [phone]    Assessment and Plan: Chronic congestive heart failure with acute exacerbation nonischemic type.  Now with improved compensation following diuresis.  Normal coronary angiogram   Recommendations: At present would not seem to require aspirin therapy.  Continue with current medical therapy.  She has been taking Coreg 1 tablet daily we will advance to twice daily of the 3.125 mg dose.  In 1 week we will advance to 6.25 mg twice a day.  Continue with lisinopril at current dose.  Continue with the Lasix at 40 once a day.  Check electrolytes today.  Check echo.  Follow-up in CHF clinic.  Follow-up again with me in 2 to 3 months    Chief Complaint: Dyspnea now improved    HPI:  We have been requested by primary care not identified sent from the ER likely Dr. Zavala to evaluate Lola Arias for consultation who is a  50 y.o. year old female for above chief complaint.  Hx: New patient consultation  50-year-old woman who was seen in the emergency room on  because of shortness of breath.  Apparently has a history of chronic congestive heart failure.  Patient went CT scan for PE which was negative.  She was not hypoxic.  Give a history of COPD diabetes chronic renal failure.  Symptoms have been developing over 1 week and noted a 22 pound weight gain.  During her chart I seen 2019 as she is seen by Dr. Yadi Vu brought to the Cath Lab for a evaluation for acute ST SONU.  She had prolonged chest heaviness and dyspnea.  Troponin at the time was normal.  Coronary angiography revealed normal vessels with left dominant system.  She has had multiple ER evaluations for dyspnea.  She is felt at times to have had heart failure or acute bronchospasm.  She is a chronic tobacco smoker.  Recent laboratory showed normal chemistries normal renal function mild transaminase elevation.  Normal  troponin elevated BNP at 1842 elevated d-dimer 2.25  EKG showed normal sinus rhythm suggestion of LVH no pathologic Q waves STs are normal  Echo from last January showed ejection fraction 24% with left ventricular enlargement.  Moderate severe TR mild/moderate MR    Current Outpatient Medications:      albuterol (PROAIR HFA;PROVENTIL HFA;VENTOLIN HFA) 90 mcg/actuation inhaler, Inhale 2 puffs every 4 (four) hours as needed for wheezing., Disp: 1 Inhaler, Rfl: 0     aspirin 81 mg chewable tablet, Chew 1 tablet (81 mg total) daily., Disp: 30 tablet, Rfl: 0     carvediloL (COREG) 3.125 MG tablet, Take 1 tablet (3.125 mg total) by mouth 2 (two) times a day., Disp: 60 tablet, Rfl: 0     furosemide (LASIX) 40 MG tablet, Take 1 tablet (40 mg total) by mouth 2 (two) times a day at 9am and 6pm., Disp: 10 tablet, Rfl: 0     lisinopriL (PRINIVIL,ZESTRIL) 5 MG tablet, Take 1 tablet (5 mg total) by mouth daily., Disp: 30 tablet, Rfl: 0     metFORMIN (GLUCOPHAGE-XR) 500 MG 24 hr tablet, Take 1 tablet (500 mg total) by mouth daily with breakfast., Disp: 60 tablet, Rfl: 0     albuterol (PROVENTIL) 2.5 mg /3 mL (0.083 %) nebulizer solution, Take 3 mL (2.5 mg total) by nebulization every 6 (six) hours as needed for wheezing., Disp: 75 mL, Rfl: 0     cetirizine (ZYRTEC) 10 MG tablet, Take 1 tablet (10 mg total) by mouth daily. (Patient taking differently: Take 10 mg by mouth daily as needed. ), Disp: 30 tablet, Rfl: 0  Medical History  Active Ambulatory (Non-Hospital) Problems    Diagnosis     Depression     Periumbilical abdominal pain     Acute on chronic systolic heart failure (H)     Heart failure (H)     Acute on chronic systolic CHF (congestive heart failure) (H)     Acute systolic heart failure (H)     Chest pain     Congestive heart failure (H)     Essential hypertension     Bipolar 1 disorder (H)     Situational anxiety     Dyspnea     Obesity     SOB (shortness of breath)     Shortness of breath     Adjustment disorder with  depressed mood     Hypothyroidism     Lumbar Radiculopathy     Acute bronchitis     Type 2 Diabetes Mellitus     Obesity     Anxiety     Nephrolithiasis     Cervicalgia     Cramp Of Limb     Acute Upper Respiratory Infection     Impaired Fasting Glucose     Abnormal Pap Smear Of Cervix     Irritable bowel syndrome     Peptic ulcer     Tobacco dependence syndrome     H/O tubal ligation     Depression with anxiety     Alcohol dependence in remission (H)     Lumbar post-laminectomy syndrome     Diabetes mellitus, type 2 (H)     Displacement of lumbar intervertebral disc     Lumbar disc herniation with radiculopathy     LSIL (low grade squamous intraepithelial lesion) on Pap smear     Chronic back pain     GERD (gastroesophageal reflux disease)     Anxiety     Bipolar 2 disorder (H)     Pedal edema     Nondependent cocaine abuse, episodic (H)     Tobacco use disorder     Past Medical History:   Diagnosis Date     Acute bronchitis      Acute upper respiratory infections of unspecified site      Anxiety state, unspecified      Calculus of kidney      Cervicalgia      Cramp of limb      Diabetes mellitus (H)      Impaired fasting glucose      Kidney stone      Myocardial infarction (H)      Obesity, unspecified      Other abnormal Papanicolaou smear of cervix and cervical HPV(795.09)      Thoracic or lumbosacral neuritis or radiculitis, unspecified      Unspecified hypothyroidism        Surgical History  She  has a past surgical history that includes Kidney stone surgery; Cholecystectomy; Back surgery; Tonsillectomy; Coronary Angiogram (N/A, 9/5/2019); and Left Heart Catheterization Without Left Ventriculogram (Left, 9/5/2019).    Social History  Reviewed, and she  reports that she has been smoking cigarettes. She has been smoking about 0.50 packs per day. She has never used smokeless tobacco. She reports that she does not drink alcohol or use drugs.  Smoking status reviewed.  Social history othrwise not contributory to  HPI.  Allergies  No Known Allergies    Family History  Reviewed, and family history includes Cancer in her mother; Diabetes in her maternal grandfather; Heart disease in her mother; Hypertension in her father; Urolithiasis in her paternal uncle and paternal uncle.  Extended Emergency Contact Information  Primary Emergency Contact: Lazaro Arias   [facility]  Home Phone: [phone]  Mobile Phone: [phone]  Relation: Uncle  Secondary Emergency Contact: Philomena Arias  Address: [address] [place] [id number] [place]  Mobile Phone: [phone]  Relation: Child  Family history otherwise negative or not conributory to HPI.    Psychosocial Needs  Social History     Social History Narrative     Not on file     Additional psychosocial needs reviewed per nursing assessment.    Prior to Admission Medications  (Not in a hospital admission)      Review of Systems:  Pertinent items are noted in HPI.  A 12 point comprehensive review of systems was negative except as noted.  Review of systems is negative except for HPI  Physical Exam:  Vitals:    09/11/20 1320   BP: 122/88   Pulse: 76   Resp: 20   SpO2: 99%     Head and neck without focal cranial neurologic defects.  JVD not distended.  Carotid upstroke normal without bruit.  External eye exam normal without icterus.  External ear exam normal.  Neck without cervical lymphadenopathy or thyromegaly.  Cardiac: S1-S2 distinct regular without extra sounds or S3-S4  Lungs: Clear no rales wheezes heard no peripheral edema identified  Abdomen with normal bowel tones.  Skin without rash, ecchymosis, lesions.  Neuromuscular tone normal.  Peripheral pulse intact and equal.  Joints without swelling or erythema.    Pertinent Labs  Lab Results: personally reviewed.   Admission on 09/08/2020, Discharged on 09/08/2020   Component Date Value     Sodium 09/08/2020 135*     Potassium 09/08/2020 4.7      Chloride 09/08/2020 105      CO2  09/08/2020 20*     Anion Gap, Calculation 09/08/2020 10      Glucose 09/08/2020 210*     BUN 09/08/2020 16      Creatinine 09/08/2020 0.84      GFR MDRD Af Amer 09/08/2020 >60      GFR MDRD Non Af Amer 09/08/2020 >60      Bilirubin, Total 09/08/2020 2.8*     Calcium 09/08/2020 9.5      Protein, Total 09/08/2020 7.3      Albumin 09/08/2020 3.5      Alkaline Phosphatase 09/08/2020 97      AST 09/08/2020 72*     ALT 09/08/2020 66*     Troponin I 09/08/2020 0.09      BNP 09/08/2020 1,842*     Color, UA 09/08/2020 Yellow      Clarity, UA 09/08/2020 Clear      Glucose, UA 09/08/2020 Negative      Bilirubin, UA 09/08/2020 Negative      Ketones, UA 09/08/2020 Negative      Specific Gravity, UA 09/08/2020 1.011      Blood, UA 09/08/2020 Negative      pH, UA 09/08/2020 6.5      Protein, UA 09/08/2020 300 mg/dL*     Urobilinogen, UA 09/08/2020 4.0 E.U./dL*     Nitrite, UA 09/08/2020 Negative      Leukocytes, UA 09/08/2020 Negative      Bacteria, UA 09/08/2020 Few*     RBC, UA 09/08/2020 0-2      WBC, UA 09/08/2020 0-5      Squam Epithel, UA 09/08/2020 25-50*     WBC 09/08/2020 8.6      RBC 09/08/2020 4.13      Hemoglobin 09/08/2020 13.0      Hematocrit 09/08/2020 40.4      MCV 09/08/2020 98      MCH 09/08/2020 31.5      MCHC 09/08/2020 32.2      RDW 09/08/2020 19.4*     Platelets 09/08/2020 197      MPV 09/08/2020 11.8      Neutrophils % 09/08/2020 50      Lymphocytes % 09/08/2020 40      Monocytes % 09/08/2020 8      Eosinophils % 09/08/2020 2      Basophils % 09/08/2020 1      Immature Granulocyte % 09/08/2020 0      Neutrophils Absolute 09/08/2020 4.3      Lymphocytes Absolute 09/08/2020 3.4      Monocytes Absolute 09/08/2020 0.6      Eosinophils Absolute 09/08/2020 0.2      Basophils Absolute 09/08/2020 0.1      Immature Granulocyte Abs* 09/08/2020 0.0      VENTRICULAR RATE 09/08/2020 87      ATRIAL RATE 09/08/2020 87      P-R INTERVAL 09/08/2020 194      QRS DURATION 09/08/2020 94      Q-T INTERVAL 09/08/2020 386      QTC  CALCULATION (BEZET) 09/08/2020 464      P Blanchard 09/08/2020 56      R AXIS 09/08/2020 -50      T AXIS 09/08/2020 50      MUSE DIAGNOSIS 09/08/2020                      Value:Normal sinus rhythm  Biatrial enlargement  Left anterior fascicular block  Left ventricular hypertrophy  Nonspecific T wave abnormality  Prolonged QT  Abnormal ECG  When compared with ECG of 17-MAY-2020 02:34,  No significant change was found  Confirmed by SEE ED PROVIDER NOTE FOR, ECG INTERPRETATION (4000),  FANI JOVEL ([id number]) on 9/8/2020 5:55:17 PM       D-Dimer, Quant 09/08/2020 2.25*     COVID-19 VIRUS SPECIMEN * 09/08/2020 Nasopharyngeal      2019-nCOV 09/08/2020 Test received-See reflex to IDDL test SARS CoV2 (COVID-19) Virus RT-PCR      SARS-CoV-2 Virus Specime* 09/08/2020 Nasopharyngeal      SARS-CoV-2 PCR Result 09/08/2020 NEGATIVE      SARS-COV-2 PCR COMMENT 09/08/2020 Testing was performed using the [employer] SARS-CoV-2 Assay on the [employer]    Admission on 05/17/2020, Discharged on 05/17/2020   Component Date Value     SYSTOLIC BLOOD PRESSURE 05/17/2020 165      DIASTOLIC BLOOD PRESSURE 05/17/2020 104      VENTRICULAR RATE 05/17/2020 98      ATRIAL RATE 05/17/2020 98      P-R INTERVAL 05/17/2020 184      QRS DURATION 05/17/2020 98      Q-T INTERVAL 05/17/2020 362      QTC CALCULATION (BEZET) 05/17/2020 462      P Axis 05/17/2020 48      R AXIS 05/17/2020 -51      T AXIS 05/17/2020 55      MUSE DIAGNOSIS 05/17/2020                      Value:Normal sinus rhythm  Possible Left atrial enlargement  Left anterior fascicular block  Left ventricular hypertrophy  Cannot rule out Septal infarct , age undetermined  Abnormal ECG  When compared with ECG of 14-FEB-2020 22:24,  ST no longer depressed in Inferior leads  Confirmed by SEE ED PROVIDER NOTE FOR, ECG INTERPRETATION (4000),  LATOYA DENISE ([id number]) on 5/17/2020 3:24:35 AM       BNP 05/17/2020 1,137*     Sodium 05/17/2020 132*     Potassium 05/17/2020 4.7      Chloride 05/17/2020  101      CO2 05/17/2020 23      Anion Gap, Calculation 05/17/2020 8      Glucose 05/17/2020 221*     Calcium 05/17/2020 9.5      BUN 05/17/2020 15      Creatinine 05/17/2020 1.12*     GFR MDRD Af Amer 05/17/2020 >60      GFR MDRD Non Af Amer 05/17/2020 52*     Magnesium 05/17/2020 1.4*     Troponin I 05/17/2020 0.03      WBC 05/17/2020 10.4      RBC 05/17/2020 3.94      Hemoglobin 05/17/2020 12.0      Hematocrit 05/17/2020 37.2      MCV 05/17/2020 94      MCH 05/17/2020 30.5      MCHC 05/17/2020 32.3      RDW 05/17/2020 16.3*     Platelets 05/17/2020 192      MPV 05/17/2020 11.0      Neutrophils % 05/17/2020 43*     Lymphocytes % 05/17/2020 45*     Monocytes % 05/17/2020 8      Eosinophils % 05/17/2020 3      Basophils % 05/17/2020 0      Neutrophils Absolute 05/17/2020 4.5      Lymphocytes Absolute 05/17/2020 4.7*     Monocytes Absolute 05/17/2020 0.9      Eosinophils Absolute 05/17/2020 0.3      Basophils Absolute 05/17/2020 0.0      Alcohol, Blood 05/17/2020 <10      COVID-19 VIRUS SPECIMEN * 05/17/2020 Nasopharyngeal      2019-nCOV 05/17/2020 Test received-See reflex to IDDL test SARS CoV2 (COVID-19) Virus RT-PCR      SARS-CoV-2 Virus Specime* 05/17/2020 Nasopharyngeal      SARS-CoV-2 PCR Result 05/17/2020 NEGATIVE      SARS-COV-2 PCR COMMENT 05/17/2020 The [employer] COVID-19 direct PCR assay by [employer] on the [employer] MDX instrument        Pertinent Radiology  Radiology Results: See Report  EKG Results: personally reviewed.  and See Report       Current Outpatient Medications:      albuterol (PROAIR HFA;PROVENTIL HFA;VENTOLIN HFA) 90 mcg/actuation inhaler, Inhale 2 puffs every 4 (four) hours as needed for wheezing., Disp: 1 Inhaler, Rfl: 0     aspirin 81 mg chewable tablet, Chew 1 tablet (81 mg total) daily., Disp: 30 tablet, Rfl: 0     carvediloL (COREG) 3.125 MG tablet, Take 1 tablet (3.125 mg total) by mouth 2 (two) times a day., Disp: 60 tablet, Rfl: 0     furosemide (LASIX) 40 MG tablet, Take 1 tablet (40  mg total) by mouth 2 (two) times a day at 9am and 6pm., Disp: 10 tablet, Rfl: 0     lisinopriL (PRINIVIL,ZESTRIL) 5 MG tablet, Take 1 tablet (5 mg total) by mouth daily., Disp: 30 tablet, Rfl: 0     metFORMIN (GLUCOPHAGE-XR) 500 MG 24 hr tablet, Take 1 tablet (500 mg total) by mouth daily with breakfast., Disp: 60 tablet, Rfl: 0     albuterol (PROVENTIL) 2.5 mg /3 mL (0.083 %) nebulizer solution, Take 3 mL (2.5 mg total) by nebulization every 6 (six) hours as needed for wheezing., Disp: 75 mL, Rfl: 0     cetirizine (ZYRTEC) 10 MG tablet, Take 1 tablet (10 mg total) by mouth daily. (Patient taking differently: Take 10 mg by mouth daily as needed. ), Disp: 30 tablet, Rfl: 0

## 2021-06-11 NOTE — TELEPHONE ENCOUNTER
No return call received from patient. Results letter mailed on 9/18/20. -desire    ----- Message from Temi Jackman sent at 9/21/2020  2:12 PM CDT -----  General phone call:    Caller: Patient  Primary cardiologist: Mirela  Detailed reason for call: Patient calling in for test results  New or active symptoms?   Best phone number: [phone]  Best time to contact: Anytime  Ok to leave a detailed message? Yes  Device? no    Additional Info:

## 2021-06-11 NOTE — TELEPHONE ENCOUNTER
Wellness Screening Tool  Symptom Screening:  Do you have one of the following NEW symptoms:    Fever (subjective or >100.0)?  No    A new cough?  No    Shortness of breath?  No     Chills? No     New loss of taste or smell? No     Generalized body aches? No     New persistent headache? No     New sore throat? No     Nausea, vomiting, or diarrhea?  No    Within the past 2 weeks, have you been exposed to someone with a known positive illness below:    COVID-19 (known or suspected)?  No    Chicken pox?  No    Mealses?  No    Pertussis?  No    Patient notified of visitor policy- They may have one person accompany them to their appointment, but they will need to wear a mask and will be screened upon arrival for symptoms: Yes  Pt informed to wear a mask: Yes  Pt notified if they develop any symptoms listed above, prior to their appointment, they are to call the clinic directly at [phone] for further instructions.  Yes  Patient's appointment status: Patient will be seen in clinic as scheduled on 9/11/20

## 2021-06-11 NOTE — PATIENT INSTRUCTIONS - HE
We arrange follow up in the CHF Clinic 2-3 weeks    Continue Lasix once daily in AM    Continue lisinopril at previous dose.    Advance coreg to one tablet ( each 3.125 mg ) twice a day for 1 week then advance to two tablets twice a day.  When presciption completed I will send new dose that is 6.25 mg and you can take one table of this dose twice a day.    We will arrange cardiac echo

## 2021-06-12 NOTE — PROGRESS NOTES
Patient seen in clinic for HF education     Reviewed HF Binder that includes the  HF Sx Awareness & Action plan  handout and  A Stronger Pump  booklet and Weight log booklet highlighting :  __X_patient s type of heart failure _X__Na management in diet  __X_importance of daily wts  _X__Fluid Guidelines, if applicable  __X_medication review and importance of compliance   Lola is a current smoker, trying to cut back on her smoking. She is recently  living with her parents and helping them out as her father is undergoing a hip surgery soon. She was encouraged to maintain regular appointments, weigh herself daily, fluid restriction of 2liters daily and sodium restriction of 2000mg daily.   Instructed patient in signs and sx of heart failure, reiterated when to call clinic - reviewed HF hotline # [phone] and after hours call # [phone].  Majority of time was spent reviewing: low sodium diet ideas and ways to accomplish this without breaking the bank. Lola was in a parker today, and not interested in staying very long for prolonged education. She was limited in her attention span.   Patient verbalized understanding of HF discussion. No formal f/u scheduled with nurse clinician for continued education - will continue to reinforce HF management education.  Liss Hannah RN, BSN

## 2021-06-12 NOTE — PATIENT INSTRUCTIONS - HE
Lola Arias,    It was a pleasure to see you today at the [facility].     My recommendations after this visit include:  - No changes to your medications.    - Limit salt in your diet.   - Weigh yourself daily.  - Continue to work on quitting smoking.   - Follow up with me in 1 month and with Dr. Dietz in 2 months.   - Please call my nurse Liss if you have any concerns: [phone]    Starla Sal, CNP

## 2021-06-20 NOTE — LETTER
"Letter by Kizzy Chavez RN at      Author: Kizzy Chavez RN Service: -- Author Type: --    Filed:  Encounter Date: 9/18/2020 Status: (Other)         Lola Arias  [address] [place] [address]             September 18, 2020         Dear Ms. Arias,    Below are the results from your recent visit:    Resulted Orders   Basic metabolic panel   Result Value Ref Range    Sodium 138 136 - 145 mmol/L    Potassium 5.5 (H) 3.5 - 5.0 mmol/L    Chloride 101 98 - 107 mmol/L    CO2 28 22 - 31 mmol/L    Anion Gap, Calculation 9 5 - 18 mmol/L    Glucose 123 70 - 125 mg/dL    Calcium 9.9 8.5 - 10.5 mg/dL    BUN 16 8 - 22 mg/dL    Creatinine 1.00 0.60 - 1.10 mg/dL    GFR MDRD Af Amer >60 >60 mL/min/1.73m2    GFR MDRD Non Af Amer 59 (L) >60 mL/min/1.73m2    Narrative    Fasting Glucose reference range is 70-99 mg/dL per  American Diabetes Association (ADA) guidelines.   Magnesium   Result Value Ref Range    Magnesium 2.0 1.8 - 2.6 mg/dL                     Comments from Dr. Dietz regarding recent lab results: \"potassium magnesium levels are not depressed.  This would not likely be the cause of her spasms.  Kidney function appears normal, continue with current dose of diuretic\".      Please call with questions or contact us using [employer].    Sincerely,        Kizzy SNIDER RN for Dr. Aaron Dietz       "

## 2021-06-21 NOTE — LETTER
Letter by Aaron Dietz MD at      Author: Aaron Dietz MD Service: -- Author Type: --    Filed:  Encounter Date: 5/13/2021 Status: (Other)         Lola Arias  [address] [facility] [address]      May 13, 2021      Dear Lola,    This letter is to remind you that you are due for your follow up appointment with Dr. Aaron Dietz . To help ensure you are in the best health possible, a regular follow-up with your cardiologist is essential.     Please call our Patient Scheduling Line at [phone] to schedule your appointment at your earliest convenience.  If you have recently scheduled an appointment, please disregard this letter.    We look forward to seeing you again. As always, we are available at the number  above for any questions or concerns you may have.      Sincerely,     [facility]

## 2021-06-28 DIAGNOSIS — Z86.73 HISTORY OF CVA (CEREBROVASCULAR ACCIDENT): ICD-10-CM

## 2021-06-28 DIAGNOSIS — I10 BENIGN ESSENTIAL HYPERTENSION: ICD-10-CM

## 2021-06-28 DIAGNOSIS — I50.9 CHRONIC CONGESTIVE HEART FAILURE, UNSPECIFIED HEART FAILURE TYPE (H): ICD-10-CM

## 2021-06-28 RX ORDER — LISINOPRIL 5 MG/1
15 TABLET ORAL DAILY
Qty: 90 TABLET | Refills: 4 | Status: SHIPPED | OUTPATIENT
Start: 2021-06-28 | End: 2021-09-07

## 2021-06-29 NOTE — PROGRESS NOTES
Progress Notes by Starla Sal CNP at 10/2/2020  2:50 PM     Author: Starla Sal CNP Service: -- Author Type: Nurse Practitioner    Filed: 10/2/2020  3:32 PM Encounter Date: 10/2/2020 Status: Signed    : Starla Sal CNP (Nurse Practitioner)             Assessment/Recommendations   Assessment:    1. Nonischemic cardiomyopathy, heart failure with reduced ejection fraction, ejection fraction 35-40% (improved from 24%): She has no symptoms of acute heart failure. We reviewed heart failure diagnosis, medications, treatment plan, low sodium diet, weight monitoring, and symptom monitoring.  She met with a heart failure nurse clinician to further discuss.  Medication titration is limited at this time due to some lightheadedness with last dose increase of carvedilol.  Blood pressure 108/70    2.  Hypertension: Blood pressure 100/70.    3.  Tobacco use: She currently smokes half pack per day and would like to quit.    Plan:  1.   Heart failure medications:  - Beta blocker therapy with carvedilol 6.25 mg twice daily  - ACEI therapy with lisinopril 5 mg daily  - Diuretic therapy with furosemide 40 mg daily  2.  Low-sodium diet and daily weights  3.  No medication changes  4.  Smoking cessation  5.  Potassium level pending    Lola Arias will follow up with me in 1 month to consider further medication titration.  She is requesting follow-up with Dr. Dietz; follow-up in 2 months.       History of Present Illness/Subjective    Ms. Lola Arias is a 50 y.o. female seen at [facility] today for continued follow-up.  She has a history of heart failure with reduced ejection fraction, COPD, diabetes, and chronic kidney disease.  She was seen in the emergency room on September 8, 2020 with dyspnea on exertion.    She was seen by Dr. Dietz urgently after ED visit on September 11, 2020.  Carvedilol was increased.  Echocardiogram on September 29, 2020 showed  ejection fraction of 35 to 40%, mild mitral regurgitation, and mild tricuspid regurgitation.  Her ejection fraction has improved from 24% in January 2020.  Coronary angiogram in September 2019 was normal.    She notes significant improvement in her symptoms over the past few weeks.  She denies any shortness of breath or orthopnea.  She no longer has lower extremity edema.  She notes a slight increase in dizziness since carvedilol was increased.  She denies any falls and dizziness is not limiting her activity.  She denies fatigue and chest pain.      She occasionally weighs herself at home.  Home weights are typically 159 to 165 pounds.  She is working on following a low-sodium diet.      ECHO:   Results for orders placed during the hospital encounter of 09/28/20   Echo Complete [ECH10] 09/28/2020    Narrative   Left ventricle is mildly dilated with mild hypertrophy.    Left ventricular ejection fraction is moderately reduced and estimated   at 35-40%.    Normal right ventricular size and systolic function.    Mild mitral and tricuspid valve regurgitation is identified.    When compared to the previous study dated 1/20/2020, the degree of   tricuspid regurgitation is less and the left ventricular function has   improved.           Physical Examination Review of Systems   Vitals:    10/02/20 1449   BP: 108/70   Pulse: 64   Resp: 24     Body mass index is 30.62 kg/m .  Wt Readings from Last 3 Encounters:   10/02/20 167 lb 6.4 oz (75.9 kg)   09/28/20 169 lb (76.7 kg)   09/11/20 169 lb (76.7 kg)       General Appearance:     Alert, cooperative and in no acute distress.   ENT/Mouth: membranes moist, no oral lesions or bleeding gums.      EYES:  no scleral icterus, normal conjunctivae   Chest/Lungs:   lungs are clear to auscultation, no rales or wheezing, respirations unlabored   Cardiovascular:   Regular. Normal first and second heart sounds, no edema bilateral lower extremities    Abdomen:  Soft, nontender, nondistended,  bowel sounds present   Extremities: no cyanosis or clubbing   Skin: warm, dry.    Neurologic: mood and affect are appropriate, alert and oriented x3      General: WNL  Eyes: WNL  Ears/Nose/Throat: WNL  Lungs: WNL  Heart: WNL  Stomach: WNL  Bladder: WNL  Muscle/Joints: WNL  Skin: WNL  Nervous System: Loss of Balance  Mental Health: WNL     Blood: WNL     Medical History  Surgical History Family History Social History   Past Medical History:   Diagnosis Date   ? Acute bronchitis    ? Acute upper respiratory infections of unspecified site    ? Anxiety state, unspecified    ? Calculus of kidney    ? Cervicalgia    ? Cramp of limb    ? Diabetes mellitus (H)    ? Impaired fasting glucose    ? Kidney stone    ? Myocardial infarction (H)    ? Obesity, unspecified    ? Other abnormal Papanicolaou smear of cervix and cervical HPV(795.09)    ? Thoracic or lumbosacral neuritis or radiculitis, unspecified    ? Unspecified hypothyroidism     Past Surgical History:   Procedure Laterality Date   ? BACK SURGERY      7/2011 and 12/2011. Low back. Done at [facility].   ? CHOLECYSTECTOMY     ? CV CORONARY ANGIOGRAM N/A 9/5/2019    Procedure: Coronary Angiogram;  Surgeon: Patric Davies MD;  Location: [facility] Cath Lab;  Service: Cardiology   ? CV LEFT HEART CATHETERIZATION WO LEFT VETRICULOGRAM Left 9/5/2019    Procedure: Left Heart Catheterization Without Left Ventriculogram;  Surgeon: Patric Davies MD;  Location: [facility] Cath Lab;  Service: Cardiology   ? KIDNEY STONE SURGERY     ? TONSILLECTOMY      Family History   Problem Relation Age of Onset   ? Heart disease Mother    ? Cancer Mother         uterine   ? Hypertension Father    ? Urolithiasis Paternal Uncle    ? Diabetes Maternal Grandfather    ? Urolithiasis Paternal Uncle    ? Clotting disorder Neg Hx    ? Gout Neg Hx     Social History     Socioeconomic History   ? Marital status: Single     Spouse name: Not on file   ? Number of children: Not on file    ? Years of education: Not on file   ? Highest education level: Not on file   Occupational History   ? Occupation: Telemarketing   Social Needs   ? Financial resource strain: Not on file   ? Food insecurity     Worry: Not on file     Inability: Not on file   ? Transportation needs     Medical: Not on file     Non-medical: Not on file   Tobacco Use   ? Smoking status: Current Every Day Smoker     Packs/day: 0.50     Types: Cigarettes   ? Smokeless tobacco: Never Used   Substance and Sexual Activity   ? Alcohol use: No     Comment: Recovering alcoholic, sober for 7 months.    ? Drug use: No   ? Sexual activity: Not on file   Lifestyle   ? Physical activity     Days per week: Not on file     Minutes per session: Not on file   ? Stress: Not on file   Relationships   ? Social connections     Talks on phone: Not on file     Gets together: Not on file     Attends Restorationist service: Not on file     Active member of club or organization: Not on file     Attends meetings of clubs or organizations: Not on file     Relationship status: Not on file   ? Intimate partner violence     Fear of current or ex partner: Not on file     Emotionally abused: Not on file     Physically abused: Not on file     Forced sexual activity: Not on file   Other Topics Concern   ? Not on file   Social History Narrative   ? Not on file          Medications  Allergies   Current Outpatient Medications   Medication Sig Dispense Refill   ? albuterol (PROAIR HFA;PROVENTIL HFA;VENTOLIN HFA) 90 mcg/actuation inhaler Inhale 2 puffs every 4 (four) hours as needed for wheezing. 1 Inhaler 0   ? albuterol (PROVENTIL) 2.5 mg /3 mL (0.083 %) nebulizer solution Take 3 mL (2.5 mg total) by nebulization every 6 (six) hours as needed for wheezing. 75 mL 0   ? carvediloL (COREG) 6.25 MG tablet Take 1 tablet (6.25 mg total) by mouth 2 (two) times a day. 60 tablet 11   ? furosemide (LASIX) 40 MG tablet Take 1 tablet (40 mg total) by mouth daily. 90 tablet 1   ?  lisinopriL (PRINIVIL,ZESTRIL) 5 MG tablet Take 1 tablet (5 mg total) by mouth daily. 30 tablet 11   ? metFORMIN (GLUCOPHAGE-XR) 500 MG 24 hr tablet Take 1 tablet (500 mg total) by mouth daily with breakfast. 60 tablet 0     No current facility-administered medications for this visit.     No Known Allergies      Lab Results    Chemistry/lipid CBC Cardiac Enzymes/BNP/TSH/INR   Lab Results   Component Value Date    CHOL 148 09/06/2019    HDL 52 09/06/2019    LDLCALC 78 09/06/2019    TRIG 89 09/06/2019    CREATININE 1.00 09/11/2020    BUN 16 09/11/2020    K 5.5 (H) 09/11/2020     09/11/2020     09/11/2020    CO2 28 09/11/2020    Lab Results   Component Value Date    WBC 8.6 09/08/2020    HGB 13.0 09/08/2020    HCT 40.4 09/08/2020    MCV 98 09/08/2020     09/08/2020    Lab Results   Component Value Date    TROPONINI 0.09 09/08/2020    BNP 1,842 (H) 09/08/2020    TSH 1.44 01/30/2020    INR 1.00 09/05/2019

## 2021-08-09 ENCOUNTER — APPOINTMENT (OUTPATIENT)
Dept: RADIOLOGY | Facility: HOSPITAL | Age: 51
End: 2021-08-09
Attending: EMERGENCY MEDICINE
Payer: COMMERCIAL

## 2021-08-09 ENCOUNTER — HOSPITAL ENCOUNTER (OUTPATIENT)
Facility: HOSPITAL | Age: 51
Setting detail: OBSERVATION
Discharge: HOME | End: 2021-08-09
Attending: EMERGENCY MEDICINE | Admitting: EMERGENCY MEDICINE
Payer: COMMERCIAL

## 2021-08-09 ENCOUNTER — OFFICE VISIT (OUTPATIENT)
Dept: FAMILY MEDICINE | Facility: CLINIC | Age: 51
End: 2021-08-09
Payer: COMMERCIAL

## 2021-08-09 VITALS
OXYGEN SATURATION: 98 % | WEIGHT: 182 LBS | DIASTOLIC BLOOD PRESSURE: 63 MMHG | SYSTOLIC BLOOD PRESSURE: 138 MMHG | HEIGHT: 62 IN | RESPIRATION RATE: 18 BRPM | HEART RATE: 78 BPM | BODY MASS INDEX: 33.49 KG/M2 | TEMPERATURE: 97.3 F

## 2021-08-09 VITALS
DIASTOLIC BLOOD PRESSURE: 77 MMHG | TEMPERATURE: 98 F | HEART RATE: 91 BPM | BODY MASS INDEX: 30.76 KG/M2 | WEIGHT: 182 LBS | SYSTOLIC BLOOD PRESSURE: 116 MMHG | OXYGEN SATURATION: 100 %

## 2021-08-09 DIAGNOSIS — E11.9 DIABETES MELLITUS WITHOUT COMPLICATION (H): ICD-10-CM

## 2021-08-09 DIAGNOSIS — I10 ESSENTIAL HYPERTENSION: ICD-10-CM

## 2021-08-09 DIAGNOSIS — I50.22 CHRONIC SYSTOLIC CONGESTIVE HEART FAILURE (H): ICD-10-CM

## 2021-08-09 DIAGNOSIS — R07.9 CHEST PAIN, UNSPECIFIED TYPE: Primary | ICD-10-CM

## 2021-08-09 DIAGNOSIS — R07.9 CHEST PAIN, UNSPECIFIED TYPE: ICD-10-CM

## 2021-08-09 DIAGNOSIS — G89.29 CHRONIC BILATERAL LOW BACK PAIN WITHOUT SCIATICA: ICD-10-CM

## 2021-08-09 DIAGNOSIS — M54.50 CHRONIC BILATERAL LOW BACK PAIN WITHOUT SCIATICA: ICD-10-CM

## 2021-08-09 DIAGNOSIS — R94.31 T WAVE INVERSION IN EKG: ICD-10-CM

## 2021-08-09 DIAGNOSIS — N18.5 CKD (CHRONIC KIDNEY DISEASE) STAGE 5, GFR LESS THAN 15 ML/MIN (H): ICD-10-CM

## 2021-08-09 LAB
ANION GAP SERPL CALCULATED.3IONS-SCNC: 9 MMOL/L (ref 5–18)
BASOPHILS # BLD AUTO: 0.1 10E3/UL (ref 0–0.2)
BASOPHILS NFR BLD AUTO: 1 %
BUN SERPL-MCNC: 17 MG/DL (ref 8–22)
CALCIUM SERPL-MCNC: 9.9 MG/DL (ref 8.5–10.5)
CHLORIDE BLD-SCNC: 105 MMOL/L (ref 98–107)
CO2 SERPL-SCNC: 24 MMOL/L (ref 22–31)
CREAT SERPL-MCNC: 1.19 MG/DL (ref 0.6–1.1)
D DIMER PPP FEU-MCNC: 0.43 UG/ML FEU (ref 0–0.5)
EOSINOPHIL # BLD AUTO: 0.1 10E3/UL (ref 0–0.7)
EOSINOPHIL NFR BLD AUTO: 2 %
ERYTHROCYTE [DISTWIDTH] IN BLOOD BY AUTOMATED COUNT: 12.4 % (ref 10–15)
GFR SERPL CREATININE-BSD FRML MDRD: 53 ML/MIN/1.73M2
GLUCOSE BLD-MCNC: 213 MG/DL (ref 70–125)
HCT VFR BLD AUTO: 34.2 % (ref 35–47)
HGB BLD-MCNC: 11.9 G/DL (ref 11.7–15.7)
IMM GRANULOCYTES # BLD: 0 10E3/UL
IMM GRANULOCYTES NFR BLD: 0 %
LYMPHOCYTES # BLD AUTO: 3.2 10E3/UL (ref 0.8–5.3)
LYMPHOCYTES NFR BLD AUTO: 38 %
MCH RBC QN AUTO: 33.9 PG (ref 26.5–33)
MCHC RBC AUTO-ENTMCNC: 34.8 G/DL (ref 31.5–36.5)
MCV RBC AUTO: 97 FL (ref 78–100)
MONOCYTES # BLD AUTO: 0.9 10E3/UL (ref 0–1.3)
MONOCYTES NFR BLD AUTO: 11 %
NEUTROPHILS # BLD AUTO: 4.1 10E3/UL (ref 1.6–8.3)
NEUTROPHILS NFR BLD AUTO: 48 %
NRBC # BLD AUTO: 0 10E3/UL
NRBC BLD AUTO-RTO: 0 /100
PLATELET # BLD AUTO: 234 10E3/UL (ref 150–450)
POTASSIUM BLD-SCNC: 4.6 MMOL/L (ref 3.5–5)
RBC # BLD AUTO: 3.51 10E6/UL (ref 3.8–5.2)
SODIUM SERPL-SCNC: 138 MMOL/L (ref 136–145)
TROPONIN I SERPL-MCNC: 0.01 NG/ML (ref 0–0.29)
TROPONIN I SERPL-MCNC: <0.01 NG/ML (ref 0–0.29)
WBC # BLD AUTO: 8.4 10E3/UL (ref 4–11)

## 2021-08-09 PROCEDURE — 96374 THER/PROPH/DIAG INJ IV PUSH: CPT

## 2021-08-09 PROCEDURE — 93005 ELECTROCARDIOGRAM TRACING: CPT | Performed by: STUDENT IN AN ORGANIZED HEALTH CARE EDUCATION/TRAINING PROGRAM

## 2021-08-09 PROCEDURE — 99214 OFFICE O/P EST MOD 30 MIN: CPT | Mod: GC | Performed by: STUDENT IN AN ORGANIZED HEALTH CARE EDUCATION/TRAINING PROGRAM

## 2021-08-09 PROCEDURE — 85025 COMPLETE CBC W/AUTO DIFF WBC: CPT | Performed by: EMERGENCY MEDICINE

## 2021-08-09 PROCEDURE — 36415 COLL VENOUS BLD VENIPUNCTURE: CPT | Performed by: EMERGENCY MEDICINE

## 2021-08-09 PROCEDURE — 80048 BASIC METABOLIC PNL TOTAL CA: CPT | Performed by: EMERGENCY MEDICINE

## 2021-08-09 PROCEDURE — 250N000013 HC RX MED GY IP 250 OP 250 PS 637: Performed by: EMERGENCY MEDICINE

## 2021-08-09 PROCEDURE — 96365 THER/PROPH/DIAG IV INF INIT: CPT

## 2021-08-09 PROCEDURE — G0378 HOSPITAL OBSERVATION PER HR: HCPCS

## 2021-08-09 PROCEDURE — 250N000011 HC RX IP 250 OP 636: Performed by: EMERGENCY MEDICINE

## 2021-08-09 PROCEDURE — 99285 EMERGENCY DEPT VISIT HI MDM: CPT | Mod: 25

## 2021-08-09 PROCEDURE — 84484 ASSAY OF TROPONIN QUANT: CPT | Performed by: EMERGENCY MEDICINE

## 2021-08-09 PROCEDURE — 71046 X-RAY EXAM CHEST 2 VIEWS: CPT

## 2021-08-09 PROCEDURE — 93005 ELECTROCARDIOGRAM TRACING: CPT | Performed by: EMERGENCY MEDICINE

## 2021-08-09 PROCEDURE — 85379 FIBRIN DEGRADATION QUANT: CPT | Performed by: EMERGENCY MEDICINE

## 2021-08-09 RX ORDER — MORPHINE SULFATE 2 MG/ML
2 INJECTION, SOLUTION INTRAMUSCULAR; INTRAVENOUS
Status: COMPLETED | OUTPATIENT
Start: 2021-08-09 | End: 2021-08-09

## 2021-08-09 RX ORDER — CYCLOBENZAPRINE HCL 5 MG
5-10 TABLET ORAL 2 TIMES DAILY PRN
Qty: 30 TABLET | Refills: 0 | Status: SHIPPED | OUTPATIENT
Start: 2021-08-09 | End: 2021-09-14

## 2021-08-09 RX ORDER — ASPIRIN 81 MG/1
81 TABLET, CHEWABLE ORAL ONCE
Status: COMPLETED | OUTPATIENT
Start: 2021-08-09 | End: 2021-08-09

## 2021-08-09 RX ORDER — OXYCODONE HYDROCHLORIDE 5 MG/1
5 TABLET ORAL ONCE
Status: COMPLETED | OUTPATIENT
Start: 2021-08-09 | End: 2021-08-09

## 2021-08-09 RX ORDER — NITROGLYCERIN 0.4 MG/1
0.4 TABLET SUBLINGUAL EVERY 5 MIN PRN
Status: DISCONTINUED | OUTPATIENT
Start: 2021-08-09 | End: 2021-08-10 | Stop reason: HOSPADM

## 2021-08-09 RX ADMIN — ASPIRIN 81 MG: 81 TABLET, CHEWABLE ORAL at 19:36

## 2021-08-09 RX ADMIN — NITROGLYCERIN 0.4 MG: 0.4 TABLET SUBLINGUAL at 19:43

## 2021-08-09 RX ADMIN — MORPHINE SULFATE 2 MG: 2 INJECTION, SOLUTION INTRAMUSCULAR; INTRAVENOUS at 20:14

## 2021-08-09 RX ADMIN — NITROGLYCERIN 0.4 MG: 0.4 TABLET SUBLINGUAL at 19:36

## 2021-08-09 RX ADMIN — OXYCODONE HYDROCHLORIDE 5 MG: 5 TABLET ORAL at 23:31

## 2021-08-09 ASSESSMENT — MIFFLIN-ST. JEOR: SCORE: 1398.8

## 2021-08-09 NOTE — ED NOTES
Expected Patient Referral to ED  4:57 PM    Referring Clinic/Provider:  [facility]    Reason for referral/Clinical facts:  Chest pain, h/o CAD    Caller was informed that this institution does  possess the capabilities and/or resources to provide for patient and should be transferred to our institution.    Informed caller that recommendations provided are recommendations based only on the facts provided and that they responsible to accept or reject the advice, or to seek a formal in person consultation as needed and that this ED will see/treat patient should they arrive.      Donte Perez MD  Emergency Medicine  [facility] EMERGENCY DEPARTMENT  [address] [place] [id number]  [phone]       Donte Perez MD  08/09/21 7206

## 2021-08-09 NOTE — PROGRESS NOTES
Assessment and Plan   (R07.9) Chest pain, unspecified type  (primary encounter diagnosis)  Comment: Patient has been having four days of left sided chest pressure. She has signs of both typical and atypical chest pain. Given her significant heart history, story, and HEART score of 4 before EKG, she would benefit for a MI rule out at the emergency department.   Plan: -Send to ED    (I50.22) Chronic systolic congestive heart failure (H)  Comment: Patient is up 18 lbs over the past several months with diaphoresis at home. She states that she is eating healthier at home and has cut out sweets and pop at home. Concerned for a heart failure exacerbation as well given her history and   Plan: Send to emergency department for heart failure work-up    (I10) Essential hypertension  Comment: Pressure is 116/77 today in clinic. Patient has been taking her blood pressure medications at home and has not been measuring at home. She feels symptomatic at home with diaphoresis and headaches, but this is unclear if this is due to heart complications vs hypertensive episodes.   Plan: -Send to emergency department for MI r/o, cardiac work-up and possible heart failure work-up.     (M54.5,  G89.29) Chronic bilateral low back pain without sciatica  Comment: Has been having muscle spasms for the past several weeks. She states that this improves with cyclobenzaprine. Will prescribe a small prescription for her muscle spasms.   Plan: cyclobenzaprine (FLEXERIL) 5 MG tablet    (N18.5) CKD (chronic kidney disease) stage 5, GFR less than 15 ml/min (H)  Comment: Historical problem.  Most recent BMP improved compared to this.  Plan: Follow-up visit for CKD management.  May need nephro referral.    Send to the emergency department for r/o of MI.     Options for treatment and follow-up care were reviewed with the patient and/or guardian. Lola Arias and/or guardian engaged in the decision making process and verbalized understanding of the  options discussed and agreed with the final plan.    Holger Sunshine  Medical Student, MS3    Resident/Fellow Attestation   I, Miah Chester, was present with the medical/ONIEL student who participated in the service and in the documentation of the note.  I have verified the history and personally performed the physical exam and medical decision making.  I agree with the assessment and plan of care as documented in the note.      Jesús Chester MD  [facility], PGY-3    Precepted patient with Dr. Claudio Perry       HPI:   Lola Arias is a 51 year old female with a history of MI, systolic CHF, T2DM, tobacco dependence, and GERD who presents to clinic today for   Chief Complaint   Patient presents with     Chest Pain     chest pain for 4 days     Hypertension     htn     -Last four days, left chest pain with associated shortness of breath, diaphoresis, intermittent bilateral arm pain, lightheadedness  -Quality: pressure and pinching pain  -Worse with lying down   -Not worse with exercise   -One week of abdominal pain as well   -No alleviating factors  -No arm numbness, tinnitus, nausea, vomiting, dehydration  -Unsure if similar to previous pain or MI in the past   -2/4 days has had a headache at home with lightheadedness  -Taking her medications  -Feels as though her blood pressure medication isn't working  -Joined [employer] two weeks ago and has been working out  -Been eating more healthy at home for the past three weeks and has cut out the pop and sweets  -0.5 ppd  -No recent illnesses    2. Back pain   -Has been having intermittent muscle spasms for the past couple of weeks  -Would like a refill on her cyclobenzaprine for her muscle spasms         Review of Systems:     10 point ROS negative except as noted in HPI.         PMHX:   Active Problems List  Patient Active Problem List   Diagnosis      Chronic back pain     Alcohol dependence in remission (H)     Diabetes mellitus, type 2 (H)     Bipolar disorder (H)     Depression with anxiety     H/O tubal ligation     Hypothyroidism     GERD (gastroesophageal reflux disease)     Tobacco dependence syndrome     Irritable bowel syndrome     Displacement of lumbar intervertebral disc     Peptic ulcer     Depressed mood     Obesity     Essential hypertension     Pap smear abnormality of cervix with LGSIL     Lumbar degenerative disc disease     Chronic systolic congestive heart failure (H)     Nephrolithiasis     Nondependent cocaine abuse, episodic (H)     T wave inversion in EKG     Chest pain, unspecified type     CKD (chronic kidney disease) stage 5, GFR less than 15 ml/min (H)     Active problem list reviewed and updated.    Current Medications  Current Outpatient Medications   Medication Sig Dispense Refill     cyclobenzaprine (FLEXERIL) 5 MG tablet Take 1-2 tablets (5-10 mg) by mouth 2 times daily as needed for muscle spasms 30 tablet 0     albuterol (PROAIR HFA/PROVENTIL HFA/VENTOLIN HFA) 108 (90 Base) MCG/ACT inhaler Inhale 2 puffs into the lungs every 6 hours as needed for shortness of breath / dyspnea or wheezing 1 Inhaler 1     aspirin 81 MG chewable tablet Take 1 tablet (81 mg) by mouth daily 108 tablet 3     carvedilol (COREG) 3.125 MG tablet Take 2 tablets (6.25 mg) by mouth 2 times daily (with meals) (Patient taking differently: Take 6.25 mg by mouth daily ) 120 tablet 3     furosemide (LASIX) 40 MG tablet Take 1 tablet (40 mg) by mouth daily 60 tablet 1     lisinopril (ZESTRIL) 5 MG tablet Take 3 tablets (15 mg) by mouth daily 90 tablet 4     metFORMIN (GLUCOPHAGE-XR) 500 MG 24 hr tablet Take 1 tablet (500 mg) by mouth daily (with dinner) 60 tablet 1     nitroGLYcerin (NITROSTAT) 0.4 MG sublingual tablet Place 1 tablet (0.4 mg) under the tongue every 5 minutes as needed for chest pain If you are still having symptoms after 3 doses (15 minutes) call  911. 25 tablet 3     omeprazole (PRILOSEC) 20 MG DR capsule Take 1 capsule (20 mg) by mouth daily 30 capsule 11     STATIN NOT PRESCRIBED, INTENTIONAL, Statin not prescribed intentionally due to Other LDL wnl, ASCVD <7.5% (This option does not exclude patient from measure) (Patient not taking: Reported on 5/6/2021) 0 each 0     Medication list reviewed and updated.    Social History  Social History     Tobacco Use     Smoking status: Current Every Day Smoker     Packs/day: 0.50     Types: Cigarettes     Smokeless tobacco: Never Used     Tobacco comment: Declined CIQ, not ready to quit. smoking about 10 a day   Substance Use Topics     Alcohol use: No     Alcohol/week: 0.0 standard drinks     Drug use: No     History   Drug Use No       Family History  Family History   Problem Relation Age of Onset     Other Cancer Father         Oral     Hypertension Father      Hypertension Maternal Grandmother      Diabetes Other         Paternal Uncle     Hypertension Other         Paternal Uncle     Asthma No family hx of      Heart Disease Mother      Cancer Mother         uterine     Urolithiasis Paternal Uncle      Diabetes Maternal Grandfather      Urolithiasis Paternal Uncle      Clotting Disorder No family hx of      Gout No family hx of        Allergies  Allergies   Allergen Reactions     Nkda [No Known Drug Allergies]             Physical Exam:     Vitals:    08/09/21 1613   BP: 116/77   Pulse: 91   Temp: 98  F (36.7  C)   TempSrc: Oral   SpO2: 100%   Weight: 82.6 kg (182 lb)     Body mass index is 30.76 kg/m .    GENERAL APPEARANCE: alert, appears stated age, no acute distress  HEENT: Eyes grossly normal to inspection, nares normal, MMM  RESP: lungs clear to auscultation - no rales, rhonchi, or wheezes, no increased respiratory effort  CV: regular rate and rhythm, no murmurs  ABDOMEN: soft, nontender, nondistended  MSK: extremities normal, no gross deformities noted, no lower extremity edema  SKIN: no suspicious lesions  or rashes   NEURO: Normal strength and tone, sensory exam grossly normal, mentation appears intact and speech normal  PSYCH: mood and affect appropriate

## 2021-08-09 NOTE — PROGRESS NOTES
Preceptor Attestation:   Patient seen, evaluated and discussed with the resident. I have verified the content of the note, which accurately reflects my assessment of the patient and the plan of care.   Supervising Physician:  Claudio Perry MD Preceptor Attestation:   Patient seen, evaluated and discussed with the resident. I have verified the content of the note, which accurately reflects my assessment of the patient and the plan of care.   Supervising Physician:  Claudio Perry MD Preceptor Attestation:   Patient seen, evaluated and discussed with the resident. I have verified the content of the note, which accurately reflects my assessment of the patient and the plan of care.   Supervising Physician:  Claudio Perry MD

## 2021-08-09 NOTE — ED PROVIDER NOTES
EMERGENCY DEPARTMENT ENCOUNTER      NAME: Lola Arias  AGE: 51 year old female  YOB: 1970  MRN: [id number]  EVALUATION DATE & TIME: 8/9/2021  6:50 PM    PCP: Maldonado Darling    ED PROVIDER: Fly Barlow M.D.      Chief Complaint   Patient presents with     Chest Pain         FINAL IMPRESSION:  1. Chest pain, unspecified type    2. T wave inversion in EKG          ED COURSE & MEDICAL DECISION MAKING:    Pertinent Labs & Imaging studies reviewed. (See chart for details)    7:08 PM Met with patient for initial interview and exam. Plan for care in the ED was discussed. I saw the patient wearing an N95, eye protection, and gloves.   8:42 PM Repeat exam unchanged.  11:19 PM Repeat exam benign, discussed findings and discharge.    51 year old female presents to the Emergency Department for evaluation of chest pain. Patient appears non toxic with stable vitals signs, patient is afebrile with no tachycardia or hypoxia, no increased work of breathing. Overall exam is benign.  Lungs are clear and abdomen is benign.  She denies any ripping or tearing chest discomfort of the back or shoulders, pleurisy, hemoptysis or productive cough.  No fever or body aches and again she is afebrile here.  Nothing to suggest pneumonia, Covid, PE, dissection, esophageal rupture, lungs are clear with no wheezing to suggest COPD, asthma or other restrictive lung process.  Concern for ACS, no epigastric tenderness or abdominal pain to suggest pancreatitis or cholecystitis.  We did obtain ECG which showed new T wave inversions.  We did obtain screening labs and a chest x-ray.  Patient was given aspirin, pain medications.    Reassessment: Troponin negative, rest the labs showed no acute concerning findings, initial ECG showed T wave inversion.  D-dimer negative, chest x-ray reported no acute concerning findings.  Repeat exam is benign and the patient symptoms were well controlled here.  That said with cardiac risk  factors and T wave inversion recommended admission for serial troponins and continued monitoring and observation.  At this time, patient was deferring admission requesting discharge.  Did convince the patient to stay for repeat 3-hour delta troponin and ECG.  These returned and showed no acute concerning findings, troponin was negative.  Repeat exam was again benign the patient continued to appear well.  That said, again offered admission, discussed at length risk and benefits of discharge without serial troponins or continued monitoring I feel the patient understands the risks and has capacity.  She continues to request discharge and so she will be discharged with instructions to follow-up with primary care and rapid access heart care clinic in the next 48 hours for continued outpatient management evaluation.  Discussed all these other findings recommendations the patient felt reassured and comfortable with discharge.  Strict return precautions provided.    At the conclusion of the encounter I discussed the results of all of the tests and the disposition. The questions were answered and return precautions provided. The patient or family acknowledged understanding and was agreeable with the care plan.         MEDICATIONS GIVEN IN THE EMERGENCY:  Medications   nitroGLYcerin (NITROSTAT) sublingual tablet 0.4 mg (0.4 mg Sublingual Given 8/9/21 1943)   morphine (PF) injection 2 mg (2 mg Intravenous Given 8/9/21 2014)   aspirin (ASA) chewable tablet 81 mg (81 mg Oral Given 8/9/21 1936)   oxyCODONE (ROXICODONE) tablet 5 mg (5 mg Oral Given 8/9/21 2331)       NEW PRESCRIPTIONS STARTED AT TODAY'S ER VISIT  Discharge Medication List as of 8/9/2021 11:35 PM               =================================================================    HPI    Patient information was obtained from: Patient    Use of Intrepreter: N/A         Lola Arias is a 51 year old female with a PMHx of CHF, HTN, MI, and CVA who presents via walk in  "for evaluation of chest pain.     Per chart review, patient was seen at the [facility] with Dr. Chester earlier today for chest pain. Given her heart history, story, and HEART score of 4 prior to EKG, he recommended patient be seen at the ED for MI rule out.     Patient presents with mid sternal chest pain for the past 4 days. Pain is described as a pinching and pushing pain in the center of her chest. She notes some upper abdominal pain and reports that her chest pain occasionally radiates down her bilateral arms. No palliating or provoking factors identified. Additionally patient reports that she has had a headache for the past 2 days, back spasms, and has felt generally \"hot\". She denies any known fevers. Pain is 7/10 at present. She has a history of a myocardial infarction back in 1996. Patient states she did not take any aspirin today, but has taken tylenol for the pain.     Patient denies vomiting, hematuria, dysuria, cough, rash, or changes to bowel or bladder habits. She denies shortness of breath, but reports some labored breathing. Patient endorses tobacco use. She denies alcohol or street drug use. No allergies to medications.       REVIEW OF SYSTEMS   Constitutional: Endorses feeling \"hot\". Denies fever, chills  Respiratory: Endorses labored breathing. Denies productive cough.  Cardiovascular: Endorses mid sternal chest pain (pinching and pushing sensation). Denies palpitations  GI: Endorses upper abdominal pain. Denies nausea, vomiting, or change in bowel or bladder habits   Musculoskeletal: Endorses back spasms. Denies any new muscle/joint swelling  Skin:  Denies rash   Neurologic: Endorses headache. Denies focal weakness  All systems negative except as marked.     PAST MEDICAL HISTORY:  Past Medical History:   Diagnosis Date     Acute bronchitis      Acute upper respiratory infections of unspecified site      Alcohol dependence in remission (H) 11/8/2013     Anxiety state, " unspecified      Bipolar disorder, unspecified (H) 11/15/2013     Calculus of kidney      Cervicalgia      Chronic back pain 11/8/2013     Cramp of limb      Diabetes mellitus (H)      Diabetes mellitus (H)      Impaired fasting glucose      Kidney stone      Kidney stones      Myocardial infarction (H)      Obesity, unspecified      Other abnormal Papanicolaou smear of cervix and cervical HPV(795.09)      Thoracic or lumbosacral neuritis or radiculitis, unspecified      Unspecified hypothyroidism        PAST SURGICAL HISTORY:  Past Surgical History:   Procedure Laterality Date     BACK SURGERY      7/2011 and 12/2011. Low back. Done at [facility].     CHOLECYSTECTOMY       CHOLECYSTECTOMY       CV CORONARY ANGIOGRAM N/A 9/5/2019    Procedure: Coronary Angiogram;  Surgeon: Patric Davies MD;  Location: [facility];  Service: Cardiology     CV LEFT HEART CATHETERIZATION WITHOUT LEFT VENTRICULOGRAM Left 9/5/2019    Procedure: Left Heart Catheterization Without Left Ventriculogram;  Surgeon: Patric Davies MD;  Location: [facility];  Service: Cardiology     EYE SURGERY       GENITOURINARY SURGERY       KIDNEY STONE SURGERY       ORTHOPEDIC SURGERY       TONSILLECTOMY       TUBAL LIGATION           CURRENT MEDICATIONS:    Prior to Admission medications    Medication Sig Start Date End Date Taking? Authorizing Provider   albuterol (PROAIR HFA/PROVENTIL HFA/VENTOLIN HFA) 108 (90 Base) MCG/ACT inhaler Inhale 2 puffs into the lungs every 6 hours as needed for shortness of breath / dyspnea or wheezing 8/19/20   Praneeth Bashir MD   aspirin 81 MG chewable tablet Take 1 tablet (81 mg) by mouth daily 9/25/17   Palla, Misbah Yousuf, MD   carvedilol (COREG) 3.125 MG tablet Take 2 tablets (6.25 mg) by mouth 2 times daily (with meals) 3/18/21   Hever Zavala MD   carvedilol (COREG) 6.25 MG tablet Take 6.25 mg by mouth 10/2/20   Reported, Patient   cyclobenzaprine (FLEXERIL) 5 MG tablet Take 1-2  tablets (5-10 mg) by mouth 2 times daily as needed for muscle spasms 8/9/21   Claudio Perry MD   furosemide (LASIX) 40 MG tablet Take 1 tablet (40 mg) by mouth daily 3/18/21   Hever Zavala MD   lisinopril (ZESTRIL) 5 MG tablet Take 3 tablets (15 mg) by mouth daily 6/28/21   Geri Barney DO   metFORMIN (GLUCOPHAGE-XR) 500 MG 24 hr tablet Take 1 tablet (500 mg) by mouth daily (with dinner) 3/18/21   Hever Zavala MD   nitroGLYcerin (NITROSTAT) 0.4 MG sublingual tablet Place 1 tablet (0.4 mg) under the tongue every 5 minutes as needed for chest pain If you are still having symptoms after 3 doses (15 minutes) call 911. 3/18/21   Hever Zavala MD   omeprazole (PRILOSEC) 20 MG DR capsule Take 1 capsule (20 mg) by mouth daily 5/6/21   Hever Zavala MD   STATIN NOT PRESCRIBED, INTENTIONAL, Statin not prescribed intentionally due to Other LDL wnl, ASCVD <7.5% (This option does not exclude patient from measure)  Patient not taking: Reported on 5/6/2021 11/22/16   Palla, Misbah Yousuf, MD        ALLERGIES:  Allergies   Allergen Reactions     Nkda [No Known Drug Allergies]        FAMILY HISTORY:  Family History   Problem Relation Age of Onset     Other Cancer Father         Oral     Hypertension Father      Hypertension Maternal Grandmother      Diabetes Other         Paternal Uncle     Hypertension Other         Paternal Uncle     Asthma No family hx of      Heart Disease Mother      Cancer Mother         uterine     Urolithiasis Paternal Uncle      Diabetes Maternal Grandfather      Urolithiasis Paternal Uncle      Clotting Disorder No family hx of      Gout No family hx of        SOCIAL HISTORY:   Social History     Socioeconomic History     Marital status: Single     Spouse name: Not on file     Number of children: Not on file     Years of education: Not on file     Highest education level: Not on file   Occupational History     Occupation: Employed   Tobacco Use     Smoking status: Current Every Day  "Smoker     Packs/day: 0.50     Types: Cigarettes     Smokeless tobacco: Never Used     Tobacco comment: Declined CIQ, not ready to quit. smoking about 10 a day   Substance and Sexual Activity     Alcohol use: No     Alcohol/week: 0.0 standard drinks     Drug use: No     Sexual activity: Not on file   Other Topics Concern     Parent/sibling w/ CABG, MI or angioplasty before 65F 55M? Not Asked   Social History Narrative    Education: 12th    Child: Philomena 15     Social Determinants of Health     Financial Resource Strain:      Difficulty of Paying Living Expenses:    Food Insecurity:      Worried About Running Out of Food in the Last Year:      Ran Out of Food in the Last Year:    Transportation Needs:      Lack of Transportation (Medical):      Lack of Transportation (Non-Medical):    Physical Activity:      Days of Exercise per Week:      Minutes of Exercise per Session:    Stress:      Feeling of Stress :    Social Connections:      Frequency of Communication with Friends and Family:      Frequency of Social Gatherings with Friends and Family:      Attends Alevism Services:      Active Member of Clubs or Organizations:      Attends Club or Organization Meetings:      Marital Status:    Intimate Partner Violence:      Fear of Current or Ex-Partner:      Emotionally Abused:      Physically Abused:      Sexually Abused:        VITALS:  Patient Vitals for the past 24 hrs:   BP Temp Pulse Resp SpO2 Height Weight   08/09/21 2300 138/63 -- 78 18 98 % -- --   08/09/21 2200 (!) 141/63 -- 79 21 100 % -- --   08/09/21 2100 127/68 -- 78 26 99 % -- --   08/09/21 2045 139/68 -- 78 15 99 % -- --   08/09/21 2030 (!) 149/70 -- 80 16 99 % -- --   08/09/21 1934 138/70 -- 81 20 99 % -- --   08/09/21 1837 (!) 155/93 97.3  F (36.3  C) 89 24 92 % 1.575 m (5' 2\") 82.6 kg (182 lb)        PHYSICAL EXAM    Constitutional:  Awake, alert, in no apparent distress  HENT:  Normocephalic, Atraumatic. Bilateral external ears normal. Oropharynx " moist. Nose normal. Neck- Normal range of motion with no guarding, No midline cervical tenderness, Supple, No stridor.   Eyes:  PERRL, EOMI with no signs of entrapment, Conjunctiva normal, No discharge.   Respiratory:  Normal breath sounds, No respiratory distress, No wheezing.    Cardiovascular:  Normal heart rate, Normal rhythm, No appreciable rubs or gallops.   GI:  Soft, No tenderness, No distension, No palpable masses  Musculoskeletal:  Intact distal pulses, No edema. Good range of motion in all major joints. No tenderness to palpation or major deformities noted.  Integument:  Warm, Dry, No erythema, No rash.   Neurologic:  Alert & oriented, Normal motor function, Normal sensory function, No focal deficits noted.   Psychiatric:  Affect normal, Judgment normal, Mood normal.     LAB:  All pertinent labs reviewed and interpreted.  Results for orders placed or performed during the hospital encounter of 08/09/21   XR Chest 2 Views    Impression    IMPRESSION: Negative chest.   D dimer quantitative   Result Value Ref Range    D-Dimer Quantitative 0.43 0.00 - 0.50 ug/mL FEU   Basic metabolic panel   Result Value Ref Range    Sodium 138 136 - 145 mmol/L    Potassium 4.6 3.5 - 5.0 mmol/L    Chloride 105 98 - 107 mmol/L    Carbon Dioxide (CO2) 24 22 - 31 mmol/L    Anion Gap 9 5 - 18 mmol/L    Urea Nitrogen 17 8 - 22 mg/dL    Creatinine 1.19 (H) 0.60 - 1.10 mg/dL    Calcium 9.9 8.5 - 10.5 mg/dL    Glucose 213 (H) 70 - 125 mg/dL    GFR Estimate 53 (L) >60 mL/min/1.73m2   Result Value Ref Range    Troponin I 0.01 0.00 - 0.29 ng/mL   CBC with platelets and differential   Result Value Ref Range    WBC Count 8.4 4.0 - 11.0 10e3/uL    RBC Count 3.51 (L) 3.80 - 5.20 10e6/uL    Hemoglobin 11.9 11.7 - 15.7 g/dL    Hematocrit 34.2 (L) 35.0 - 47.0 %    MCV 97 78 - 100 fL    MCH 33.9 (H) 26.5 - 33.0 pg    MCHC 34.8 31.5 - 36.5 g/dL    RDW 12.4 10.0 - 15.0 %    Platelet Count 234 150 - 450 10e3/uL    % Neutrophils 48 %    %  Lymphocytes 38 %    % Monocytes 11 %    % Eosinophils 2 %    % Basophils 1 %    % Immature Granulocytes 0 %    NRBCs per 100 WBC 0 <1 /100    Absolute Neutrophils 4.1 1.6 - 8.3 10e3/uL    Absolute Lymphocytes 3.2 0.8 - 5.3 10e3/uL    Absolute Monocytes 0.9 0.0 - 1.3 10e3/uL    Absolute Eosinophils 0.1 0.0 - 0.7 10e3/uL    Absolute Basophils 0.1 0.0 - 0.2 10e3/uL    Absolute Immature Granulocytes 0.0 <=0.0 10e3/uL    Absolute NRBCs 0.0 10e3/uL   Result Value Ref Range    Troponin I <0.01 0.00 - 0.29 ng/mL       RADIOLOGY:  XR Chest 2 Views   Final Result   IMPRESSION: Negative chest.             EKG:    Normal sinus rhythm, no specific ST acute ischemic changes, no concerning dysrhythmias or interval prolongation, did note T wave inversion in lead III and aVF, when compared to ECG of September 8, 2020 new T wave inversions appreciated but no specific ST acute ischemic changes    Repeat ECG shows sinus rhythm, no specific ST acute ischemic changes, T wave inversion in aVF is now nonspecific, less deep in lead III otherwise no other specific ischemic changes when compared to prior.    I have independently reviewed and interpreted the EKG(s) documented above.    PROCEDURES:         I, Tangela Loza, am serving as a scribe to document services personally performed by Fly Barlow MD, based on my observation and the provider's statements to me. I, Fly Barlow MD attest that Tangela Loza is acting in a scribe capacity, has observed my performance of the services and has documented them in accordance with my direction.    Fly Barlow M.D.  Emergency Medicine  [facility] EMERGENCY DEPARTMENT  [address] [id number] [phone]  Dept: [phone]       Fly Barlow MD  08/10/21 0034

## 2021-08-10 NOTE — PHARMACY-ADMISSION MEDICATION HISTORY
Pharmacy Note - Admission Medication History    Pertinent Provider Information: Carvedilol prescribed twice daily but patient reports only taking once a day.     ______________________________________________________________________    Prior To Admission (PTA) med list completed and updated in EMR.       PTA Med List   Medication Sig Last Dose     albuterol (PROAIR HFA/PROVENTIL HFA/VENTOLIN HFA) 108 (90 Base) MCG/ACT inhaler Inhale 2 puffs into the lungs every 6 hours as needed for shortness of breath / dyspnea or wheezing      aspirin 81 MG chewable tablet Take 1 tablet (81 mg) by mouth daily 8/8/2021 at Unknown time     carvedilol (COREG) 3.125 MG tablet Take 2 tablets (6.25 mg) by mouth 2 times daily (with meals) (Patient taking differently: Take 6.25 mg by mouth daily ) 8/9/2021 at Unknown time     cyclobenzaprine (FLEXERIL) 5 MG tablet Take 1-2 tablets (5-10 mg) by mouth 2 times daily as needed for muscle spasms      furosemide (LASIX) 40 MG tablet Take 1 tablet (40 mg) by mouth daily 8/9/2021 at Unknown time     lisinopril (ZESTRIL) 5 MG tablet Take 3 tablets (15 mg) by mouth daily 8/9/2021 at Unknown time     metFORMIN (GLUCOPHAGE-XR) 500 MG 24 hr tablet Take 1 tablet (500 mg) by mouth daily (with dinner) 8/9/2021 at Unknown time     nitroGLYcerin (NITROSTAT) 0.4 MG sublingual tablet Place 1 tablet (0.4 mg) under the tongue every 5 minutes as needed for chest pain If you are still having symptoms after 3 doses (15 minutes) call 911.      omeprazole (PRILOSEC) 20 MG DR capsule Take 1 capsule (20 mg) by mouth daily 8/9/2021 at Unknown time       Information source(s): Patient and CareEverywhere/SureScripts  Method of interview communication: in-person    Summary of Changes to PTA Med List  New: None  Discontinued: None  Changed: Carvedilol bid to every day     Patient was asked about OTC/herbal products specifically.  PTA med list reflects this.    In the past week, patient estimated taking medication this  percent of the time:  50-90% due to other.    Allergies were reviewed, assessed, and updated with the patient.      Patient did not bring any medications to the hospital and can't retrieve from home. No multi-dose medications are available for use during hospital stay.     The information provided in this note is only as accurate as the sources available at the time of the update(s).    Thank you for the opportunity to participate in the care of this patient.    Padmini Montesinos [facility]  8/9/2021 9:16 PM

## 2021-08-10 NOTE — ED NOTES
"Pt rates chest pain as 7/10, like someone is \"pressing on me\" or \"pinching\" at times. Pt states pain has been intermittent the past 3 days. Pt reports occasional mild SOB but denies n/v or diaphoresis.   "

## 2021-08-11 PROBLEM — N18.5 CKD (CHRONIC KIDNEY DISEASE) STAGE 5, GFR LESS THAN 15 ML/MIN (H): Status: ACTIVE | Noted: 2021-08-11

## 2021-08-12 RX ORDER — METFORMIN HCL 500 MG
500 TABLET, EXTENDED RELEASE 24 HR ORAL
Qty: 60 TABLET | Refills: 0 | Status: SHIPPED | OUTPATIENT
Start: 2021-08-12 | End: 2021-09-07

## 2021-08-17 LAB
ATRIAL RATE - MUSE: 81 BPM
ATRIAL RATE - MUSE: 84 BPM
DIASTOLIC BLOOD PRESSURE - MUSE: 67 MMHG
DIASTOLIC BLOOD PRESSURE - MUSE: NORMAL MMHG
INTERPRETATION ECG - MUSE: NORMAL
INTERPRETATION ECG - MUSE: NORMAL
P AXIS - MUSE: 30 DEGREES
P AXIS - MUSE: 52 DEGREES
PR INTERVAL - MUSE: 166 MS
PR INTERVAL - MUSE: 168 MS
QRS DURATION - MUSE: 102 MS
QRS DURATION - MUSE: 94 MS
QT - MUSE: 366 MS
QT - MUSE: 378 MS
QTC - MUSE: 425 MS
QTC - MUSE: 446 MS
R AXIS - MUSE: -21 DEGREES
R AXIS - MUSE: -25 DEGREES
SYSTOLIC BLOOD PRESSURE - MUSE: 152 MMHG
SYSTOLIC BLOOD PRESSURE - MUSE: NORMAL MMHG
T AXIS - MUSE: -13 DEGREES
T AXIS - MUSE: 13 DEGREES
VENTRICULAR RATE- MUSE: 81 BPM
VENTRICULAR RATE- MUSE: 84 BPM

## 2021-08-19 ENCOUNTER — OFFICE VISIT (OUTPATIENT)
Dept: CARDIOLOGY | Facility: CLINIC | Age: 51
End: 2021-08-19
Attending: EMERGENCY MEDICINE
Payer: COMMERCIAL

## 2021-08-19 VITALS
WEIGHT: 182.2 LBS | SYSTOLIC BLOOD PRESSURE: 110 MMHG | HEIGHT: 62 IN | DIASTOLIC BLOOD PRESSURE: 60 MMHG | RESPIRATION RATE: 24 BRPM | BODY MASS INDEX: 33.53 KG/M2 | HEART RATE: 91 BPM

## 2021-08-19 DIAGNOSIS — R94.31 T WAVE INVERSION IN EKG: ICD-10-CM

## 2021-08-19 DIAGNOSIS — R07.9 CHEST PAIN, UNSPECIFIED TYPE: ICD-10-CM

## 2021-08-19 PROCEDURE — 99214 OFFICE O/P EST MOD 30 MIN: CPT | Performed by: INTERNAL MEDICINE

## 2021-08-19 ASSESSMENT — MIFFLIN-ST. JEOR: SCORE: 1399.7

## 2021-08-19 NOTE — LETTER
8/19/2021    Maldonado Darling MD  [address] [facility] [place] [address]    RE: Lola Arias       Dear Colleague,    I had the pleasure of seeing Lola Arias in the [facility].          Thank you, Dr. Barlow, for asking [facility] to evaluate Ms. Lola Arias.      Assessment/Recommendations   Assessment:    Chest pain, nonexertional, atypical, likely noncardiac, normal coronary angiogram in 2018  History of nonischemic dilated cardiomyopathy with mildly depressed LV systolic function, no overt fluid overload  Bipolar disorder  Obesity      Plan:  I reassured her that we have not seen any indications of myocardial injury and likelihood of development of obstructive coronary disease is very low in short interval from recent coronary angiogram.    I stressed the importance of being compliant with cardiac medications  We will reassess LV function/wall motion abnormality/pericardium with echo  If there is no significant new abnormalities no further cardiac testing is indicated.  Should follow-up with Dr. Aguero her regular cardiologist     History of Present Illness    Ms. Lola Arias is a 51 year old female who comes into rapid access clinic for follow-up from the emergency room.  She developed central nonexertional chest discomfort.  Pain is nonpleuritic.  She has had this pain for several days.  She denies any relationship to food intake.  There is no positional features of the pain.  She denies any injury to the chest.  She went to the emergency room.  She had negative troponins and normal chest x-ray.  She has a history of atypical chest pain and depressed LV systolic function.  She had normal invasive coronary angiogram in 2018.  She states that she has been compliant with cardiac medication.  She denies PND, orthopnea, pedal edema.  She has not had heart palpitations or syncope.    ECG: Personally reviewed.  Normal  "sinus rhythm LVH, no acute ischemic changes    Echocardiogram: September 2020    Left ventricle is mildly dilated with mild hypertrophy.    Left ventricular ejection fraction is moderately reduced and estimated at 35-40%.    Normal right ventricular size and systolic function.    Mild mitral and tricuspid valve regurgitation is identified.    When compared to the previous study dated 1/20/2020, the degree of tricuspid regurgitation is less and the left ventricular function has improved.         Coronary Angiogram: September 2019  Normal coronary arteries in a left dominant system  Severely elevated LVEDP  Suspect DCM or stress induced CM          Physical Examination Review of Systems   /60 (BP Location: Right arm, Patient Position: Sitting, Cuff Size: Adult Large)   Pulse 91   Resp 24   Ht 1.575 m (5' 2\")   Wt 82.6 kg (182 lb 3.2 oz)   BMI 33.32 kg/m    Body mass index is 33.32 kg/m .  Wt Readings from Last 3 Encounters:   08/19/21 82.6 kg (182 lb 3.2 oz)   08/09/21 82.6 kg (182 lb)   08/09/21 82.6 kg (182 lb)     General Appearance:   Alert, cooperative, no distress, appears stated age   Head/ENT: Normocephalic, without obvious abnormality. Membranes moist      EYES:  no scleral icterus, normal conjunctivae   Neck: Supple, symmetrical, trachea midline, no adenopathy, thyroid: not enlarged, symmetric, no carotid bruit or JVD   Chest/Lungs:   Lungs are clear to auscultation, respirations unlabored. No tenderness or deformity    Cardiovascular:   Regular rhythm, S1, S2 normal, no murmur, rub or gallop.   Abdomen:  Soft, non-tender, bowel sounds active all four quadrants,  no masses, no organomegaly   Extremities: no cyanosis or clubbing. No edema   Skin: Skin color, texture, turgor normal, no rashes or lesions.    Psychiatric: Normal affect, calm   Neurologic: Alert and oriented x 3, moving all four extremities.     Enc Vitals  BP: 110/60  Pulse: 91  Resp: 24  Weight: 82.6 kg (182 lb 3.2 oz)  Height: 157.5 " "cm (5' 2\")                                          Lab Results    Chemistry/lipid CBC Cardiac Enzymes/BNP/TSH/INR   Recent Labs   Lab Test 03/29/21  1622   CHOL 183.0   HDL 72.0   LDL 88.0   TRIG 118.0   CHOLHDLRATIO 2.5     Recent Labs   Lab Test 03/29/21  1622 09/06/19  0524 02/07/18  1342   LDL 88.0 78 67     Recent Labs   Lab Test 08/09/21  1932      POTASSIUM 4.6   CHLORIDE 105   CO2 24   *   BUN 17   CR 1.19*   GFRESTIMATED 53*   HARLEY 9.9     Recent Labs   Lab Test 08/09/21  1932 03/29/21  1723 09/11/20  1406   CR 1.19* 0.91 1.00     Recent Labs   Lab Test 03/29/21  1622 01/31/20  0516 09/06/19  0524   A1C 7.4* 6.8* 8.0*          Recent Labs   Lab Test 08/09/21  1932   WBC 8.4   HGB 11.9   HCT 34.2*   MCV 97        Recent Labs   Lab Test 08/09/21  1932 09/08/20  1706 05/17/20  0243   HGB 11.9 13.0 12.0    Recent Labs   Lab Test 08/09/21  2238 08/09/21  1932 09/08/20  1706   TROPONINI <0.01 0.01 0.09     Recent Labs   Lab Test 09/08/20  1706 05/17/20  0243 02/14/20  2230   BNP 1,842* 1,137* 541*     Recent Labs   Lab Test 01/30/20  1627   TSH 1.44     Recent Labs   Lab Test 09/05/19  2151 11/22/13  1542   INR 1.00 0.9        Medical History  Surgical History Family History Social History   Past Medical History:   Diagnosis Date     Acute bronchitis      Acute upper respiratory infections of unspecified site      Alcohol dependence in remission (H) 11/8/2013     Anxiety state, unspecified      Bipolar disorder, unspecified (H) 11/15/2013     Calculus of kidney      Cervicalgia      Chronic back pain 11/8/2013     Cramp of limb      Diabetes mellitus (H)      Diabetes mellitus (H)      Impaired fasting glucose      Kidney stone      Kidney stones      Myocardial infarction (H)      Obesity, unspecified      Other abnormal Papanicolaou smear of cervix and cervical HPV(795.09)      Thoracic or lumbosacral neuritis or radiculitis, unspecified      Unspecified hypothyroidism      Past Surgical " History:   Procedure Laterality Date     BACK SURGERY      7/2011 and 12/2011. Low back. Done at [facility].     CHOLECYSTECTOMY       CHOLECYSTECTOMY       CV CORONARY ANGIOGRAM N/A 9/5/2019    Procedure: Coronary Angiogram;  Surgeon: Patric Davies MD;  Location: [facility];  Service: Cardiology     CV LEFT HEART CATHETERIZATION WITHOUT LEFT VENTRICULOGRAM Left 9/5/2019    Procedure: Left Heart Catheterization Without Left Ventriculogram;  Surgeon: Patric Davies MD;  Location: [facility];  Service: Cardiology     EYE SURGERY       GENITOURINARY SURGERY       KIDNEY STONE SURGERY       ORTHOPEDIC SURGERY       TONSILLECTOMY       TUBAL LIGATION       No premature CAD, SCD,cardiomyopathy   Social History     Socioeconomic History     Marital status: Single     Spouse name: Not on file     Number of children: Not on file     Years of education: Not on file     Highest education level: Not on file   Occupational History     Occupation: Employed   Tobacco Use     Smoking status: Current Every Day Smoker     Packs/day: 0.50     Types: Cigarettes     Smokeless tobacco: Never Used     Tobacco comment: Declined CIQ, not ready to quit. smoking about 10 a day   Substance and Sexual Activity     Alcohol use: No     Alcohol/week: 0.0 standard drinks     Drug use: No     Sexual activity: Not on file   Other Topics Concern     Parent/sibling w/ CABG, MI or angioplasty before 65F 55M? Not Asked   Social History Narrative    Education: 12th    Child: Philomena 15     Social Determinants of Health     Financial Resource Strain:      Difficulty of Paying Living Expenses:    Food Insecurity:      Worried About Running Out of Food in the Last Year:      Ran Out of Food in the Last Year:    Transportation Needs:      Lack of Transportation (Medical):      Lack of Transportation (Non-Medical):    Physical Activity:      Days of Exercise per Week:      Minutes of Exercise per Session:    Stress:       Feeling of Stress :    Social Connections:      Frequency of Communication with Friends and Family:      Frequency of Social Gatherings with Friends and Family:      Attends Amish Services:      Active Member of Clubs or Organizations:      Attends Club or Organization Meetings:      Marital Status:    Intimate Partner Violence:      Fear of Current or Ex-Partner:      Emotionally Abused:      Physically Abused:      Sexually Abused:          Medications  Allergies   Current Outpatient Medications   Medication Sig Dispense Refill     albuterol (PROAIR HFA/PROVENTIL HFA/VENTOLIN HFA) 108 (90 Base) MCG/ACT inhaler Inhale 2 puffs into the lungs every 6 hours as needed for shortness of breath / dyspnea or wheezing 1 Inhaler 1     aspirin 81 MG chewable tablet Take 1 tablet (81 mg) by mouth daily 108 tablet 3     carvedilol (COREG) 3.125 MG tablet Take 2 tablets (6.25 mg) by mouth 2 times daily (with meals) (Patient taking differently: Take 6.25 mg by mouth daily ) 120 tablet 3     cyclobenzaprine (FLEXERIL) 5 MG tablet Take 1-2 tablets (5-10 mg) by mouth 2 times daily as needed for muscle spasms 30 tablet 0     furosemide (LASIX) 40 MG tablet Take 1 tablet (40 mg) by mouth daily 60 tablet 1     lisinopril (ZESTRIL) 5 MG tablet Take 3 tablets (15 mg) by mouth daily 90 tablet 4     metFORMIN (GLUCOPHAGE-XR) 500 MG 24 hr tablet Take 1 tablet (500 mg) by mouth daily (with dinner) 60 tablet 0     nitroGLYcerin (NITROSTAT) 0.4 MG sublingual tablet Place 1 tablet (0.4 mg) under the tongue every 5 minutes as needed for chest pain If you are still having symptoms after 3 doses (15 minutes) call 911. 25 tablet 3     omeprazole (PRILOSEC) 20 MG DR capsule Take 1 capsule (20 mg) by mouth daily 30 capsule 11     STATIN NOT PRESCRIBED, INTENTIONAL, Statin not prescribed intentionally due to Other LDL wnl, ASCVD <7.5% (This option does not exclude patient from measure) (Patient not taking: Reported on 5/6/2021) 0 each 0         Allergies   Allergen Reactions     Nkda [No Known Drug Allergies]                        Thank you for allowing me to participate in the care of your patient.      Sincerely,     Irvin Pemberton MD     [facility]  cc:   Fly Barlow MD  [address] [place] [address]

## 2021-08-19 NOTE — PROGRESS NOTES
Thank you, Dr. Barlow, for asking [facility] to evaluate Ms. Lola Arias.      Assessment/Recommendations   Assessment:    Chest pain, nonexertional, atypical, likely noncardiac, normal coronary angiogram in 2018  History of nonischemic dilated cardiomyopathy with mildly depressed LV systolic function, no overt fluid overload  Bipolar disorder  Obesity      Plan:  I reassured her that we have not seen any indications of myocardial injury and likelihood of development of obstructive coronary disease is very low in short interval from recent coronary angiogram.    I stressed the importance of being compliant with cardiac medications  We will reassess LV function/wall motion abnormality/pericardium with echo  If there is no significant new abnormalities no further cardiac testing is indicated.  Should follow-up with Dr. Aguero her regular cardiologist     History of Present Illness    Ms. Lola Arias is a 51 year old female who comes into rapid access clinic for follow-up from the emergency room.  She developed central nonexertional chest discomfort.  Pain is nonpleuritic.  She has had this pain for several days.  She denies any relationship to food intake.  There is no positional features of the pain.  She denies any injury to the chest.  She went to the emergency room.  She had negative troponins and normal chest x-ray.  She has a history of atypical chest pain and depressed LV systolic function.  She had normal invasive coronary angiogram in 2018.  She states that she has been compliant with cardiac medication.  She denies PND, orthopnea, pedal edema.  She has not had heart palpitations or syncope.    ECG: Personally reviewed.  Normal sinus rhythm LVH, no acute ischemic changes    Echocardiogram: September 2020    Left ventricle is mildly dilated with mild hypertrophy.    Left ventricular ejection fraction is moderately reduced and estimated at 35-40%.    Normal right ventricular  "size and systolic function.    Mild mitral and tricuspid valve regurgitation is identified.    When compared to the previous study dated 1/20/2020, the degree of tricuspid regurgitation is less and the left ventricular function has improved.         Coronary Angiogram: September 2019  Normal coronary arteries in a left dominant system  Severely elevated LVEDP  Suspect DCM or stress induced CM          Physical Examination Review of Systems   /60 (BP Location: Right arm, Patient Position: Sitting, Cuff Size: Adult Large)   Pulse 91   Resp 24   Ht 1.575 m (5' 2\")   Wt 82.6 kg (182 lb 3.2 oz)   BMI 33.32 kg/m    Body mass index is 33.32 kg/m .  Wt Readings from Last 3 Encounters:   08/19/21 82.6 kg (182 lb 3.2 oz)   08/09/21 82.6 kg (182 lb)   08/09/21 82.6 kg (182 lb)     General Appearance:   Alert, cooperative, no distress, appears stated age   Head/ENT: Normocephalic, without obvious abnormality. Membranes moist      EYES:  no scleral icterus, normal conjunctivae   Neck: Supple, symmetrical, trachea midline, no adenopathy, thyroid: not enlarged, symmetric, no carotid bruit or JVD   Chest/Lungs:   Lungs are clear to auscultation, respirations unlabored. No tenderness or deformity    Cardiovascular:   Regular rhythm, S1, S2 normal, no murmur, rub or gallop.   Abdomen:  Soft, non-tender, bowel sounds active all four quadrants,  no masses, no organomegaly   Extremities: no cyanosis or clubbing. No edema   Skin: Skin color, texture, turgor normal, no rashes or lesions.    Psychiatric: Normal affect, calm   Neurologic: Alert and oriented x 3, moving all four extremities.     Enc Vitals  BP: 110/60  Pulse: 91  Resp: 24  Weight: 82.6 kg (182 lb 3.2 oz)  Height: 157.5 cm (5' 2\")                                          Lab Results    Chemistry/lipid CBC Cardiac Enzymes/BNP/TSH/INR   Recent Labs   Lab Test 03/29/21  1622   CHOL 183.0   HDL 72.0   LDL 88.0   TRIG 118.0   CHOLHDLRATIO 2.5     Recent Labs   Lab Test " 03/29/21  1622 09/06/19  0524 02/07/18  1342   LDL 88.0 78 67     Recent Labs   Lab Test 08/09/21  1932      POTASSIUM 4.6   CHLORIDE 105   CO2 24   *   BUN 17   CR 1.19*   GFRESTIMATED 53*   HARLEY 9.9     Recent Labs   Lab Test 08/09/21  1932 03/29/21  1723 09/11/20  1406   CR 1.19* 0.91 1.00     Recent Labs   Lab Test 03/29/21  1622 01/31/20  0516 09/06/19  0524   A1C 7.4* 6.8* 8.0*          Recent Labs   Lab Test 08/09/21  1932   WBC 8.4   HGB 11.9   HCT 34.2*   MCV 97        Recent Labs   Lab Test 08/09/21  1932 09/08/20  1706 05/17/20  0243   HGB 11.9 13.0 12.0    Recent Labs   Lab Test 08/09/21  2238 08/09/21  1932 09/08/20  1706   TROPONINI <0.01 0.01 0.09     Recent Labs   Lab Test 09/08/20  1706 05/17/20  0243 02/14/20  2230   BNP 1,842* 1,137* 541*     Recent Labs   Lab Test 01/30/20  1627   TSH 1.44     Recent Labs   Lab Test 09/05/19  2151 11/22/13  1542   INR 1.00 0.9        Medical History  Surgical History Family History Social History   Past Medical History:   Diagnosis Date     Acute bronchitis      Acute upper respiratory infections of unspecified site      Alcohol dependence in remission (H) 11/8/2013     Anxiety state, unspecified      Bipolar disorder, unspecified (H) 11/15/2013     Calculus of kidney      Cervicalgia      Chronic back pain 11/8/2013     Cramp of limb      Diabetes mellitus (H)      Diabetes mellitus (H)      Impaired fasting glucose      Kidney stone      Kidney stones      Myocardial infarction (H)      Obesity, unspecified      Other abnormal Papanicolaou smear of cervix and cervical HPV(795.09)      Thoracic or lumbosacral neuritis or radiculitis, unspecified      Unspecified hypothyroidism      Past Surgical History:   Procedure Laterality Date     BACK SURGERY      7/2011 and 12/2011. Low back. Done at [facility].     CHOLECYSTECTOMY       CHOLECYSTECTOMY       CV CORONARY ANGIOGRAM N/A 9/5/2019    Procedure: Coronary Angiogram;  Surgeon: Samson  Patric CH MD;  Location: [facility] Cath Lab;  Service: Cardiology     CV LEFT HEART CATHETERIZATION WITHOUT LEFT VENTRICULOGRAM Left 9/5/2019    Procedure: Left Heart Catheterization Without Left Ventriculogram;  Surgeon: Patric Davies MD;  Location: [facility] Cath Lab;  Service: Cardiology     EYE SURGERY       GENITOURINARY SURGERY       KIDNEY STONE SURGERY       ORTHOPEDIC SURGERY       TONSILLECTOMY       TUBAL LIGATION       No premature CAD, SCD,cardiomyopathy   Social History     Socioeconomic History     Marital status: Single     Spouse name: Not on file     Number of children: Not on file     Years of education: Not on file     Highest education level: Not on file   Occupational History     Occupation: Employed   Tobacco Use     Smoking status: Current Every Day Smoker     Packs/day: 0.50     Types: Cigarettes     Smokeless tobacco: Never Used     Tobacco comment: Declined CIQ, not ready to quit. smoking about 10 a day   Substance and Sexual Activity     Alcohol use: No     Alcohol/week: 0.0 standard drinks     Drug use: No     Sexual activity: Not on file   Other Topics Concern     Parent/sibling w/ CABG, MI or angioplasty before 65F 55M? Not Asked   Social History Narrative    Education: 12th    Child: Philomena 15     Social Determinants of Health     Financial Resource Strain:      Difficulty of Paying Living Expenses:    Food Insecurity:      Worried About Running Out of Food in the Last Year:      Ran Out of Food in the Last Year:    Transportation Needs:      Lack of Transportation (Medical):      Lack of Transportation (Non-Medical):    Physical Activity:      Days of Exercise per Week:      Minutes of Exercise per Session:    Stress:      Feeling of Stress :    Social Connections:      Frequency of Communication with Friends and Family:      Frequency of Social Gatherings with Friends and Family:      Attends Shinto Services:      Active Member of Clubs or Organizations:      Attends  Club or Organization Meetings:      Marital Status:    Intimate Partner Violence:      Fear of Current or Ex-Partner:      Emotionally Abused:      Physically Abused:      Sexually Abused:          Medications  Allergies   Current Outpatient Medications   Medication Sig Dispense Refill     albuterol (PROAIR HFA/PROVENTIL HFA/VENTOLIN HFA) 108 (90 Base) MCG/ACT inhaler Inhale 2 puffs into the lungs every 6 hours as needed for shortness of breath / dyspnea or wheezing 1 Inhaler 1     aspirin 81 MG chewable tablet Take 1 tablet (81 mg) by mouth daily 108 tablet 3     carvedilol (COREG) 3.125 MG tablet Take 2 tablets (6.25 mg) by mouth 2 times daily (with meals) (Patient taking differently: Take 6.25 mg by mouth daily ) 120 tablet 3     cyclobenzaprine (FLEXERIL) 5 MG tablet Take 1-2 tablets (5-10 mg) by mouth 2 times daily as needed for muscle spasms 30 tablet 0     furosemide (LASIX) 40 MG tablet Take 1 tablet (40 mg) by mouth daily 60 tablet 1     lisinopril (ZESTRIL) 5 MG tablet Take 3 tablets (15 mg) by mouth daily 90 tablet 4     metFORMIN (GLUCOPHAGE-XR) 500 MG 24 hr tablet Take 1 tablet (500 mg) by mouth daily (with dinner) 60 tablet 0     nitroGLYcerin (NITROSTAT) 0.4 MG sublingual tablet Place 1 tablet (0.4 mg) under the tongue every 5 minutes as needed for chest pain If you are still having symptoms after 3 doses (15 minutes) call 911. 25 tablet 3     omeprazole (PRILOSEC) 20 MG DR capsule Take 1 capsule (20 mg) by mouth daily 30 capsule 11     STATIN NOT PRESCRIBED, INTENTIONAL, Statin not prescribed intentionally due to Other LDL wnl, ASCVD <7.5% (This option does not exclude patient from measure) (Patient not taking: Reported on 5/6/2021) 0 each 0        Allergies   Allergen Reactions     Nkda [No Known Drug Allergies]

## 2021-08-31 DIAGNOSIS — I50.9 CHRONIC CONGESTIVE HEART FAILURE, UNSPECIFIED HEART FAILURE TYPE (H): ICD-10-CM

## 2021-08-31 DIAGNOSIS — K21.9 GASTROESOPHAGEAL REFLUX DISEASE WITHOUT ESOPHAGITIS: ICD-10-CM

## 2021-08-31 RX ORDER — FUROSEMIDE 40 MG
40 TABLET ORAL DAILY
Qty: 60 TABLET | Refills: 3 | Status: SHIPPED | OUTPATIENT
Start: 2021-08-31

## 2021-08-31 NOTE — TELEPHONE ENCOUNTER
[facility] phone call message- medication clarification/question:    Full Medication Name: FURSOMAIDE & OMEPRAZOLE   Dose: 40 MG AND 20 MG     Question: Patient would like medication sent to [facility] pharmacy on [address], patient currently with daughter. Patient wants refills today as she is out and wants a call back by today if it will be approved or not, I've made patient aware I can not guarantee a call back today and request typically takes about 2-3 business days. Please call and advise.     Pharmacy confirmed as [id number] - [place], [place] - [address]: No    OK to leave a message on voice mail? Yes    Primary language: English      needed? No    Call taken on August 31, 2021 at 9:20 AM by Sandie Motnes

## 2021-09-07 DIAGNOSIS — E11.9 DIABETES MELLITUS WITHOUT COMPLICATION (H): ICD-10-CM

## 2021-09-07 DIAGNOSIS — I10 BENIGN ESSENTIAL HYPERTENSION: ICD-10-CM

## 2021-09-07 DIAGNOSIS — I50.9 CHRONIC CONGESTIVE HEART FAILURE, UNSPECIFIED HEART FAILURE TYPE (H): ICD-10-CM

## 2021-09-07 DIAGNOSIS — Z86.73 HISTORY OF CVA (CEREBROVASCULAR ACCIDENT): ICD-10-CM

## 2021-09-08 ENCOUNTER — TRANSFERRED RECORDS (OUTPATIENT)
Dept: HEALTH INFORMATION MANAGEMENT | Facility: CLINIC | Age: 51
End: 2021-09-08

## 2021-09-08 RX ORDER — LISINOPRIL 5 MG/1
15 TABLET ORAL DAILY
Qty: 90 TABLET | Refills: 4 | Status: SHIPPED | OUTPATIENT
Start: 2021-09-08 | End: 2023-04-04

## 2021-09-08 RX ORDER — METFORMIN HCL 500 MG
500 TABLET, EXTENDED RELEASE 24 HR ORAL
Qty: 60 TABLET | Refills: 5 | Status: SHIPPED | OUTPATIENT
Start: 2021-09-08

## 2021-09-09 ENCOUNTER — TELEPHONE (OUTPATIENT)
Dept: FAMILY MEDICINE | Facility: CLINIC | Age: 51
End: 2021-09-09

## 2021-09-09 NOTE — TELEPHONE ENCOUNTER
I got the pt ED discharge paperwork, I called to check up on the pt and help setup a ED follow up. The pt was at [facility] for musculoskeletal problem. I called and talked to the pt daughter, she stated that the pt is doing fine, she is sleeping right now. She stated that she will have the pt call to schedule if she feels that she needs a follow up.

## 2021-09-14 ENCOUNTER — OFFICE VISIT (OUTPATIENT)
Dept: FAMILY MEDICINE | Facility: CLINIC | Age: 51
End: 2021-09-14
Payer: COMMERCIAL

## 2021-09-14 VITALS
BODY MASS INDEX: 32.96 KG/M2 | HEIGHT: 63 IN | WEIGHT: 186 LBS | DIASTOLIC BLOOD PRESSURE: 79 MMHG | SYSTOLIC BLOOD PRESSURE: 120 MMHG | RESPIRATION RATE: 20 BRPM | TEMPERATURE: 97.4 F | HEART RATE: 80 BPM | OXYGEN SATURATION: 99 %

## 2021-09-14 DIAGNOSIS — R07.9 CHEST PAIN, UNSPECIFIED TYPE: Primary | ICD-10-CM

## 2021-09-14 DIAGNOSIS — I50.9 CHRONIC CONGESTIVE HEART FAILURE, UNSPECIFIED HEART FAILURE TYPE (H): ICD-10-CM

## 2021-09-14 DIAGNOSIS — M54.50 CHRONIC BILATERAL LOW BACK PAIN WITHOUT SCIATICA: ICD-10-CM

## 2021-09-14 DIAGNOSIS — G89.29 CHRONIC BILATERAL LOW BACK PAIN WITHOUT SCIATICA: ICD-10-CM

## 2021-09-14 DIAGNOSIS — F32.A DEPRESSION, UNSPECIFIED DEPRESSION TYPE: ICD-10-CM

## 2021-09-14 PROCEDURE — 99214 OFFICE O/P EST MOD 30 MIN: CPT | Mod: GC | Performed by: STUDENT IN AN ORGANIZED HEALTH CARE EDUCATION/TRAINING PROGRAM

## 2021-09-14 RX ORDER — CARVEDILOL 3.12 MG/1
6.25 TABLET ORAL 2 TIMES DAILY WITH MEALS
Qty: 120 TABLET | Refills: 3 | Status: SHIPPED | OUTPATIENT
Start: 2021-09-14

## 2021-09-14 RX ORDER — CYCLOBENZAPRINE HCL 5 MG
5-10 TABLET ORAL 2 TIMES DAILY PRN
Qty: 30 TABLET | Refills: 0 | Status: SHIPPED | OUTPATIENT
Start: 2021-09-14

## 2021-09-14 ASSESSMENT — MIFFLIN-ST. JEOR: SCORE: 1426.43

## 2021-09-14 ASSESSMENT — PATIENT HEALTH QUESTIONNAIRE - PHQ9: SUM OF ALL RESPONSES TO PHQ QUESTIONS 1-9: 18

## 2021-09-14 NOTE — PROGRESS NOTES
"Date of Encounter: September 14, 2021  Visits Initiated By: Clinic  Reason for Encounter: PCP requested SW follow-up regarding patient reported homelessness and positive PHQ screen for suicidality this date.  Met With: Lola Arias    Summary and Notes: SW met with patient this date at request of PCP this date to follow-up regarding patient reported homelessness and positive PHQ for suicidality this date in appointment with PCP.     Patient reported that patient had intrusive thought that patient would be better off dead \"once within the last 14 days.\" Patient denied plan, intent or means at this time or within the past 14 days. Patient reported being surprised by the intrusive thought when it occurred on one instance. Patient and SW discussed suicidal ideation/planning/intent as symptom of significant anxiety and depression and need for ongoing self-monitoring for this symptom and notifying support system or healthcare professional.     Patient signed release of information to [facility] for housing supportive services this date. Patient reported living with daughter at this time and that this living situation is not able to be maintained long-term. SW to contact [facility] to complete referral process for services.    Follow-Up Based On Visit: SW to call [facility] regarding patient referral for housing support services and update patient.    ODELL KELLY, KIRA, ZARIA    "

## 2021-09-14 NOTE — PROGRESS NOTES
Assessment and Plan     Rib Contusion:  Fell down stairs, and went to ER hours after. CT and work up negative for fractures, so likely just contusions. Pain has improved. Has ran out of oxycodone now, but pain seems well controlled. No e/o bruising but some mild ttp on exam.  - Voltaren gel  - Tylenol     Anxiety/Depression:  Housing Insecurity:  Living with daughter currently, may not be long term plausible. Long hx of Anxiety and depression, stated she had suicidal thought the other day but no plan. Doesn't have these thoughts often, and doesn't recall last time. She is open to talking with psych now, as she was opposed to it previously. Discussed resources and provided crisis line information.   - SW met with patient today.  - Psych referral     Hx HF:  - Carvedilol refilled     Health Maintenance:  - Follow-up wellness visit 1 month    Options for treatment and follow-up care were reviewed with the patient and/or guardian. Lola Arias and/or guardian engaged in the decision making process and verbalized understanding of the options discussed and agreed with the final plan.    Aric Darling DO, MBA  [facility] Medicine Residency Program, PGY-3    Precepted patient with Dr. Robyn Mckeon       HPI:   Lola Arias is a 51 year old female who presents to clinic today for   Chief Complaint   Patient presents with     Hospital F/U     9/8/21 [facility] musculoskeletal problem     Refill Request     carvedilol     Medication Reconciliation     completed       Hospital Follow-up:  - Fell down stairs   - Injured left ribcage, was given Oxy 10 pills  - Some bruising  - CT negative for acute pathology   - Asking for more oxy here today  - Needs med refills for chronic conditions    Denies Fever, Chest Pain, shortness of breath , changes in vision/hearing/GI//diet, abdominal pain, numbness/tingling, or any other concerns.         PMHX:   Active Problems  List  Patient Active Problem List   Diagnosis     Chronic back pain     Alcohol dependence in remission (H)     Diabetes mellitus, type 2 (H)     Bipolar disorder (H)     Depression with anxiety     H/O tubal ligation     Hypothyroidism     GERD (gastroesophageal reflux disease)     Tobacco dependence syndrome     Irritable bowel syndrome     Displacement of lumbar intervertebral disc     Peptic ulcer     Depressed mood     Obesity     Essential hypertension     Pap smear abnormality of cervix with LGSIL     Lumbar degenerative disc disease     Chronic systolic congestive heart failure (H)     Nephrolithiasis     Nondependent cocaine abuse, episodic (H)     T wave inversion in EKG     Chest pain, unspecified type     CKD (chronic kidney disease) stage 5, GFR less than 15 ml/min (H)       Current Medications  Current Outpatient Medications   Medication Sig Dispense Refill     albuterol (PROAIR HFA/PROVENTIL HFA/VENTOLIN HFA) 108 (90 Base) MCG/ACT inhaler Inhale 2 puffs into the lungs every 6 hours as needed for shortness of breath / dyspnea or wheezing 1 Inhaler 1     carvedilol (COREG) 3.125 MG tablet Take 2 tablets (6.25 mg) by mouth 2 times daily (with meals) (Patient taking differently: Take 6.25 mg by mouth daily ) 120 tablet 3     furosemide (LASIX) 40 MG tablet Take 1 tablet (40 mg) by mouth daily 60 tablet 3     lisinopril (ZESTRIL) 5 MG tablet Take 3 tablets (15 mg) by mouth daily 90 tablet 4     metFORMIN (GLUCOPHAGE-XR) 500 MG 24 hr tablet Take 1 tablet (500 mg) by mouth daily (with dinner) 60 tablet 5     nitroGLYcerin (NITROSTAT) 0.4 MG sublingual tablet Place 1 tablet (0.4 mg) under the tongue every 5 minutes as needed for chest pain If you are still having symptoms after 3 doses (15 minutes) call 911. 25 tablet 3     omeprazole (PRILOSEC) 20 MG DR capsule Take 1 capsule (20 mg) by mouth daily 30 capsule 3     aspirin 81 MG chewable tablet Take 1 tablet (81 mg) by mouth daily (Patient not taking:  "Reported on 9/14/2021) 108 tablet 3     cyclobenzaprine (FLEXERIL) 5 MG tablet Take 1-2 tablets (5-10 mg) by mouth 2 times daily as needed for muscle spasms (Patient not taking: Reported on 9/14/2021) 30 tablet 0     STATIN NOT PRESCRIBED, INTENTIONAL, Statin not prescribed intentionally due to Other LDL wnl, ASCVD <7.5% (This option does not exclude patient from measure) (Patient not taking: Reported on 5/6/2021) 0 each 0       Social History  Social History     Tobacco Use     Smoking status: Current Every Day Smoker     Packs/day: 0.50     Types: Cigarettes     Smokeless tobacco: Never Used     Tobacco comment: Declined CIQ, not ready to quit. smoking about 10 a day   Vaping Use     Vaping Use: Never used   Substance Use Topics     Alcohol use: No     Alcohol/week: 0.0 standard drinks     Drug use: No     History   Drug Use No       Family History  Family History   Problem Relation Age of Onset     Other Cancer Father         Oral     Hypertension Father      Hypertension Maternal Grandmother      Diabetes Other         Paternal Uncle     Hypertension Other         Paternal Uncle     Asthma No family hx of      Heart Disease Mother      Cancer Mother         uterine     Urolithiasis Paternal Uncle      Diabetes Maternal Grandfather      Urolithiasis Paternal Uncle      Clotting Disorder No family hx of      Gout No family hx of        Allergies  Allergies   Allergen Reactions     Nkda [No Known Drug Allergies]             Physical Exam:     Vitals:    09/14/21 1139   BP: 120/79   BP Location: Right arm   Patient Position: Sitting   Cuff Size: Adult Regular   Pulse: 80   Resp: 20   Temp: 97.4  F (36.3  C)   TempSrc: Oral   SpO2: 99%   Weight: 84.4 kg (186 lb)   Height: 1.59 m (5' 2.6\")     Body mass index is 33.37 kg/m .    GENERAL APPEARANCE: alert, appears stated age, no acute distress  HEENT: Eyes grossly normal to inspection, nares normal, and mouth and throat without erythema, ulcers, or lesions  RESP: lungs " clear to auscultation - no rales, rhonchi, or wheezes  CV: regular rate and rhythm, no murmur, click, rub, or gallop  ABDOMEN: soft, nontender   MSK: extremities normal, no gross deformities noted, no lower extremity edema. Rib cage mild ttp.  SKIN: no suspicious lesions or rashes   NEURO: Normal strength and tone, sensory exam grossly normal, mentation appears intact and speech normal  PSYCH: mood and affect normal

## 2021-09-14 NOTE — PROGRESS NOTES
Preceptor Attestation:   Patient seen, evaluated and discussed with the resident. I have verified the content of the note, which accurately reflects my assessment of the patient and the plan of care.  Supervising Physician:Robyn Mckeon MD  [facility]

## 2021-09-15 ENCOUNTER — TELEPHONE (OUTPATIENT)
Dept: FAMILY MEDICINE | Facility: CLINIC | Age: 51
End: 2021-09-15

## 2021-09-15 NOTE — TELEPHONE ENCOUNTER
SW called patient this date to provide additional information for finding housing this date. Patient's daughter answered phone and [facility]    SW to provide patient with phone number for [facility] Entry: [phone]. Patient to complete assessment and obtain referral to [employer] Navigation Services to be connected to resources for Rapid Rehousing, etc.     ODELL KELLY, KIRA, [facility]

## 2021-09-16 ENCOUNTER — TELEPHONE (OUTPATIENT)
Dept: FAMILY MEDICINE | Facility: CLINIC | Age: 51
End: 2021-09-16
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
## 2022-11-25 ENCOUNTER — APPOINTMENT (OUTPATIENT)
Dept: RADIOLOGY | Facility: HOSPITAL | Age: 52
End: 2022-11-25
Attending: EMERGENCY MEDICINE
Payer: COMMERCIAL

## 2022-11-25 ENCOUNTER — HOSPITAL ENCOUNTER (EMERGENCY)
Facility: HOSPITAL | Age: 52
Discharge: HOME | End: 2022-11-25
Admitting: EMERGENCY MEDICINE
Payer: COMMERCIAL

## 2022-11-25 VITALS
RESPIRATION RATE: 24 BRPM | HEART RATE: 109 BPM | OXYGEN SATURATION: 93 % | HEIGHT: 62 IN | SYSTOLIC BLOOD PRESSURE: 215 MMHG | TEMPERATURE: 97.4 F | BODY MASS INDEX: 32.2 KG/M2 | WEIGHT: 175 LBS | DIASTOLIC BLOOD PRESSURE: 121 MMHG

## 2022-11-25 PROCEDURE — 71046 X-RAY EXAM CHEST 2 VIEWS: CPT

## 2022-11-25 PROCEDURE — 93005 ELECTROCARDIOGRAM TRACING: CPT | Performed by: EMERGENCY MEDICINE

## 2022-11-25 PROCEDURE — 99284 EMERGENCY DEPT VISIT MOD MDM: CPT | Mod: 25

## 2022-11-25 RX ORDER — MAGNESIUM SULFATE HEPTAHYDRATE 40 MG/ML
2 INJECTION, SOLUTION INTRAVENOUS ONCE
Status: DISCONTINUED | OUTPATIENT
Start: 2022-11-25 | End: 2022-11-25 | Stop reason: HOSPADM

## 2022-11-25 RX ORDER — METHYLPREDNISOLONE SODIUM SUCCINATE 125 MG/2ML
125 INJECTION, POWDER, LYOPHILIZED, FOR SOLUTION INTRAMUSCULAR; INTRAVENOUS ONCE
Status: DISCONTINUED | OUTPATIENT
Start: 2022-11-25 | End: 2022-11-25 | Stop reason: HOSPADM

## 2022-11-25 RX ORDER — IPRATROPIUM BROMIDE AND ALBUTEROL SULFATE 2.5; .5 MG/3ML; MG/3ML
3 SOLUTION RESPIRATORY (INHALATION) ONCE
Status: DISCONTINUED | OUTPATIENT
Start: 2022-11-25 | End: 2022-11-25 | Stop reason: HOSPADM

## 2022-11-25 NOTE — ED TRIAGE NOTES
amb to triage.  Pt states having continued cough and sob since d/c'd from [facility] week.  Coughing up brown now.  Pt reports bilat rib pain.  Denies CP     Triage Assessment       Row Name 11/25/22 4629       Triage Assessment (Adult)    Airway WDL WDL       Respiratory WDL    Respiratory WDL X;cough    Cough Frequency frequent    Cough Type productive       Skin Circulation/Temperature WDL    Skin Circulation/Temperature WDL WDL       Cardiac WDL    Cardiac WDL WDL       Peripheral/Neurovascular WDL    Peripheral Neurovascular WDL WDL       Cognitive/Neuro/Behavioral WDL    Cognitive/Neuro/Behavioral WDL WDL

## 2022-11-25 NOTE — ED NOTES
"ED Provider In Triage Note  [facility]  Encounter Date: Nov 25, 2022    Chief Complaint   Patient presents with     Cough     Shortness of Breath       Brief HPI:   Lola Arias is a 52 year old female presenting to the Emergency Department with a chief complaint of cough, sob. 3 weeks of sx. Seen at [place] for this. Has not been smoking with new illness.     Sputum now dark brown. Low grade fever of .    emr shows she was admitted for hypertensive emergency. ECG abnormal, but had normal cath.     Brief Physical Exam:  BP (!) 215/121   Pulse 109   Temp 97.4  F (36.3  C) (Temporal)   Resp 24   Ht 1.575 m (5' 2\")   Wt 79.4 kg (175 lb)   SpO2 93%   BMI 32.01 kg/m    General: Non-toxic appearing  HEENT: Atraumatic  Resp: No respiratory distress. Frequent tight cough.  Abdomen: Non-peritoneal  Neuro: Alert, oriented, answers questions appropriately  Psych: Behavior appropriate    Plan Initiated in Triage:  Ddx includes: eval for pneumonia, viral illness, tx asthma/copd. Recent hypertensive emergency at [place]. Clean cath.    PIT Dispo:   Return to lobby while awaiting workup and ED bed availability    Zachary Morrison MD on 11/25/2022 at 1:07 PM    Patient was evaluated by the Physician in Triage due to a limitation of available rooms in the Emergency Department. A plan of care was discussed based on the information obtained on the initial evaluation and patient was consuled to return back to the Emergency Department lobby after this initial evalutaiton until results were obtained or a room became available in the Emergency Department. Patient was counseled not to leave prior to receiving the results of their workup.     Zachary Morrison MD  [facility] EMERGENCY DEPARTMENT  [address] [place] [id number]  [phone]     Zachary Morrison MD  11/25/22 1314    "

## 2023-02-16 ENCOUNTER — HOSPITAL ENCOUNTER (EMERGENCY)
Facility: CLINIC | Age: 53
Discharge: HOME | End: 2023-02-16
Attending: STUDENT IN AN ORGANIZED HEALTH CARE EDUCATION/TRAINING PROGRAM | Admitting: STUDENT IN AN ORGANIZED HEALTH CARE EDUCATION/TRAINING PROGRAM
Payer: COMMERCIAL

## 2023-02-16 ENCOUNTER — APPOINTMENT (OUTPATIENT)
Dept: RADIOLOGY | Facility: CLINIC | Age: 53
End: 2023-02-16
Attending: EMERGENCY MEDICINE
Payer: COMMERCIAL

## 2023-02-16 VITALS
TEMPERATURE: 98.1 F | SYSTOLIC BLOOD PRESSURE: 172 MMHG | RESPIRATION RATE: 20 BRPM | OXYGEN SATURATION: 100 % | DIASTOLIC BLOOD PRESSURE: 96 MMHG | HEART RATE: 80 BPM

## 2023-02-16 DIAGNOSIS — S63.501A SPRAIN OF RIGHT WRIST, INITIAL ENCOUNTER: Primary | ICD-10-CM
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
## 2023-02-16 DIAGNOSIS — M79.631 PAIN OF RIGHT FOREARM: ICD-10-CM

## 2023-02-16 LAB
ANION GAP SERPL CALCULATED.3IONS-SCNC: 9 MMOL/L (ref 5–18)
BASOPHILS # BLD AUTO: 0.1 10E3/UL (ref 0–0.2)
BASOPHILS NFR BLD AUTO: 1 %
BUN SERPL-MCNC: 12 MG/DL (ref 8–22)
CALCIUM SERPL-MCNC: 9.2 MG/DL (ref 8.5–10.5)
CHLORIDE BLD-SCNC: 104 MMOL/L (ref 98–107)
CO2 SERPL-SCNC: 22 MMOL/L (ref 22–31)
CREAT SERPL-MCNC: 0.97 MG/DL (ref 0.6–1.1)
EOSINOPHIL # BLD AUTO: 0.1 10E3/UL (ref 0–0.7)
EOSINOPHIL NFR BLD AUTO: 1 %
ERYTHROCYTE [DISTWIDTH] IN BLOOD BY AUTOMATED COUNT: 12.1 % (ref 10–15)
GFR SERPL CREATININE-BSD FRML MDRD: 70 ML/MIN/1.73M2
GLUCOSE BLD-MCNC: 292 MG/DL (ref 70–125)
HCT VFR BLD AUTO: 34.1 % (ref 35–47)
HGB BLD-MCNC: 11.6 G/DL (ref 11.7–15.7)
IMM GRANULOCYTES # BLD: 0 10E3/UL
IMM GRANULOCYTES NFR BLD: 0 %
LYMPHOCYTES # BLD AUTO: 2.5 10E3/UL (ref 0.8–5.3)
LYMPHOCYTES NFR BLD AUTO: 30 %
MCH RBC QN AUTO: 32.8 PG (ref 26.5–33)
MCHC RBC AUTO-ENTMCNC: 34 G/DL (ref 31.5–36.5)
MCV RBC AUTO: 96 FL (ref 78–100)
MONOCYTES # BLD AUTO: 0.8 10E3/UL (ref 0–1.3)
MONOCYTES NFR BLD AUTO: 9 %
NEUTROPHILS # BLD AUTO: 5.1 10E3/UL (ref 1.6–8.3)
NEUTROPHILS NFR BLD AUTO: 59 %
NRBC # BLD AUTO: 0 10E3/UL
NRBC BLD AUTO-RTO: 0 /100
PLATELET # BLD AUTO: 213 10E3/UL (ref 150–450)
POTASSIUM BLD-SCNC: 4.2 MMOL/L (ref 3.5–5)
RBC # BLD AUTO: 3.54 10E6/UL (ref 3.8–5.2)
SODIUM SERPL-SCNC: 135 MMOL/L (ref 136–145)
TROPONIN I SERPL-MCNC: <0.01 NG/ML (ref 0–0.29)
WBC # BLD AUTO: 8.5 10E3/UL (ref 4–11)

## 2023-02-16 PROCEDURE — 85025 COMPLETE CBC W/AUTO DIFF WBC: CPT | Performed by: STUDENT IN AN ORGANIZED HEALTH CARE EDUCATION/TRAINING PROGRAM

## 2023-02-16 PROCEDURE — 96374 THER/PROPH/DIAG INJ IV PUSH: CPT

## 2023-02-16 PROCEDURE — 73080 X-RAY EXAM OF ELBOW: CPT | Mod: RT

## 2023-02-16 PROCEDURE — 93005 ELECTROCARDIOGRAM TRACING: CPT | Performed by: STUDENT IN AN ORGANIZED HEALTH CARE EDUCATION/TRAINING PROGRAM

## 2023-02-16 PROCEDURE — 84484 ASSAY OF TROPONIN QUANT: CPT | Performed by: STUDENT IN AN ORGANIZED HEALTH CARE EDUCATION/TRAINING PROGRAM

## 2023-02-16 PROCEDURE — 73090 X-RAY EXAM OF FOREARM: CPT | Mod: RT

## 2023-02-16 PROCEDURE — 36415 COLL VENOUS BLD VENIPUNCTURE: CPT | Performed by: STUDENT IN AN ORGANIZED HEALTH CARE EDUCATION/TRAINING PROGRAM

## 2023-02-16 PROCEDURE — 73110 X-RAY EXAM OF WRIST: CPT | Mod: RT

## 2023-02-16 PROCEDURE — 99285 EMERGENCY DEPT VISIT HI MDM: CPT | Mod: 25

## 2023-02-16 PROCEDURE — 250N000011 HC RX IP 250 OP 636: Performed by: STUDENT IN AN ORGANIZED HEALTH CARE EDUCATION/TRAINING PROGRAM

## 2023-02-16 PROCEDURE — 29125 APPL SHORT ARM SPLINT STATIC: CPT | Mod: RT

## 2023-02-16 PROCEDURE — 80048 BASIC METABOLIC PNL TOTAL CA: CPT | Performed by: STUDENT IN AN ORGANIZED HEALTH CARE EDUCATION/TRAINING PROGRAM

## 2023-02-16 RX ORDER — MORPHINE SULFATE 4 MG/ML
4 INJECTION, SOLUTION INTRAMUSCULAR; INTRAVENOUS ONCE
Status: COMPLETED | OUTPATIENT
Start: 2023-02-16 | End: 2023-02-16

## 2023-02-16 RX ADMIN — MORPHINE SULFATE 4 MG: 4 INJECTION, SOLUTION INTRAMUSCULAR; INTRAVENOUS at 16:53

## 2023-02-16 ASSESSMENT — ACTIVITIES OF DAILY LIVING (ADL)
ADLS_ACUITY_SCORE: 35
ADLS_ACUITY_SCORE: 35

## 2023-02-16 NOTE — Clinical Note
Lola Arias was seen and treated in our emergency department on 2/16/2023.  She may return to work on 02/20/2023.       If you have any questions or concerns, please don't hesitate to call.      Giuseppe Cannon MD

## 2023-02-16 NOTE — ED TRIAGE NOTES
Pt comes from urgent care by EMS. Pt had syncopal episode last night at 2200 and injured her right arm. She has history of syncope and and strokes, CMS is intact per EMS. 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 Hx HTN. Did get fentanyl by EMS and 500 bolus.     Triage Assessment     Row Name 02/16/23 7521       Triage Assessment (Adult)    Airway WDL WDL       Respiratory WDL    Respiratory WDL WDL       Skin Circulation/Temperature WDL    Skin Circulation/Temperature WDL X;circulation    Skin Circulation pallor       Cardiac WDL    Cardiac WDL WDL       Peripheral/Neurovascular WDL    Peripheral Neurovascular WDL X;capillary refill    Capillary Refill, RUE greater than 3 secs       Cognitive/Neuro/Behavioral WDL    Cognitive/Neuro/Behavioral WDL WDL
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
## 2023-02-17 LAB
ATRIAL RATE - MUSE: 75 BPM
DIASTOLIC BLOOD PRESSURE - MUSE: NORMAL MMHG
INTERPRETATION ECG - MUSE: NORMAL
P AXIS - MUSE: 37 DEGREES
PR INTERVAL - MUSE: 168 MS
QRS DURATION - MUSE: 106 MS
QT - MUSE: 392 MS
QTC - MUSE: 437 MS
R AXIS - MUSE: -30 DEGREES
SYSTOLIC BLOOD PRESSURE - MUSE: NORMAL MMHG
T AXIS - MUSE: -10 DEGREES
VENTRICULAR RATE- MUSE: 75 BPM

## 2023-02-17 NOTE — ED NOTES
"Splint in place. Pt has no further questions. Asked the pt if she would do discharge vitals, she stated \"why do I need to do that\"; pt refused discharge vital signs. Alert and orientated x3.   "

## 2023-02-17 NOTE — DISCHARGE INSTRUCTIONS
For your pain we recommend trying;  Tylenol 1,000mg every 6 hours (as needed), up to 4,000mg/day  Ibuprofen 600 to 800mg every 6 hours (as needed), up to 3,200mg/day

## 2023-04-03 ENCOUNTER — HOSPITAL ENCOUNTER (INPATIENT)
Facility: CLINIC | Age: 53
LOS: 3 days | Discharge: HOME | DRG: 190 | End: 2023-04-07
Attending: FAMILY MEDICINE | Admitting: INTERNAL MEDICINE
Payer: COMMERCIAL

## 2023-04-03 ENCOUNTER — APPOINTMENT (OUTPATIENT)
Dept: RADIOLOGY | Facility: CLINIC | Age: 53
DRG: 190 | End: 2023-04-03
Attending: EMERGENCY MEDICINE
Payer: COMMERCIAL

## 2023-04-03 ENCOUNTER — APPOINTMENT (OUTPATIENT)
Dept: CT IMAGING | Facility: CLINIC | Age: 53
DRG: 190 | End: 2023-04-03
Attending: FAMILY MEDICINE
Payer: COMMERCIAL

## 2023-04-03 DIAGNOSIS — J20.9 ACUTE BRONCHITIS, UNSPECIFIED ORGANISM: ICD-10-CM

## 2023-04-03 DIAGNOSIS — J44.9 CHRONIC OBSTRUCTIVE PULMONARY DISEASE, UNSPECIFIED COPD TYPE (H): ICD-10-CM

## 2023-04-03 DIAGNOSIS — I1A.0 RESISTANT HYPERTENSION: ICD-10-CM

## 2023-04-03 DIAGNOSIS — Z72.0 TOBACCO ABUSE: ICD-10-CM

## 2023-04-03 DIAGNOSIS — J44.1 COPD EXACERBATION (H): ICD-10-CM

## 2023-04-03 DIAGNOSIS — E83.42 HYPOMAGNESEMIA: ICD-10-CM

## 2023-04-03 DIAGNOSIS — J18.9 RECURRENT PNEUMONIA: ICD-10-CM

## 2023-04-03 LAB
ALBUMIN SERPL-MCNC: 4 G/DL (ref 3.5–5)
ALP SERPL-CCNC: 87 U/L (ref 45–120)
ALT SERPL W P-5'-P-CCNC: 19 U/L (ref 0–45)
ANION GAP SERPL CALCULATED.3IONS-SCNC: 11 MMOL/L (ref 5–18)
AST SERPL W P-5'-P-CCNC: 19 U/L (ref 0–40)
BASE EXCESS BLDV CALC-SCNC: 0.5 MMOL/L
BASOPHILS # BLD AUTO: 0 10E3/UL (ref 0–0.2)
BASOPHILS NFR BLD AUTO: 1 %
BILIRUB SERPL-MCNC: 0.9 MG/DL (ref 0–1)
BNP SERPL-MCNC: 31 PG/ML (ref 0–74)
BUN SERPL-MCNC: 15 MG/DL (ref 8–22)
CALCIUM SERPL-MCNC: 9.7 MG/DL (ref 8.5–10.5)
CHLORIDE BLD-SCNC: 102 MMOL/L (ref 98–107)
CO2 SERPL-SCNC: 21 MMOL/L (ref 22–31)
CREAT SERPL-MCNC: 1.09 MG/DL (ref 0.6–1.1)
D DIMER PPP FEU-MCNC: 1.34 UG/ML FEU (ref 0–0.5)
EOSINOPHIL # BLD AUTO: 0.1 10E3/UL (ref 0–0.7)
EOSINOPHIL NFR BLD AUTO: 1 %
ERYTHROCYTE [DISTWIDTH] IN BLOOD BY AUTOMATED COUNT: 12.1 % (ref 10–15)
FLUAV RNA SPEC QL NAA+PROBE: NEGATIVE
FLUBV RNA RESP QL NAA+PROBE: NEGATIVE
GFR SERPL CREATININE-BSD FRML MDRD: 61 ML/MIN/1.73M2
GLUCOSE BLD-MCNC: 138 MG/DL (ref 70–125)
HCO3 BLDV-SCNC: 26 MMOL/L (ref 24–30)
HCT VFR BLD AUTO: 34.3 % (ref 35–47)
HGB BLD-MCNC: 12.2 G/DL (ref 11.7–15.7)
IMM GRANULOCYTES # BLD: 0 10E3/UL
IMM GRANULOCYTES NFR BLD: 0 %
LACTATE SERPL-SCNC: 0.8 MMOL/L (ref 0.7–2)
LYMPHOCYTES # BLD AUTO: 1.1 10E3/UL (ref 0.8–5.3)
LYMPHOCYTES NFR BLD AUTO: 18 %
MAGNESIUM SERPL-MCNC: 1.1 MG/DL (ref 1.8–2.6)
MCH RBC QN AUTO: 32.9 PG (ref 26.5–33)
MCHC RBC AUTO-ENTMCNC: 35.6 G/DL (ref 31.5–36.5)
MCV RBC AUTO: 93 FL (ref 78–100)
MONOCYTES # BLD AUTO: 0.7 10E3/UL (ref 0–1.3)
MONOCYTES NFR BLD AUTO: 12 %
NEUTROPHILS # BLD AUTO: 3.9 10E3/UL (ref 1.6–8.3)
NEUTROPHILS NFR BLD AUTO: 68 %
NRBC # BLD AUTO: 0 10E3/UL
NRBC BLD AUTO-RTO: 0 /100
OXYHGB MFR BLDV: 28.2 % (ref 70–75)
PCO2 BLDV: 49 MM HG (ref 35–50)
PH BLDV: 7.34 [PH] (ref 7.35–7.45)
PLATELET # BLD AUTO: 221 10E3/UL (ref 150–450)
PO2 BLDV: 20 MM HG (ref 25–47)
POTASSIUM BLD-SCNC: 4.4 MMOL/L (ref 3.5–5)
PROT SERPL-MCNC: 7.8 G/DL (ref 6–8)
RBC # BLD AUTO: 3.71 10E6/UL (ref 3.8–5.2)
RSV RNA SPEC NAA+PROBE: NEGATIVE
SAO2 % BLDV: 28.8 % (ref 70–75)
SARS-COV-2 RNA RESP QL NAA+PROBE: NEGATIVE
SODIUM SERPL-SCNC: 134 MMOL/L (ref 136–145)
TROPONIN I SERPL-MCNC: <0.01 NG/ML (ref 0–0.29)
WBC # BLD AUTO: 5.8 10E3/UL (ref 4–11)

## 2023-04-03 PROCEDURE — 99285 EMERGENCY DEPT VISIT HI MDM: CPT | Mod: CS,25

## 2023-04-03 PROCEDURE — 36415 COLL VENOUS BLD VENIPUNCTURE: CPT | Performed by: FAMILY MEDICINE

## 2023-04-03 PROCEDURE — 80053 COMPREHEN METABOLIC PANEL: CPT | Performed by: EMERGENCY MEDICINE

## 2023-04-03 PROCEDURE — 94660 CPAP INITIATION&MGMT: CPT

## 2023-04-03 PROCEDURE — 94640 AIRWAY INHALATION TREATMENT: CPT

## 2023-04-03 PROCEDURE — 93005 ELECTROCARDIOGRAM TRACING: CPT | Performed by: FAMILY MEDICINE

## 2023-04-03 PROCEDURE — 250N000009 HC RX 250: Performed by: FAMILY MEDICINE

## 2023-04-03 PROCEDURE — 96367 TX/PROPH/DG ADDL SEQ IV INF: CPT

## 2023-04-03 PROCEDURE — 96375 TX/PRO/DX INJ NEW DRUG ADDON: CPT

## 2023-04-03 PROCEDURE — 250N000011 HC RX IP 250 OP 636: Performed by: FAMILY MEDICINE

## 2023-04-03 PROCEDURE — 71046 X-RAY EXAM CHEST 2 VIEWS: CPT

## 2023-04-03 PROCEDURE — 84484 ASSAY OF TROPONIN QUANT: CPT | Performed by: EMERGENCY MEDICINE

## 2023-04-03 PROCEDURE — 999N000157 HC STATISTIC RCP TIME EA 10 MIN

## 2023-04-03 PROCEDURE — 71275 CT ANGIOGRAPHY CHEST: CPT

## 2023-04-03 PROCEDURE — 258N000003 HC RX IP 258 OP 636: Performed by: FAMILY MEDICINE

## 2023-04-03 PROCEDURE — 87040 BLOOD CULTURE FOR BACTERIA: CPT | Performed by: FAMILY MEDICINE

## 2023-04-03 PROCEDURE — 94644 CONT INHLJ TX 1ST HOUR: CPT

## 2023-04-03 PROCEDURE — 87637 SARSCOV2&INF A&B&RSV AMP PRB: CPT | Performed by: EMERGENCY MEDICINE

## 2023-04-03 PROCEDURE — 85379 FIBRIN DEGRADATION QUANT: CPT | Performed by: EMERGENCY MEDICINE

## 2023-04-03 PROCEDURE — 83880 ASSAY OF NATRIURETIC PEPTIDE: CPT | Performed by: EMERGENCY MEDICINE

## 2023-04-03 PROCEDURE — 83735 ASSAY OF MAGNESIUM: CPT | Performed by: FAMILY MEDICINE

## 2023-04-03 PROCEDURE — 85025 COMPLETE CBC W/AUTO DIFF WBC: CPT | Performed by: EMERGENCY MEDICINE

## 2023-04-03 PROCEDURE — 36415 COLL VENOUS BLD VENIPUNCTURE: CPT | Performed by: EMERGENCY MEDICINE

## 2023-04-03 PROCEDURE — 96365 THER/PROPH/DIAG IV INF INIT: CPT | Mod: 59

## 2023-04-03 PROCEDURE — C9803 HOPD COVID-19 SPEC COLLECT: HCPCS

## 2023-04-03 PROCEDURE — 82805 BLOOD GASES W/O2 SATURATION: CPT | Performed by: FAMILY MEDICINE

## 2023-04-03 PROCEDURE — 5A09357 ASSISTANCE WITH RESPIRATORY VENTILATION, LESS THAN 24 CONSECUTIVE HOURS, CONTINUOUS POSITIVE AIRWAY PRESSURE: ICD-10-PCS | Performed by: FAMILY MEDICINE

## 2023-04-03 PROCEDURE — 83605 ASSAY OF LACTIC ACID: CPT | Performed by: FAMILY MEDICINE

## 2023-04-03 RX ORDER — IOPAMIDOL 755 MG/ML
100 INJECTION, SOLUTION INTRAVASCULAR ONCE
Status: COMPLETED | OUTPATIENT
Start: 2023-04-03 | End: 2023-04-03

## 2023-04-03 RX ORDER — MAGNESIUM SULFATE HEPTAHYDRATE 40 MG/ML
2 INJECTION, SOLUTION INTRAVENOUS ONCE
Status: COMPLETED | OUTPATIENT
Start: 2023-04-03 | End: 2023-04-03

## 2023-04-03 RX ORDER — LEVALBUTEROL INHALATION SOLUTION 1.25 MG/3ML
1.25 SOLUTION RESPIRATORY (INHALATION) ONCE
Status: COMPLETED | OUTPATIENT
Start: 2023-04-03 | End: 2023-04-03

## 2023-04-03 RX ORDER — CEFTRIAXONE 2 G/1
2 INJECTION, POWDER, FOR SOLUTION INTRAMUSCULAR; INTRAVENOUS ONCE
Status: COMPLETED | OUTPATIENT
Start: 2023-04-03 | End: 2023-04-03

## 2023-04-03 RX ORDER — METHYLPREDNISOLONE SODIUM SUCCINATE 125 MG/2ML
125 INJECTION, POWDER, LYOPHILIZED, FOR SOLUTION INTRAMUSCULAR; INTRAVENOUS ONCE
Status: COMPLETED | OUTPATIENT
Start: 2023-04-03 | End: 2023-04-03

## 2023-04-03 RX ORDER — AZITHROMYCIN 500 MG/5ML
500 INJECTION, POWDER, LYOPHILIZED, FOR SOLUTION INTRAVENOUS ONCE
Status: COMPLETED | OUTPATIENT
Start: 2023-04-03 | End: 2023-04-04

## 2023-04-03 RX ORDER — LEVALBUTEROL INHALATION SOLUTION 1.25 MG/3ML
5 SOLUTION RESPIRATORY (INHALATION) CONTINUOUS
Status: DISCONTINUED | OUTPATIENT
Start: 2023-04-03 | End: 2023-04-03

## 2023-04-03 RX ORDER — IPRATROPIUM BROMIDE AND ALBUTEROL SULFATE 2.5; .5 MG/3ML; MG/3ML
3 SOLUTION RESPIRATORY (INHALATION) ONCE
Status: DISCONTINUED | OUTPATIENT
Start: 2023-04-03 | End: 2023-04-03

## 2023-04-03 RX ADMIN — MAGNESIUM SULFATE HEPTAHYDRATE 2 G: 40 INJECTION, SOLUTION INTRAVENOUS at 22:57

## 2023-04-03 RX ADMIN — LEVALBUTEROL HYDROCHLORIDE 5 MG/HR: 1.25 SOLUTION RESPIRATORY (INHALATION) at 23:39

## 2023-04-03 RX ADMIN — LEVALBUTEROL HYDROCHLORIDE 1.25 MG: 1.25 SOLUTION RESPIRATORY (INHALATION) at 22:44

## 2023-04-03 RX ADMIN — CEFTRIAXONE SODIUM 2 G: 2 INJECTION, POWDER, FOR SOLUTION INTRAMUSCULAR; INTRAVENOUS at 22:58

## 2023-04-03 RX ADMIN — AZITHROMYCIN 500 MG: 500 INJECTION, POWDER, LYOPHILIZED, FOR SOLUTION INTRAVENOUS at 23:24

## 2023-04-03 RX ADMIN — IOPAMIDOL 100 ML: 755 INJECTION, SOLUTION INTRAVENOUS at 21:28

## 2023-04-03 RX ADMIN — IPRATROPIUM BROMIDE 0.5 MG: 0.5 SOLUTION RESPIRATORY (INHALATION) at 22:44

## 2023-04-03 RX ADMIN — METHYLPREDNISOLONE SODIUM SUCCINATE 125 MG: 125 INJECTION, POWDER, FOR SOLUTION INTRAMUSCULAR; INTRAVENOUS at 22:31

## 2023-04-03 ASSESSMENT — ACTIVITIES OF DAILY LIVING (ADL): ADLS_ACUITY_SCORE: 35

## 2023-04-03 NOTE — ED TRIAGE NOTES
Pt placed on 2L NC for comfort     Triage Assessment     Row Name 04/03/23 4599       Triage Assessment (Adult)    Airway WDL WDL       Respiratory WDL    Respiratory WDL rhythm/pattern    Rhythm/Pattern, Respiratory tachypneic       Skin Circulation/Temperature WDL    Skin Circulation/Temperature WDL WDL       Cardiac WDL    Cardiac WDL WDL       Peripheral/Neurovascular WDL    Peripheral Neurovascular WDL WDL       Cognitive/Neuro/Behavioral WDL    Cognitive/Neuro/Behavioral WDL WDL

## 2023-04-03 NOTE — ED TRIAGE NOTES
Started shortness of breath and cough since last night.  No history of asthma     Triage Assessment     Row Name 04/03/23 [id number]       Triage Assessment (Adult)    Airway WDL WDL       Respiratory WDL    Respiratory WDL rhythm/pattern    Rhythm/Pattern, Respiratory tachypneic       Skin Circulation/Temperature WDL    Skin Circulation/Temperature WDL WDL       Cardiac WDL    Cardiac WDL WDL       Peripheral/Neurovascular WDL    Peripheral Neurovascular WDL WDL       Cognitive/Neuro/Behavioral WDL    Cognitive/Neuro/Behavioral WDL WDL

## 2023-04-03 NOTE — LETTER
[address] [address]  Phone: [phone]  Fax: [phone]    April 12, 2023        Lola Arias  [address] [facility] [address]          To whom it may concern:    RE: Lola Arias    Please note patient was hospitalized from 4/3/2023 to 4/7/2023.  Okay to return to work on 4/10/2023.  Please contact primary care provider if any further questions.      Sincerely,        Conor Bhatt MD  4/12/2023  12:59 PM  [facility] Medicine Service  [phone]

## 2023-04-04 PROBLEM — E83.42 HYPOMAGNESEMIA: Status: ACTIVE | Noted: 2023-04-04

## 2023-04-04 PROBLEM — J18.9 RECURRENT PNEUMONIA: Status: ACTIVE | Noted: 2023-04-04

## 2023-04-04 PROBLEM — J20.9 ACUTE BRONCHITIS, UNSPECIFIED ORGANISM: Status: ACTIVE | Noted: 2023-04-04

## 2023-04-04 LAB
ALBUMIN SERPL-MCNC: 3.5 G/DL (ref 3.5–5)
ALP SERPL-CCNC: 77 U/L (ref 45–120)
ALT SERPL W P-5'-P-CCNC: 19 U/L (ref 0–45)
ANION GAP SERPL CALCULATED.3IONS-SCNC: 10 MMOL/L (ref 5–18)
AST SERPL W P-5'-P-CCNC: 16 U/L (ref 0–40)
ATRIAL RATE - MUSE: 95 BPM
BASE EXCESS BLDV CALC-SCNC: -3.7 MMOL/L
BASOPHILS # BLD AUTO: 0 10E3/UL (ref 0–0.2)
BASOPHILS NFR BLD AUTO: 1 %
BILIRUB SERPL-MCNC: 0.6 MG/DL (ref 0–1)
BUN SERPL-MCNC: 18 MG/DL (ref 8–22)
C PNEUM DNA SPEC QL NAA+PROBE: ABNORMAL
CALCIUM SERPL-MCNC: 9 MG/DL (ref 8.5–10.5)
CHLORIDE BLD-SCNC: 101 MMOL/L (ref 98–107)
CO2 SERPL-SCNC: 20 MMOL/L (ref 22–31)
CREAT SERPL-MCNC: 1.2 MG/DL (ref 0.6–1.1)
DIASTOLIC BLOOD PRESSURE - MUSE: 67 MMHG
EOSINOPHIL # BLD AUTO: 0 10E3/UL (ref 0–0.7)
EOSINOPHIL NFR BLD AUTO: 0 %
ERYTHROCYTE [DISTWIDTH] IN BLOOD BY AUTOMATED COUNT: 11.8 % (ref 10–15)
FLUAV H1 2009 PAND RNA SPEC QL NAA+PROBE: ABNORMAL
FLUAV H1 RNA SPEC QL NAA+PROBE: ABNORMAL
FLUAV H3 RNA SPEC QL NAA+PROBE: ABNORMAL
FLUAV RNA SPEC QL NAA+PROBE: ABNORMAL
FLUBV RNA SPEC QL NAA+PROBE: ABNORMAL
GFR SERPL CREATININE-BSD FRML MDRD: 55 ML/MIN/1.73M2
GLUCOSE BLD-MCNC: 333 MG/DL (ref 70–125)
GLUCOSE BLDC GLUCOMTR-MCNC: 306 MG/DL (ref 70–99)
GLUCOSE BLDC GLUCOMTR-MCNC: 339 MG/DL (ref 70–99)
GLUCOSE BLDC GLUCOMTR-MCNC: 385 MG/DL (ref 70–99)
GLUCOSE BLDC GLUCOMTR-MCNC: 389 MG/DL (ref 70–99)
HADV DNA SPEC QL NAA+PROBE: ABNORMAL
HCO3 BLDV-SCNC: 22 MMOL/L (ref 24–30)
HCOV PNL SPEC NAA+PROBE: ABNORMAL
HCT VFR BLD AUTO: 31.7 % (ref 35–47)
HGB BLD-MCNC: 10.8 G/DL (ref 11.7–15.7)
HMPV RNA SPEC QL NAA+PROBE: ABNORMAL
HPIV1 RNA SPEC QL NAA+PROBE: ABNORMAL
HPIV2 RNA SPEC QL NAA+PROBE: ABNORMAL
HPIV3 RNA SPEC QL NAA+PROBE: ABNORMAL
HPIV4 RNA SPEC QL NAA+PROBE: ABNORMAL
IMM GRANULOCYTES # BLD: 0 10E3/UL
IMM GRANULOCYTES NFR BLD: 0 %
INTERPRETATION ECG - MUSE: NORMAL
L PNEUMO1 AG UR QL IA: NEGATIVE
LYMPHOCYTES # BLD AUTO: 0.5 10E3/UL (ref 0.8–5.3)
LYMPHOCYTES NFR BLD AUTO: 15 %
M PNEUMO DNA SPEC QL NAA+PROBE: ABNORMAL
MAGNESIUM SERPL-MCNC: 2.3 MG/DL (ref 1.8–2.6)
MCH RBC QN AUTO: 32.4 PG (ref 26.5–33)
MCHC RBC AUTO-ENTMCNC: 34.1 G/DL (ref 31.5–36.5)
MCV RBC AUTO: 95 FL (ref 78–100)
MONOCYTES # BLD AUTO: 0.1 10E3/UL (ref 0–1.3)
MONOCYTES NFR BLD AUTO: 2 %
NEUTROPHILS # BLD AUTO: 3.1 10E3/UL (ref 1.6–8.3)
NEUTROPHILS NFR BLD AUTO: 82 %
NRBC # BLD AUTO: 0 10E3/UL
NRBC BLD AUTO-RTO: 0 /100
OXYHGB MFR BLDV: 80.3 % (ref 70–75)
P AXIS - MUSE: 31 DEGREES
PCO2 BLDV: 44 MM HG (ref 35–50)
PH BLDV: 7.32 [PH] (ref 7.35–7.45)
PLATELET # BLD AUTO: 172 10E3/UL (ref 150–450)
PO2 BLDV: 49 MM HG (ref 25–47)
POTASSIUM BLD-SCNC: 4.2 MMOL/L (ref 3.5–5)
PR INTERVAL - MUSE: 170 MS
PROT SERPL-MCNC: 7.1 G/DL (ref 6–8)
QRS DURATION - MUSE: 96 MS
QT - MUSE: 348 MS
QTC - MUSE: 437 MS
R AXIS - MUSE: -26 DEGREES
RBC # BLD AUTO: 3.33 10E6/UL (ref 3.8–5.2)
RSV RNA SPEC QL NAA+PROBE: ABNORMAL
RSV RNA SPEC QL NAA+PROBE: ABNORMAL
RV+EV RNA SPEC QL NAA+PROBE: ABNORMAL
S PNEUM AG SPEC QL: NEGATIVE
SAO2 % BLDV: 81.9 % (ref 70–75)
SODIUM SERPL-SCNC: 131 MMOL/L (ref 136–145)
SYSTOLIC BLOOD PRESSURE - MUSE: 135 MMHG
T AXIS - MUSE: 5 DEGREES
TROPONIN I SERPL-MCNC: 0.01 NG/ML (ref 0–0.29)
VENTRICULAR RATE- MUSE: 95 BPM
WBC # BLD AUTO: 3.7 10E3/UL (ref 4–11)

## 2023-04-04 PROCEDURE — 80053 COMPREHEN METABOLIC PANEL: CPT | Performed by: INTERNAL MEDICINE

## 2023-04-04 PROCEDURE — 84155 ASSAY OF PROTEIN SERUM: CPT | Performed by: INTERNAL MEDICINE

## 2023-04-04 PROCEDURE — 87581 M.PNEUMON DNA AMP PROBE: CPT | Performed by: INTERNAL MEDICINE

## 2023-04-04 PROCEDURE — 250N000011 HC RX IP 250 OP 636: Performed by: FAMILY MEDICINE

## 2023-04-04 PROCEDURE — 87899 AGENT NOS ASSAY W/OPTIC: CPT | Performed by: INTERNAL MEDICINE

## 2023-04-04 PROCEDURE — 36415 COLL VENOUS BLD VENIPUNCTURE: CPT | Performed by: INTERNAL MEDICINE

## 2023-04-04 PROCEDURE — 250N000009 HC RX 250: Performed by: INTERNAL MEDICINE

## 2023-04-04 PROCEDURE — 120N000001 HC R&B MED SURG/OB

## 2023-04-04 PROCEDURE — 83880 ASSAY OF NATRIURETIC PEPTIDE: CPT | Performed by: INTERNAL MEDICINE

## 2023-04-04 PROCEDURE — 96366 THER/PROPH/DIAG IV INF ADDON: CPT

## 2023-04-04 PROCEDURE — 999N000157 HC STATISTIC RCP TIME EA 10 MIN

## 2023-04-04 PROCEDURE — 250N000011 HC RX IP 250 OP 636: Performed by: INTERNAL MEDICINE

## 2023-04-04 PROCEDURE — 85025 COMPLETE CBC W/AUTO DIFF WBC: CPT | Performed by: INTERNAL MEDICINE

## 2023-04-04 PROCEDURE — 250N000013 HC RX MED GY IP 250 OP 250 PS 637: Performed by: INTERNAL MEDICINE

## 2023-04-04 PROCEDURE — 99232 SBSQ HOSP IP/OBS MODERATE 35: CPT | Performed by: INTERNAL MEDICINE

## 2023-04-04 PROCEDURE — 250N000012 HC RX MED GY IP 250 OP 636 PS 637: Performed by: INTERNAL MEDICINE

## 2023-04-04 PROCEDURE — 84484 ASSAY OF TROPONIN QUANT: CPT | Performed by: INTERNAL MEDICINE

## 2023-04-04 PROCEDURE — 94640 AIRWAY INHALATION TREATMENT: CPT

## 2023-04-04 PROCEDURE — 94640 AIRWAY INHALATION TREATMENT: CPT | Mod: 76

## 2023-04-04 PROCEDURE — 82805 BLOOD GASES W/O2 SATURATION: CPT | Performed by: INTERNAL MEDICINE

## 2023-04-04 PROCEDURE — 83735 ASSAY OF MAGNESIUM: CPT | Performed by: INTERNAL MEDICINE

## 2023-04-04 PROCEDURE — 250N000009 HC RX 250: Performed by: FAMILY MEDICINE

## 2023-04-04 PROCEDURE — 99223 1ST HOSP IP/OBS HIGH 75: CPT | Mod: AI | Performed by: INTERNAL MEDICINE

## 2023-04-04 PROCEDURE — 83605 ASSAY OF LACTIC ACID: CPT | Performed by: INTERNAL MEDICINE

## 2023-04-04 PROCEDURE — 82962 GLUCOSE BLOOD TEST: CPT

## 2023-04-04 PROCEDURE — 258N000003 HC RX IP 258 OP 636: Performed by: INTERNAL MEDICINE

## 2023-04-04 RX ORDER — BENZONATATE 100 MG/1
100 CAPSULE ORAL 3 TIMES DAILY PRN
Status: DISCONTINUED | OUTPATIENT
Start: 2023-04-04 | End: 2023-04-07 | Stop reason: HOSPADM

## 2023-04-04 RX ORDER — NICOTINE POLACRILEX 4 MG
15-30 LOZENGE BUCCAL
Status: DISCONTINUED | OUTPATIENT
Start: 2023-04-04 | End: 2023-04-07 | Stop reason: HOSPADM

## 2023-04-04 RX ORDER — IPRATROPIUM BROMIDE AND ALBUTEROL SULFATE 2.5; .5 MG/3ML; MG/3ML
3 SOLUTION RESPIRATORY (INHALATION) EVERY 4 HOURS
Status: DISCONTINUED | OUTPATIENT
Start: 2023-04-04 | End: 2023-04-04

## 2023-04-04 RX ORDER — ACETAMINOPHEN 650 MG/1
650 SUPPOSITORY RECTAL EVERY 6 HOURS PRN
Status: DISCONTINUED | OUTPATIENT
Start: 2023-04-04 | End: 2023-04-07 | Stop reason: HOSPADM

## 2023-04-04 RX ORDER — FUROSEMIDE 40 MG
40 TABLET ORAL DAILY
Status: DISCONTINUED | OUTPATIENT
Start: 2023-04-04 | End: 2023-04-06

## 2023-04-04 RX ORDER — ACETAMINOPHEN 325 MG/1
650 TABLET ORAL EVERY 6 HOURS PRN
COMMUNITY

## 2023-04-04 RX ORDER — LEVALBUTEROL INHALATION SOLUTION 1.25 MG/3ML
1.25 SOLUTION RESPIRATORY (INHALATION)
Status: DISCONTINUED | OUTPATIENT
Start: 2023-04-04 | End: 2023-04-07 | Stop reason: HOSPADM

## 2023-04-04 RX ORDER — PREDNISONE 20 MG/1
40 TABLET ORAL DAILY
Status: DISCONTINUED | OUTPATIENT
Start: 2023-04-04 | End: 2023-04-05

## 2023-04-04 RX ORDER — LISINOPRIL 40 MG/1
40 TABLET ORAL DAILY
Status: DISCONTINUED | OUTPATIENT
Start: 2023-04-04 | End: 2023-04-06

## 2023-04-04 RX ORDER — ACETAMINOPHEN 325 MG/1
650 TABLET ORAL EVERY 4 HOURS PRN
Status: DISCONTINUED | OUTPATIENT
Start: 2023-04-04 | End: 2023-04-07 | Stop reason: HOSPADM

## 2023-04-04 RX ORDER — GUAIFENESIN 200 MG/10ML
10 LIQUID ORAL EVERY 4 HOURS PRN
Status: DISCONTINUED | OUTPATIENT
Start: 2023-04-04 | End: 2023-04-07 | Stop reason: HOSPADM

## 2023-04-04 RX ORDER — DEXTROSE MONOHYDRATE 25 G/50ML
25-50 INJECTION, SOLUTION INTRAVENOUS
Status: DISCONTINUED | OUTPATIENT
Start: 2023-04-04 | End: 2023-04-07 | Stop reason: HOSPADM

## 2023-04-04 RX ORDER — AMLODIPINE BESYLATE 5 MG/1
5 TABLET ORAL DAILY
Status: DISCONTINUED | OUTPATIENT
Start: 2023-04-04 | End: 2023-04-06

## 2023-04-04 RX ORDER — CARVEDILOL 6.25 MG/1
6.25 TABLET ORAL 2 TIMES DAILY WITH MEALS
Status: DISCONTINUED | OUTPATIENT
Start: 2023-04-04 | End: 2023-04-06

## 2023-04-04 RX ORDER — CYCLOBENZAPRINE HCL 5 MG
5-10 TABLET ORAL 2 TIMES DAILY PRN
Status: DISCONTINUED | OUTPATIENT
Start: 2023-04-04 | End: 2023-04-07 | Stop reason: HOSPADM

## 2023-04-04 RX ORDER — MAGNESIUM SULFATE HEPTAHYDRATE 40 MG/ML
2 INJECTION, SOLUTION INTRAVENOUS ONCE
Status: COMPLETED | OUTPATIENT
Start: 2023-04-04 | End: 2023-04-04

## 2023-04-04 RX ORDER — AZITHROMYCIN 500 MG/5ML
500 INJECTION, POWDER, LYOPHILIZED, FOR SOLUTION INTRAVENOUS EVERY 24 HOURS
Status: DISCONTINUED | OUTPATIENT
Start: 2023-04-04 | End: 2023-04-05

## 2023-04-04 RX ORDER — PIPERACILLIN SODIUM, TAZOBACTAM SODIUM 3; .375 G/15ML; G/15ML
3.38 INJECTION, POWDER, LYOPHILIZED, FOR SOLUTION INTRAVENOUS ONCE
Status: COMPLETED | OUTPATIENT
Start: 2023-04-04 | End: 2023-04-04

## 2023-04-04 RX ORDER — LIDOCAINE 40 MG/G
CREAM TOPICAL
Status: DISCONTINUED | OUTPATIENT
Start: 2023-04-04 | End: 2023-04-07 | Stop reason: HOSPADM

## 2023-04-04 RX ORDER — METHYLPREDNISOLONE SODIUM SUCCINATE 125 MG/2ML
40 INJECTION, POWDER, LYOPHILIZED, FOR SOLUTION INTRAMUSCULAR; INTRAVENOUS DAILY
Status: DISCONTINUED | OUTPATIENT
Start: 2023-04-04 | End: 2023-04-04

## 2023-04-04 RX ORDER — PIPERACILLIN SODIUM, TAZOBACTAM SODIUM 3; .375 G/15ML; G/15ML
3.38 INJECTION, POWDER, LYOPHILIZED, FOR SOLUTION INTRAVENOUS EVERY 8 HOURS
Status: DISCONTINUED | OUTPATIENT
Start: 2023-04-04 | End: 2023-04-04

## 2023-04-04 RX ORDER — PIPERACILLIN SODIUM, TAZOBACTAM SODIUM 3; .375 G/15ML; G/15ML
3.38 INJECTION, POWDER, LYOPHILIZED, FOR SOLUTION INTRAVENOUS EVERY 8 HOURS
Status: DISCONTINUED | OUTPATIENT
Start: 2023-04-04 | End: 2023-04-06

## 2023-04-04 RX ORDER — IPRATROPIUM BROMIDE AND ALBUTEROL SULFATE 2.5; .5 MG/3ML; MG/3ML
3 SOLUTION RESPIRATORY (INHALATION) EVERY 4 HOURS PRN
Status: DISCONTINUED | OUTPATIENT
Start: 2023-04-04 | End: 2023-04-04

## 2023-04-04 RX ORDER — LEVALBUTEROL INHALATION SOLUTION 1.25 MG/3ML
1.25 SOLUTION RESPIRATORY (INHALATION) EVERY 4 HOURS PRN
Status: DISCONTINUED | OUTPATIENT
Start: 2023-04-04 | End: 2023-04-07 | Stop reason: HOSPADM

## 2023-04-04 RX ORDER — SODIUM CHLORIDE 9 MG/ML
INJECTION, SOLUTION INTRAVENOUS CONTINUOUS
Status: DISCONTINUED | OUTPATIENT
Start: 2023-04-04 | End: 2023-04-04

## 2023-04-04 RX ORDER — LEVALBUTEROL INHALATION SOLUTION 1.25 MG/3ML
1.25 SOLUTION RESPIRATORY (INHALATION) ONCE
Status: COMPLETED | OUTPATIENT
Start: 2023-04-04 | End: 2023-04-04

## 2023-04-04 RX ORDER — PANTOPRAZOLE SODIUM 40 MG/1
40 TABLET, DELAYED RELEASE ORAL DAILY
Status: DISCONTINUED | OUTPATIENT
Start: 2023-04-04 | End: 2023-04-07 | Stop reason: HOSPADM

## 2023-04-04 RX ORDER — AMLODIPINE BESYLATE 5 MG/1
5 TABLET ORAL DAILY
Status: ON HOLD | COMMUNITY
End: 2023-04-07

## 2023-04-04 RX ORDER — METFORMIN HCL 500 MG
500 TABLET, EXTENDED RELEASE 24 HR ORAL
Status: DISCONTINUED | OUTPATIENT
Start: 2023-04-04 | End: 2023-04-07 | Stop reason: HOSPADM

## 2023-04-04 RX ORDER — NITROGLYCERIN 0.4 MG/1
0.4 TABLET SUBLINGUAL EVERY 5 MIN PRN
Status: DISCONTINUED | OUTPATIENT
Start: 2023-04-04 | End: 2023-04-07 | Stop reason: HOSPADM

## 2023-04-04 RX ORDER — LISINOPRIL 40 MG/1
40 TABLET ORAL DAILY
COMMUNITY

## 2023-04-04 RX ADMIN — PIPERACILLIN AND TAZOBACTAM 3.38 G: 3; .375 INJECTION, POWDER, LYOPHILIZED, FOR SOLUTION INTRAVENOUS at 16:58

## 2023-04-04 RX ADMIN — LEVALBUTEROL HYDROCHLORIDE 1.25 MG: 1.25 SOLUTION RESPIRATORY (INHALATION) at 19:48

## 2023-04-04 RX ADMIN — CYCLOBENZAPRINE HYDROCHLORIDE 5 MG: 5 TABLET, FILM COATED ORAL at 10:46

## 2023-04-04 RX ADMIN — IPRATROPIUM BROMIDE 0.5 MG: 0.5 SOLUTION RESPIRATORY (INHALATION) at 19:48

## 2023-04-04 RX ADMIN — ACETAMINOPHEN 650 MG: 325 TABLET ORAL at 17:49

## 2023-04-04 RX ADMIN — METHYLPREDNISOLONE SODIUM SUCCINATE 37.5 MG: 125 INJECTION, POWDER, FOR SOLUTION INTRAMUSCULAR; INTRAVENOUS at 07:54

## 2023-04-04 RX ADMIN — GUAIFENESIN 10 ML: 100 SOLUTION ORAL at 07:54

## 2023-04-04 RX ADMIN — PREDNISONE 40 MG: 20 TABLET ORAL at 12:17

## 2023-04-04 RX ADMIN — LEVALBUTEROL HYDROCHLORIDE 1.25 MG: 1.25 SOLUTION RESPIRATORY (INHALATION) at 16:47

## 2023-04-04 RX ADMIN — GUAIFENESIN 10 ML: 100 SOLUTION ORAL at 22:09

## 2023-04-04 RX ADMIN — PIPERACILLIN AND TAZOBACTAM 3.38 G: 3; .375 INJECTION, POWDER, LYOPHILIZED, FOR SOLUTION INTRAVENOUS at 10:36

## 2023-04-04 RX ADMIN — LEVALBUTEROL HYDROCHLORIDE 1.25 MG: 1.25 SOLUTION RESPIRATORY (INHALATION) at 11:15

## 2023-04-04 RX ADMIN — CARVEDILOL 6.25 MG: 6.25 TABLET, FILM COATED ORAL at 17:46

## 2023-04-04 RX ADMIN — PIPERACILLIN AND TAZOBACTAM 3.38 G: 3; .375 INJECTION, POWDER, LYOPHILIZED, FOR SOLUTION INTRAVENOUS at 03:09

## 2023-04-04 RX ADMIN — IPRATROPIUM BROMIDE 0.5 MG: 0.5 SOLUTION RESPIRATORY (INHALATION) at 11:14

## 2023-04-04 RX ADMIN — METFORMIN HYDROCHLORIDE 500 MG: 500 TABLET, EXTENDED RELEASE ORAL at 16:58

## 2023-04-04 RX ADMIN — AZITHROMYCIN 500 MG: 500 INJECTION, POWDER, LYOPHILIZED, FOR SOLUTION INTRAVENOUS at 22:10

## 2023-04-04 RX ADMIN — MAGNESIUM SULFATE HEPTAHYDRATE 2 G: 40 INJECTION, SOLUTION INTRAVENOUS at 01:08

## 2023-04-04 RX ADMIN — BENZONATATE 100 MG: 100 CAPSULE ORAL at 10:36

## 2023-04-04 RX ADMIN — PANTOPRAZOLE SODIUM 40 MG: 40 TABLET, DELAYED RELEASE ORAL at 10:46

## 2023-04-04 RX ADMIN — INSULIN ASPART 5 UNITS: 100 INJECTION, SOLUTION INTRAVENOUS; SUBCUTANEOUS at 07:55

## 2023-04-04 RX ADMIN — SODIUM CHLORIDE: 9 INJECTION, SOLUTION INTRAVENOUS at 03:09

## 2023-04-04 RX ADMIN — IPRATROPIUM BROMIDE 0.5 MG: 0.5 SOLUTION RESPIRATORY (INHALATION) at 16:46

## 2023-04-04 RX ADMIN — BENZONATATE 100 MG: 100 CAPSULE ORAL at 01:56

## 2023-04-04 RX ADMIN — LEVALBUTEROL HYDROCHLORIDE 1.25 MG: 1.25 SOLUTION RESPIRATORY (INHALATION) at 00:08

## 2023-04-04 RX ADMIN — CARVEDILOL 6.25 MG: 6.25 TABLET, FILM COATED ORAL at 10:46

## 2023-04-04 RX ADMIN — BENZONATATE 100 MG: 100 CAPSULE ORAL at 16:58

## 2023-04-04 ASSESSMENT — ACTIVITIES OF DAILY LIVING (ADL)
ADLS_ACUITY_SCORE: 35
ADLS_ACUITY_SCORE: 35
DRESSING/BATHING_DIFFICULTY: NO
CHANGE_IN_FUNCTIONAL_STATUS_SINCE_ONSET_OF_CURRENT_ILLNESS/INJURY: NO
FALL_HISTORY_WITHIN_LAST_SIX_MONTHS: NO
ADLS_ACUITY_SCORE: 35
TOILETING_ISSUES: NO
ADLS_ACUITY_SCORE: 22
ADLS_ACUITY_SCORE: 35
ADLS_ACUITY_SCORE: 37
DOING_ERRANDS_INDEPENDENTLY_DIFFICULTY: NO
VISION_MANAGEMENT: HAS GLASSES
ADLS_ACUITY_SCORE: 35
WEAR_GLASSES_OR_BLIND: YES
ADLS_ACUITY_SCORE: 35
CONCENTRATING,_REMEMBERING_OR_MAKING_DECISIONS_DIFFICULTY: NO
ADLS_ACUITY_SCORE: 35
DIFFICULTY_EATING/SWALLOWING: NO
ADLS_ACUITY_SCORE: 37
WALKING_OR_CLIMBING_STAIRS_DIFFICULTY: NO

## 2023-04-04 NOTE — PROGRESS NOTES
[facility]    Medicine Progress Note - Hospitalist Service    Date of Admission:  4/3/2023    Assessment & Plan   52-year-old female with history of obesity, essential hypertension, suspected COPD but not confirmed by PFT, chronic heart failure with reduced ejection fraction, diabetes mellitus type 2, adjustment disorder and depression, polysubstance abuse including cocaine abuse and tobacco abuse, peptic ulcer disease, anxiety, and gastroesophageal reflux disease presenting with acute respiratory failure with hypoxia likely asthma exacerbation.    Acute COPD exacerbation.  Acute respiratory failure with hypoxia  Acute bronchitis, bronchiolitis.  Elevated D-dimer  Recent treatment for multifocal pneumonia in January 2023  SARS-CoV-2 PCR, influenza AMB and RSV negative.  VBG PCO2 normal at 44, pH 7.32.  CT chest PE protocol no PE.  Continue azithromycin 500 mg IV every 24 hours  Continue Zosyn 3.375 g IV every 8 hours  Order prednisone 40 mg daily  Continue supplemental oxygen as needed  DuoNeb q4h.  Continue cough suppression medication as tolerated  Aspiration precaution  Speech bedside swallow evaluation.  Encourage ambulation as tolerated    Hypomagnesemia  Magnesium replaced per protocol.    Chronic kidney disease stage II  Creatinine 1.2 baseline 1.0.  Monitor base metabolic profile daily.    Essential hypertension  Order PTA medication: Amlodipine 5 mg daily, carvedilol 6.25 mg twice daily, and lisinopril 40 mg daily with hold parameters.    Normocytic anemia  Hemoglobin 10.8 g/dL down from 12.2 g/dL on admission.  Likely dilutional.  Repeat CBC in a.m.    History of coronary artery disease, STEMI in November 2022.  Chronic heart failure with reduced ejection fraction  PTA medication: Hold furosemide 40 mg daily, monitor kidney function, electrolyte levels  Discontinue IV fluids    Uncontrolled diabetes mellitus type 2 with hyperglycemia  Order insulin NovoLog high resistance dosing 4  times daily ACHS  Resume PTA metformin    History of peptic ulcer disease  Continue omeprazole 20 mg daily    History of CVA with left facial droop and dysarthria    Mood disorder  Bipolar disorder  Anxiety disorder  Not currently on medication.    Tobacco abuse  Smoking cessation counseling provided.       Diet: Consistent Carbohydrate Diet Moderate Consistent Carb (60 g CHO per Meal) Diet    DVT Prophylaxis: Pneumatic Compression Devices  Sherwood Catheter: Not present  Lines: None     Cardiac Monitoring: ACTIVE order. Indication: COPD, low Mag  Code Status: Full Code      Clinically Significant Risk Factors Present on Admission            # Hypomagnesemia: Lowest Mg = 1.1 mg/dL in last 2 days, will replace as needed       # Hypertension: home medication list includes antihypertensive(s)              Disposition Plan      Expected Discharge Date: 04/06/2023                  Nick Adrian DO  Hospitalist Service  [facility]  Securely message with "[employer]" (more info)  Text page via [facility] Paging/Directory   ______________________________________________________________________    Interval History    Patient complains of cough with pleuritic chest pain over anterior and posterior costophrenic margin, shortness of breath.     Physical Exam   Vital Signs: Temp: 97.9  F (36.6  C) Temp src: Oral BP: 119/57 Pulse: 90   Resp: 21 SpO2: 95 % O2 Device: Nasal cannula Oxygen Delivery: 2 LPM  Weight: 200 lbs 0 oz  General Appearance:  Alert, cooperative, coughing  Head:    Normocephalic, without obvious abnormality, atraumatic  EENT:  PERRL, conjunctiva/corneas clear, EOM's intact.   Neck:    Supple, symmetrical, trachea midline, no adenopathy; no NVE  Back:  Symmetric, no curvature, no CVA tenderness  Chest/Lungs: decreased air entry, wheezing, no rales, respirations unlabored, No tenderness or deformity. No abdominal breathing or use of accessory muscles.   Heart:    Regular rate and rhythm, S1 and  S2 normal, no murmur, rub   or gallop  Abdomen: Soft, non-tender, bowel sounds active all four quadrants, not peritoneal on palpation. Not distended  Extremities:  Extremities normal, atraumatic, no swelling   Skin:  Skin color, texture, turgor normal, no rashes or lesion  Neurologic:  Awake and alert, no lateralizing or localizing signs     Medical Decision Making   38 MINUTES SPENT BY ME on the date of service doing chart review, history, exam, documentation & further activities per the note.      Data     I have personally reviewed the following data over the past 24 hrs:    3.7 (L)  \   10.8 (L)   / 172     131 (L) 101 18 /  385 (H)   4.2 20 (L) 1.20 (H) \       ALT: 19 AST: 16 AP: 77 TBILI: 0.6   ALB: 3.5 TOT PROTEIN: 7.1 LIPASE: N/A       Trop: N/A BNP: 31       Procal: N/A CRP: N/A Lactic Acid: 0.8       INR:  N/A PTT:  N/A   D-dimer:  1.34 (H) Fibrinogen:  N/A       Imaging results reviewed over the past 24 hrs:   Recent Results (from the past 24 hour(s))   Chest XR,  PA & LAT    Narrative    EXAM: XR CHEST 2 VIEWS  LOCATION: [facility]  DATE/TIME: 4/3/2023 6:57 PM    INDICATION: Shortness of breath and tachypnea.  COMPARISON: 01/24/2023.      Impression    IMPRESSION: Small lung volumes and mild lingular atelectasis. No discrete airspace consolidation. No pleural effusion or pneumothorax.    Upper limits of normal heart size. Atherosclerosis of the thoracic aorta.    Cholecystectomy.   CT Chest Pulmonary Embolism w Contrast    Narrative    EXAM: CT CHEST PULMONARY EMBOLISM W CONTRAST  LOCATION: [facility]  DATE/TIME: 4/3/2023 9:36 PM    INDICATION: dyspnea, tachycardia, elevated D dimer  COMPARISON: 12/02/2022  TECHNIQUE: CT chest pulmonary angiogram during arterial phase injection of IV contrast. Multiplanar reformats and MIP reconstructions were performed. Dose reduction techniques were used.   CONTRAST: ISOVUE 370 100mL    FINDINGS:  ANGIOGRAM CHEST:  Pulmonary arteries are normal caliber and negative for pulmonary emboli. Thoracic aorta is negative for dissection. No CT evidence of right heart strain.    LUNGS AND PLEURA: Marked diffuse bronchial wall thickening. Scattered small groundglass nodules in the left lower lobe; for example, image 105:8. No pleural effusion. A stable benign 0.4 cm right upper lobe solid perifissural nodule, image 63:8.    MEDIASTINUM/AXILLAE: Left atrial enlargement. Mediastinal and hilar lymphadenopathy is similar to prior with the largest being a left hilar node measuring 1.4 cm in short axis, image 136:7.    CORONARY ARTERY CALCIFICATION: None.    UPPER ABDOMEN: Cholecystectomy. Hepatic steatosis.    MUSCULOSKELETAL: Mild degenerative changes.      Impression    IMPRESSION:  1.  No pulmonary embolus.  2.  Marked bronchial wall thickening.  3.  Small groundglass nodules in the left lower lobe either represent infectious bronchiolitis or aspiration.  4.  Stable mediastinal and hilar lymphadenopathy.

## 2023-04-04 NOTE — PLAN OF CARE
VSS, oxygen sats in mid to upper 90s throughout shift on 2L NC. Patient still has a dry and frequent cough, unable to obtain sputum culture, pts cough is not productive. Tele NSR. Protocols run, rechecks tomorrow. Receiving IV zosyn.

## 2023-04-04 NOTE — ED NOTES
Pt seen in the ED. Pt coughing a lot. BS diminished with wheezes on the right improved post neb. Upper airway wheezing. Pt was on room air in the low mid 90's. Asked the pt if she would like to go back on Bipap due to WOB. Pt stated she doesn't because she just wants to be comfortable.    Roz Pereira, RT

## 2023-04-04 NOTE — H&P
[facility] MEDICINE ADMISSION HISTORY AND PHYSICAL       Assessment & Plan      1. Acute hypoxic respiratory failure -- required use of BiPAP, unable to tolerate, now in O2.     Triggered by infection with  Marked bronchial wall thickening. And small groundglass nodules in the left lower lobe either represent infectious bronchiolitis or aspiration. Consider PNA clinically, she was hacking/coughing incessantly.     Also with smoking, could have undiagnosed COPD in exacerbation    - Continue antibiotics, will use with anaerobic coverage given aspiration concerns nebs, and steroids  - Pulse ox and tele  - smoking cessation  - STAT labs, cultures, Antigens, PCR  - cough supp     2. HTN  - PTA meds    3. History of CAD and Chronic CHF  - PTA meds    4. History of DM2  - sliding scale insulin/FS    5. History of CVA, with left facial droop, and dysarthria   - PTA meds    6. Has Mood disoder, Bipolar disorder  - PTA meds    7. Mag of 1.1  - tele and replacements        VTE prophylaxis: SCDs/lovenox  Diet:  NPO for now   Code Status: Full  COVID vaccination: No  Barriers to discharge: admitting clinical condition  Discharge Disposition and goals:  Unable to determine at this point, pending clinical progress and response to treatment. Patient may need transfer to SNF or ACR if unsafe to go home and needed treatment inappropriate at home setting OR may need home health care evaluation if care can be delivered at home settings. Consider referral to care manager/    PPE - I was wearing PPE when I met the patient including but not limited to - N95 mask, Gloves, and/or Safety glasses.      Care plan was created based on available information and patient's condition at the time of encounter. This was discussed with the patient and/or family members using layman's terms, including counseling/education and they have agreed to proceed. I recommend to revise care plan and to review history if  there is change in condition and/or new clinical information that is not available during my encounter. At the end of night shift, this case will be presented to the AM Hospitalist.       80 minutes spent by me on the date of service doing chart review, history, exam, diagnostic test results interpretation, documentation & further activities per the note.    Riki Hartman MD, MPH, FACP, [facility]  Internal Medicine - Hospitalist        Chief Complaint SOB      HISTORY     - Met her in ED-7. She is here for SOB and cough. She still smoking at least 10 sticks per day. She reported SOB and cough for 2 days. Denies CP and abdominal pain other than when is coughing. No fever.    - Last admission was 11/2022 -- for STEMI. She also had prior admission Dec 2022 for multifocal PNA    - In the ED, CT chest negative for PE. CT also showed Marked bronchial wall thickening. And small groundglass nodules in the left lower lobe either represent infectious bronchiolitis or aspiration.    - BNP is normal. Trop is normal. WBC is normal    - In the ED< she was given IV solumedrol, 2 IV antibiotics, and nebs, She was tried on BIPAP, did not tolerate. Mag is 1.1. She reported feeling better after ED treatment.     - ROS --- No headache. No dizziness. No weakness. No abdominal pain. No nausea or vomiting. No urinary symptoms. No bleeding symptoms. No weight loss. Rest of 12 point ROS was reviewed and negative.         Past Medical History     Past Medical History:   Diagnosis Date     Acute bronchitis      Acute upper respiratory infections of unspecified site      Alcohol dependence in remission (H) 11/8/2013     Anxiety state, unspecified      Bipolar disorder, unspecified (H) 11/15/2013     Calculus of kidney      Cervicalgia      Chronic back pain 11/8/2013     Cramp of limb      Diabetes mellitus (H)      Diabetes mellitus (H)      Impaired fasting glucose      Kidney stone      Kidney stones      Myocardial infarction (H)       Obesity, unspecified      Other abnormal Papanicolaou smear of cervix and cervical HPV(795.09)      Thoracic or lumbosacral neuritis or radiculitis, unspecified      Unspecified hypothyroidism          Surgical History     Past Surgical History:   Procedure Laterality Date     BACK SURGERY      7/2011 and 12/2011. Low back. Done at [facility].     CHOLECYSTECTOMY       CHOLECYSTECTOMY       CV CORONARY ANGIOGRAM N/A 9/5/2019    Procedure: Coronary Angiogram;  Surgeon: Patric Davies MD;  Location: [facility] Cath Lab;  Service: Cardiology     CV LEFT HEART CATHETERIZATION WITHOUT LEFT VENTRICULOGRAM Left 9/5/2019    Procedure: Left Heart Catheterization Without Left Ventriculogram;  Surgeon: Patric Davies MD;  Location: [facility] Cath Lab;  Service: Cardiology     EYE SURGERY       GENITOURINARY SURGERY       KIDNEY STONE SURGERY       ORTHOPEDIC SURGERY       TONSILLECTOMY       TUBAL LIGATION          Family History      Family History   Problem Relation Age of Onset     Other Cancer Father         Oral     Hypertension Father      Hypertension Maternal Grandmother      Diabetes Other         Paternal Uncle     Hypertension Other         Paternal Uncle     Asthma No family hx of      Heart Disease Mother      Cancer Mother         uterine     Urolithiasis Paternal Uncle      Diabetes Maternal Grandfather      Urolithiasis Paternal Uncle      Clotting Disorder No family hx of      Gout No family hx of          Social History      .  Social History     Socioeconomic History     Marital status: Single     Spouse name: Not on file     Number of children: Not on file     Years of education: Not on file     Highest education level: Not on file   Occupational History     Occupation: Employed   Tobacco Use     Smoking status: Every Day     Packs/day: 0.50     Types: Cigarettes     Smokeless tobacco: Never     Tobacco comments:     Declined CIQ, not ready to quit. smoking about 10 a day   Vaping Use      Vaping status: Never Used   Substance and Sexual Activity     Alcohol use: No     Alcohol/week: 0.0 standard drinks of alcohol     Drug use: No     Sexual activity: Not on file   Other Topics Concern     Parent/sibling w/ CABG, MI or angioplasty before 65F 55M? Not Asked   Social History Narrative    Education: 12th    Child: Philomena 15     Social Determinants of Health     Financial Resource Strain: Not on file   Food Insecurity: Not on file   Transportation Needs: Not on file   Physical Activity: Not on file   Stress: Not on file   Social Connections: Not on file   Intimate Partner Violence: Not on file   Housing Stability: Not on file          Allergies        Allergies   Allergen Reactions     Nkda [No Known Drug Allergies]          Prior to Admission Medications      No current facility-administered medications on file prior to encounter.  albuterol (PROAIR HFA/PROVENTIL HFA/VENTOLIN HFA) 108 (90 Base) MCG/ACT inhaler, Inhale 2 puffs into the lungs every 6 hours as needed for shortness of breath / dyspnea or wheezing  aspirin 81 MG chewable tablet, Take 1 tablet (81 mg) by mouth daily (Patient not taking: Reported on 9/14/2021)  carvedilol (COREG) 3.125 MG tablet, Take 2 tablets (6.25 mg) by mouth 2 times daily (with meals)  cyclobenzaprine (FLEXERIL) 5 MG tablet, Take 1-2 tablets (5-10 mg) by mouth 2 times daily as needed for muscle spasms  diclofenac (VOLTAREN) 1 % topical gel, Apply 4 g topically 4 times daily  furosemide (LASIX) 40 MG tablet, Take 1 tablet (40 mg) by mouth daily  lisinopril (ZESTRIL) 5 MG tablet, Take 3 tablets (15 mg) by mouth daily  metFORMIN (GLUCOPHAGE-XR) 500 MG 24 hr tablet, Take 1 tablet (500 mg) by mouth daily (with dinner)  nitroGLYcerin (NITROSTAT) 0.4 MG sublingual tablet, Place 1 tablet (0.4 mg) under the tongue every 5 minutes as needed for chest pain If you are still having symptoms after 3 doses (15 minutes) call 911.  omeprazole (PRILOSEC) 20 MG DR capsule, Take 1 capsule  (20 mg) by mouth daily  STATIN NOT PRESCRIBED, INTENTIONAL,, Statin not prescribed intentionally due to Other LDL wnl, ASCVD <7.5% (This option does not exclude patient from measure) (Patient not taking: Reported on 5/6/2021)            Review of Systems     A 12 point comprehensive review of systems was negative except as noted above in HPI.    PHYSICAL EXAMINATION       Vitals      Vitals: /65   Pulse 88   Temp 99.5  F (37.5  C) (Oral)   Resp 18   Wt 90.7 kg (200 lb)   SpO2 96%   BMI 36.58 kg/m    BMI= Body mass index is 36.58 kg/m .      Examination     General Appearance:  Alert, cooperative, no distress  Head:    Normocephalic, without obvious abnormality, atraumatic  EENT:  PERRL, conjunctiva/corneas clear, EOM's intact.   Neck:   Supple, symmetrical, trachea midline, no adenopathy; no NVE  Back:  Symmetric, no curvature, no CVA tenderness  Chest/Lungs: decreased air entry, mild wheezing, no rales, respirations unlabored, No tenderness or deformity. No abdominal breathing or use of accessory muscles.   Heart:    Regular rate and rhythm, S1 and S2 normal, no murmur, rub   or gallop  Abdomen: Soft, non-tender, bowel sounds active all four quadrants, not peritoneal on palpation. Not distended  Extremities:  Extremities normal, atraumatic, no swelling   Skin:  Skin color, texture, turgor normal, no rashes or lesion  Neurologic:  Awake and alert, no lateralizing or localizing signs           Pertinent Lab     Results for orders placed or performed during the hospital encounter of 04/03/23   Chest XR,  PA & LAT    Impression    IMPRESSION: Small lung volumes and mild lingular atelectasis. No discrete airspace consolidation. No pleural effusion or pneumothorax.    Upper limits of normal heart size. Atherosclerosis of the thoracic aorta.    Cholecystectomy.   CT Chest Pulmonary Embolism w Contrast    Impression    IMPRESSION:  1.  No pulmonary embolus.  2.  Marked bronchial wall thickening.  3.  Small  groundglass nodules in the left lower lobe either represent infectious bronchiolitis or aspiration.  4.  Stable mediastinal and hilar lymphadenopathy.   D dimer quantitative   Result Value Ref Range    D-Dimer Quantitative 1.34 (H) 0.00 - 0.50 ug/mL FEU   Symptomatic Influenza A/B, RSV, & SARS-CoV2 PCR (COVID-19) Nasopharyngeal    Specimen: Nasopharyngeal; Swab   Result Value Ref Range    Influenza A PCR Negative Negative    Influenza B PCR Negative Negative    RSV PCR Negative Negative    SARS CoV2 PCR Negative Negative   Comprehensive metabolic panel   Result Value Ref Range    Sodium 134 (L) 136 - 145 mmol/L    Potassium 4.4 3.5 - 5.0 mmol/L    Chloride 102 98 - 107 mmol/L    Carbon Dioxide (CO2) 21 (L) 22 - 31 mmol/L    Anion Gap 11 5 - 18 mmol/L    Urea Nitrogen 15 8 - 22 mg/dL    Creatinine 1.09 0.60 - 1.10 mg/dL    Calcium 9.7 8.5 - 10.5 mg/dL    Glucose 138 (H) 70 - 125 mg/dL    Alkaline Phosphatase 87 45 - 120 U/L    AST 19 0 - 40 U/L    ALT 19 0 - 45 U/L    Protein Total 7.8 6.0 - 8.0 g/dL    Albumin 4.0 3.5 - 5.0 g/dL    Bilirubin Total 0.9 0.0 - 1.0 mg/dL    GFR Estimate 61 >60 mL/min/1.73m2   B-Type Natriuretic Peptide ([facility] Only)   Result Value Ref Range    BNP 31 0 - 74 pg/mL   Result Value Ref Range    Troponin I <0.01 0.00 - 0.29 ng/mL   CBC with platelets and differential   Result Value Ref Range    WBC Count 5.8 4.0 - 11.0 10e3/uL    RBC Count 3.71 (L) 3.80 - 5.20 10e6/uL    Hemoglobin 12.2 11.7 - 15.7 g/dL    Hematocrit 34.3 (L) 35.0 - 47.0 %    MCV 93 78 - 100 fL    MCH 32.9 26.5 - 33.0 pg    MCHC 35.6 31.5 - 36.5 g/dL    RDW 12.1 10.0 - 15.0 %    Platelet Count 221 150 - 450 10e3/uL    % Neutrophils 68 %    % Lymphocytes 18 %    % Monocytes 12 %    % Eosinophils 1 %    % Basophils 1 %    % Immature Granulocytes 0 %    NRBCs per 100 WBC 0 <1 /100    Absolute Neutrophils 3.9 1.6 - 8.3 10e3/uL    Absolute Lymphocytes 1.1 0.8 - 5.3 10e3/uL    Absolute Monocytes 0.7 0.0 - 1.3 10e3/uL    Absolute  Eosinophils 0.1 0.0 - 0.7 10e3/uL    Absolute Basophils 0.0 0.0 - 0.2 10e3/uL    Absolute Immature Granulocytes 0.0 <=0.4 10e3/uL    Absolute NRBCs 0.0 10e3/uL   Lactic acid whole blood   Result Value Ref Range    Lactic Acid 0.8 0.7 - 2.0 mmol/L   Blood gas venous   Result Value Ref Range    pH Venous 7.34 (L) 7.35 - 7.45    pCO2 Venous 49 35 - 50 mm Hg    pO2 Venous 20 (L) 25 - 47 mm Hg    Bicarbonate Venous 26 24 - 30 mmol/L    Base Excess/Deficit (+/-) 0.5   mmol/L    Oxyhemoglobin Venous 28.2 (L) 70.0 - 75.0 %    O2 Sat, Venous 28.8 (L) 70.0 - 75.0 %   Result Value Ref Range    Magnesium 1.1 (L) 1.8 - 2.6 mg/dL   ECG 12-LEAD WITH MUSE ([facility])   Result Value Ref Range    Systolic Blood Pressure 135 mmHg    Diastolic Blood Pressure 67 mmHg    Ventricular Rate 95 BPM    Atrial Rate 95 BPM    HI Interval 170 ms    QRS Duration 96 ms     ms    QTc 437 ms    P Axis 31 degrees    R AXIS -26 degrees    T Axis 5 degrees    Interpretation ECG       Sinus rhythm  Voltage criteria for left ventricular hypertrophy  Abnormal ECG  When compared with ECG of 16-FEB-2023 17:40,  Nonspecific T wave abnormality no longer evident in Anterolateral leads  Confirmed by SEE ED PROVIDER NOTE FOR, ECG INTERPRETATION ([id number]),  Belem Benavides ([id number]) on 4/4/2023 12:56:24 AM             Pertinent Radiology

## 2023-04-04 NOTE — ED PROVIDER NOTES
EMERGENCY DEPARTMENT ENCOUNTER      NAME: Lola Arias  AGE: 52 year old female  YOB: 1970  MRN: [id number]  EVALUATION DATE & TIME: No admission date for patient encounter.    PCP: David Armijo    ED PROVIDER: Donte Perez M.D.    Chief Complaint   Patient presents with     Shortness of Breath     FINAL IMPRESSION:  1. Recurrent pneumonia    2. Acute bronchitis, unspecified organism    3. Hypomagnesemia      ED COURSE & MEDICAL DECISION MAKING:    Pertinent Labs & Imaging studies independently interpreted by me. (See chart for details)  9:10 PM  Patient seen and examined, review of prior records shows multiple prior visits with shortness of breath, bronchitis, cough most recently in the emergency department for this in November 2022 and at that time chest x-ray was negative.  Also seen by pulmonology with Guilherme on January 24, 2023 and felt to be at risk of COPD but has not had further evaluation for this including spirometry.  Prior STEMI in November 2022.  Presents today with shortness of breath.  Labs ordered from triage due to critical capacity in the emergency department show normal troponin, normal BNP, reassuring basic panel and LFTs, normal white blood cell count.  D-dimer is elevated.  Chest x-ray has been done but given tachycardia, low-grade fever, hypoxia and respiratory distress, CT PE study is ordered.   9:20 PM Noted that chest x-ray was marked as completed at 6:57 PM but has not been read and no images available.  Contacted radiology.  9:53 PM chest x-ray independently interpreted by me negative for acute findings.  CT scan independently interpreted by me demonstrate a acute pulmonary embolism, there does appear to be some bronchial wall thickening and also an infiltrate in the left lower lobe as well as small area of infiltrate in the left upper lobe.  10:24 PM patient seen in the [facility].  Talking in 3-4 word sentences, tachypneic in the 40s in spite of 2 L of oxygen.  Notified  charge nurse that patient needs to be roomed immediately for treatment.  10:47 PM additional labs ordered by me and independently interpreted by me demonstrate venous gas with pH 7.34 and PCO2 49, magnesium 1.1 and intravenous replacement has already been started.  Lactate 0.8.  10:56 PM patient rechecked.  Oxygen saturations reassuring but still with tachypnea and prolonged expiratory phase, says she feels very tired.  Will trial on BiPAP due to respiratory fatigue.  Additional nebulizer treatments ordered.  12:02 AM patient was briefly on BiPAP but did not tolerate due to claustrophobia.  On recheck, heart rate is improving and lung sounds are improved as well.  Continue with nebulizer treatment.  Waiting for callback from system office regarding bed availability for transfer.  12:36 AM waiting for callback from system ops.  Original transfer order placed at 10:23 PM.  Contacted [facility], no beds available.  1:00 AM no beds available in system or out of system.  Patient is clinically improved with respiratory rate improved and heart rate improving as well.  1:09 AM care discussed with Dr. Hartman for admission.    At the conclusion of the encounter I discussed the results of all of the tests and the disposition. The questions were answered. The patient or family acknowledged understanding and was agreeable with the care plan.     Medical Decision Making    History:    Supplemental history from: Documented in chart, if applicable    External Record(s) reviewed: Documented in chart, if applicable.    Work Up:    Chart documentation includes differential considered and any EKGs or imaging independently interpreted by provider, where specified.    In additional to work up documented, I considered the following work up: Documented in chart, if applicable.    External consultation:    Discussion of management with another provider: Documented in chart, if applicable    Complicating factors:    Care  impacted by chronic illness: Diabetes, Heart Disease and Mental Health    Care affected by social determinants of health: Access to Medical Care    Disposition considerations: Admit.    EKG:    Performed at: 11:03 PM  Impression: No acute ischemic changes  Rate: 95  Rhythm: Sinus  Axis: Normal  [place] Interval: 170  QRS Interval: 96  QTc Interval: 437  ST Changes: No ischemic changes  Comparison: February 2023, no acute changes    I have independently reviewed and interpreted the EKG(s) documented above.    PROCEDURES:     Critical Care   Performed by: Dr Donte Perez  Total critical care time: 130 minutes  Critical care was necessary to treat or prevent imminent or life-threatening deterioration of the following conditions:  Hypomagnesemia 1.1 with IV replacement and admission  Critical care was time spent personally by me on the following activities: development of treatment plan with patient or surrogate, discussions with consultants, examination of patient, evaluation of patient's response to treatment, obtaining history from patient or surrogate, ordering and performing treatments and interventions, ordering and review of laboratory studies, ordering and review of radiographic studies, re-evaluation of patient's condition and monitoring for potential decompensation.  Critical care time was exclusive of separately billable procedures and treating other patients.    MEDICATIONS GIVEN IN THE EMERGENCY:  Medications   magnesium sulfate 2 g in 50 mL sterile water intermittent infusion (2 g Intravenous $New Bag 4/4/23 0108)   guaiFENesin (ROBITUSSIN) 20 mg/mL solution 10 mL (has no administration in time range)   benzonatate (TESSALON) capsule 100 mg (100 mg Oral $Given 4/4/23 0156)   iopamidol (ISOVUE-370) solution 100 mL (100 mLs Intravenous $Given 4/3/23 2128)   methylPREDNISolone sodium succinate (solu-MEDROL) injection 125 mg (125 mg Intravenous $Given 4/3/23 2231)   magnesium sulfate 2 g in 50 mL sterile water  intermittent infusion (0 g Intravenous Stopped 4/3/23 1039)   ipratropium (ATROVENT) 0.02 % neb solution 0.5 mg (0.5 mg Nebulization $Given 4/3/23 2244)     And   levalbuterol (XOPENEX) neb solution 1.25 mg (1.25 mg Nebulization $Given 4/3/23 2244)   cefTRIAXone (ROCEPHIN) 2 g vial to attach to  ml bag for ADULTS or NS 50 ml bag for PEDS (0 g Intravenous Stopped 4/3/23 2330)   azithromycin 500 mg (ZITHROMAX) in 0.9% NaCl 250 mL intermittent infusion 500 mg (0 mg Intravenous Stopped 4/4/23 0052)   levalbuterol (XOPENEX) neb solution 1.25 mg (1.25 mg Nebulization $Given 4/4/23 0008)       NEW PRESCRIPTIONS STARTED AT TODAY'S ER VISIT  New Prescriptions    No medications on file       =================================================================    HPI    Patient information was obtained from: Patient      Lola Arias is a 52 year old female with a pertinent history of bronchitis, pneumonia, bipolar disorder, anxiety who presents to this ED for evaluation of cough and shortness of breath that started this morning.  She denies chest pain, has had some nausea but no vomiting.  Denies fevers or chills.  Had similar last fall.  Denies ill contacts.    REVIEW OF SYSTEMS   Review of Systems   All other systems reviewed and negative    PAST MEDICAL HISTORY:  Past Medical History:   Diagnosis Date     Acute bronchitis      Acute upper respiratory infections of unspecified site      Alcohol dependence in remission (H) 11/8/2013     Anxiety state, unspecified      Bipolar disorder, unspecified (H) 11/15/2013     Calculus of kidney      Cervicalgia      Chronic back pain 11/8/2013     Cramp of limb      Diabetes mellitus (H)      Diabetes mellitus (H)      Impaired fasting glucose      Kidney stone      Kidney stones      Myocardial infarction (H)      Obesity, unspecified      Other abnormal Papanicolaou smear of cervix and cervical HPV(795.09)      Thoracic or lumbosacral neuritis or radiculitis, unspecified       Unspecified hypothyroidism        PAST SURGICAL HISTORY:  Past Surgical History:   Procedure Laterality Date     BACK SURGERY      7/2011 and 12/2011. Low back. Done at [facility].     CHOLECYSTECTOMY       CHOLECYSTECTOMY       CV CORONARY ANGIOGRAM N/A 9/5/2019    Procedure: Coronary Angiogram;  Surgeon: Patric Davies MD;  Location: [facility] Cath Lab;  Service: Cardiology     CV LEFT HEART CATHETERIZATION WITHOUT LEFT VENTRICULOGRAM Left 9/5/2019    Procedure: Left Heart Catheterization Without Left Ventriculogram;  Surgeon: Patric Davies MD;  Location: [facility] Cath Lab;  Service: Cardiology     EYE SURGERY       GENITOURINARY SURGERY       KIDNEY STONE SURGERY       ORTHOPEDIC SURGERY       TONSILLECTOMY       TUBAL LIGATION         CURRENT MEDICATIONS:    Current Facility-Administered Medications   Medication     benzonatate (TESSALON) capsule 100 mg     guaiFENesin (ROBITUSSIN) 20 mg/mL solution 10 mL     magnesium sulfate 2 g in 50 mL sterile water intermittent infusion     Current Outpatient Medications   Medication     albuterol (PROAIR HFA/PROVENTIL HFA/VENTOLIN HFA) 108 (90 Base) MCG/ACT inhaler     aspirin 81 MG chewable tablet     carvedilol (COREG) 3.125 MG tablet     cyclobenzaprine (FLEXERIL) 5 MG tablet     diclofenac (VOLTAREN) 1 % topical gel     furosemide (LASIX) 40 MG tablet     lisinopril (ZESTRIL) 5 MG tablet     metFORMIN (GLUCOPHAGE-XR) 500 MG 24 hr tablet     nitroGLYcerin (NITROSTAT) 0.4 MG sublingual tablet     omeprazole (PRILOSEC) 20 MG DR capsule     STATIN NOT PRESCRIBED, INTENTIONAL,       ALLERGIES:  Allergies   Allergen Reactions     Nkda [No Known Drug Allergies]        FAMILY HISTORY:  Family History   Problem Relation Age of Onset     Other Cancer Father         Oral     Hypertension Father      Hypertension Maternal Grandmother      Diabetes Other         Paternal Uncle     Hypertension Other         Paternal Uncle     Asthma No family hx of      Heart  Disease Mother      Cancer Mother         uterine     Urolithiasis Paternal Uncle      Diabetes Maternal Grandfather      Urolithiasis Paternal Uncle      Clotting Disorder No family hx of      Gout No family hx of        SOCIAL HISTORY:   Social History     Socioeconomic History     Marital status: Single   Occupational History     Occupation: Employed   Tobacco Use     Smoking status: Every Day     Packs/day: 0.50     Types: Cigarettes     Smokeless tobacco: Never     Tobacco comments:     Declined CIQ, not ready to quit. smoking about 10 a day   Vaping Use     Vaping status: Never Used   Substance and Sexual Activity     Alcohol use: No     Alcohol/week: 0.0 standard drinks of alcohol     Drug use: No   Social History Narrative    Education: 12th    Child: Philomena 16       VITALS:  /74   Pulse 89   Temp 99.5  F (37.5  C) (Oral)   Resp 16   Wt 90.7 kg (200 lb)   SpO2 95%   BMI 36.58 kg/m      PHYSICAL EXAM:  Physical Exam  Vitals and nursing note reviewed.   Constitutional:       Appearance: Normal appearance.   HENT:      Head: Normocephalic and atraumatic.      Right Ear: External ear normal.      Left Ear: External ear normal.      Nose: Nose normal.      Mouth/Throat:      Mouth: Mucous membranes are moist.   Eyes:      Extraocular Movements: Extraocular movements intact.      Conjunctiva/sclera: Conjunctivae normal.      Pupils: Pupils are equal, round, and reactive to light.   Cardiovascular:      Rate and Rhythm: Regular rhythm. Tachycardia present.   Pulmonary:      Breath sounds: No wheezing or rales.      Comments: Tachypnea and increased work of breathing with inspiratory and expiratory wheezes  Abdominal:      General: Abdomen is flat. There is no distension.      Palpations: Abdomen is soft.      Tenderness: There is no abdominal tenderness. There is no guarding.   Musculoskeletal:         General: Normal range of motion.      Cervical back: Normal range of motion and neck supple.      Right  lower leg: No edema.      Left lower leg: No edema.   Lymphadenopathy:      Cervical: No cervical adenopathy.   Skin:     General: Skin is warm and dry.   Neurological:      General: No focal deficit present.      Mental Status: She is alert and oriented to person, place, and time. Mental status is at baseline.      Comments: No gross focal neurologic deficits   Psychiatric:         Mood and Affect: Mood normal.         Behavior: Behavior normal.         Thought Content: Thought content normal.     LAB:  All pertinent labs reviewed and interpreted.  Results for orders placed or performed during the hospital encounter of 04/03/23   Chest XR,  PA & LAT    Impression    IMPRESSION: Small lung volumes and mild lingular atelectasis. No discrete airspace consolidation. No pleural effusion or pneumothorax.    Upper limits of normal heart size. Atherosclerosis of the thoracic aorta.    Cholecystectomy.   CT Chest Pulmonary Embolism w Contrast    Impression    IMPRESSION:  1.  No pulmonary embolus.  2.  Marked bronchial wall thickening.  3.  Small groundglass nodules in the left lower lobe either represent infectious bronchiolitis or aspiration.  4.  Stable mediastinal and hilar lymphadenopathy.   D dimer quantitative   Result Value Ref Range    D-Dimer Quantitative 1.34 (H) 0.00 - 0.50 ug/mL FEU   Symptomatic Influenza A/B, RSV, & SARS-CoV2 PCR (COVID-19) Nasopharyngeal    Specimen: Nasopharyngeal; Swab   Result Value Ref Range    Influenza A PCR Negative Negative    Influenza B PCR Negative Negative    RSV PCR Negative Negative    SARS CoV2 PCR Negative Negative   Comprehensive metabolic panel   Result Value Ref Range    Sodium 134 (L) 136 - 145 mmol/L    Potassium 4.4 3.5 - 5.0 mmol/L    Chloride 102 98 - 107 mmol/L    Carbon Dioxide (CO2) 21 (L) 22 - 31 mmol/L    Anion Gap 11 5 - 18 mmol/L    Urea Nitrogen 15 8 - 22 mg/dL    Creatinine 1.09 0.60 - 1.10 mg/dL    Calcium 9.7 8.5 - 10.5 mg/dL    Glucose 138 (H) 70 - 125  mg/dL    Alkaline Phosphatase 87 45 - 120 U/L    AST 19 0 - 40 U/L    ALT 19 0 - 45 U/L    Protein Total 7.8 6.0 - 8.0 g/dL    Albumin 4.0 3.5 - 5.0 g/dL    Bilirubin Total 0.9 0.0 - 1.0 mg/dL    GFR Estimate 61 >60 mL/min/1.73m2   B-Type Natriuretic Peptide ([facility] Only)   Result Value Ref Range    BNP 31 0 - 74 pg/mL   Result Value Ref Range    Troponin I <0.01 0.00 - 0.29 ng/mL   CBC with platelets and differential   Result Value Ref Range    WBC Count 5.8 4.0 - 11.0 10e3/uL    RBC Count 3.71 (L) 3.80 - 5.20 10e6/uL    Hemoglobin 12.2 11.7 - 15.7 g/dL    Hematocrit 34.3 (L) 35.0 - 47.0 %    MCV 93 78 - 100 fL    MCH 32.9 26.5 - 33.0 pg    MCHC 35.6 31.5 - 36.5 g/dL    RDW 12.1 10.0 - 15.0 %    Platelet Count 221 150 - 450 10e3/uL    % Neutrophils 68 %    % Lymphocytes 18 %    % Monocytes 12 %    % Eosinophils 1 %    % Basophils 1 %    % Immature Granulocytes 0 %    NRBCs per 100 WBC 0 <1 /100    Absolute Neutrophils 3.9 1.6 - 8.3 10e3/uL    Absolute Lymphocytes 1.1 0.8 - 5.3 10e3/uL    Absolute Monocytes 0.7 0.0 - 1.3 10e3/uL    Absolute Eosinophils 0.1 0.0 - 0.7 10e3/uL    Absolute Basophils 0.0 0.0 - 0.2 10e3/uL    Absolute Immature Granulocytes 0.0 <=0.4 10e3/uL    Absolute NRBCs 0.0 10e3/uL   Lactic acid whole blood   Result Value Ref Range    Lactic Acid 0.8 0.7 - 2.0 mmol/L   Blood gas venous   Result Value Ref Range    pH Venous 7.34 (L) 7.35 - 7.45    pCO2 Venous 49 35 - 50 mm Hg    pO2 Venous 20 (L) 25 - 47 mm Hg    Bicarbonate Venous 26 24 - 30 mmol/L    Base Excess/Deficit (+/-) 0.5   mmol/L    Oxyhemoglobin Venous 28.2 (L) 70.0 - 75.0 %    O2 Sat, Venous 28.8 (L) 70.0 - 75.0 %   Result Value Ref Range    Magnesium 1.1 (L) 1.8 - 2.6 mg/dL   ECG 12-LEAD WITH MUSE ([facility])   Result Value Ref Range    Systolic Blood Pressure 135 mmHg    Diastolic Blood Pressure 67 mmHg    Ventricular Rate 95 BPM    Atrial Rate 95 BPM    UT Interval 170 ms    QRS Duration 96 ms     ms    QTc 437 ms    P Axis 31 degrees     R AXIS -26 degrees    T Axis 5 degrees    Interpretation ECG       Sinus rhythm  Voltage criteria for left ventricular hypertrophy  Abnormal ECG  When compared with ECG of 16-FEB-2023 17:40,  Nonspecific T wave abnormality no longer evident in Anterolateral leads  Confirmed by SEE ED PROVIDER NOTE FOR, ECG INTERPRETATION ([id number]),  Belem Benavides ([id number]) on 4/4/2023 12:56:24 AM         RADIOLOGY:  Reviewed all pertinent imaging. Please see official radiology report.  CT Chest Pulmonary Embolism w Contrast   Final Result   IMPRESSION:   1.  No pulmonary embolus.   2.  Marked bronchial wall thickening.   3.  Small groundglass nodules in the left lower lobe either represent infectious bronchiolitis or aspiration.   4.  Stable mediastinal and hilar lymphadenopathy.      Chest XR,  PA & LAT   Final Result   IMPRESSION: Small lung volumes and mild lingular atelectasis. No discrete airspace consolidation. No pleural effusion or pneumothorax.      Upper limits of normal heart size. Atherosclerosis of the thoracic aorta.      Cholecystectomy.        Donte Perez M.D.  Emergency Medicine  [facility] EMERGENCY ROOM  [address] [place] [id number] [phone]  Dept: [phone]     Donte Perez MD  04/04/23 0201

## 2023-04-04 NOTE — PHARMACY-ADMISSION MEDICATION HISTORY
Pharmacy Intern Admission Medication History    Admission medication history is complete. The information provided in this note is only as accurate as the sources available at the time of the update.    Medication reconciliation/reorder completed by provider prior to medication history? Yes    Information Source(s): Patient via in-person       Allergies reviewed with patient and updates made in EHR: yes    Medication History Completed By: Pipo Calle 4/4/2023 7:46 AM    PTA Med List   Medication Sig Last Dose     acetaminophen (TYLENOL) 325 MG tablet Take 650 mg by mouth every 6 hours as needed for mild pain 4/2/2023 at prn     albuterol (PROAIR HFA/PROVENTIL HFA/VENTOLIN HFA) 108 (90 Base) MCG/ACT inhaler Inhale 2 puffs into the lungs every 6 hours as needed for shortness of breath / dyspnea or wheezing 4/3/2023 at prn     amLODIPine (NORVASC) 5 MG tablet Take 5 mg by mouth daily 4/3/2023 at am     carvedilol (COREG) 3.125 MG tablet Take 2 tablets (6.25 mg) by mouth 2 times daily (with meals) Past Week at pm     cyclobenzaprine (FLEXERIL) 5 MG tablet Take 1-2 tablets (5-10 mg) by mouth 2 times daily as needed for muscle spasms Past Week at prn     furosemide (LASIX) 40 MG tablet Take 1 tablet (40 mg) by mouth daily 4/2/2023 at am     lisinopril (ZESTRIL) 40 MG tablet Take 40 mg by mouth daily 4/3/2023 at am     metFORMIN (GLUCOPHAGE-XR) 500 MG 24 hr tablet Take 1 tablet (500 mg) by mouth daily (with dinner) 4/3/2023 at am     nitroGLYcerin (NITROSTAT) 0.4 MG sublingual tablet Place 1 tablet (0.4 mg) under the tongue every 5 minutes as needed for chest pain If you are still having symptoms after 3 doses (15 minutes) call 911. Unknown at prn     omeprazole (PRILOSEC) 20 MG DR capsule Take 1 capsule (20 mg) by mouth daily 4/3/2023 at am     STATIN NOT PRESCRIBED, INTENTIONAL, Statin not prescribed intentionally due to Other LDL wnl, ASCVD <7.5% (This option does not exclude patient from measure) 4/3/2023 at am

## 2023-04-05 ENCOUNTER — APPOINTMENT (OUTPATIENT)
Dept: SPEECH THERAPY | Facility: CLINIC | Age: 53
DRG: 190 | End: 2023-04-05
Attending: INTERNAL MEDICINE
Payer: COMMERCIAL

## 2023-04-05 LAB
ALBUMIN SERPL-MCNC: 3.3 G/DL (ref 3.5–5)
ALP SERPL-CCNC: 66 U/L (ref 45–120)
ALT SERPL W P-5'-P-CCNC: 18 U/L (ref 0–45)
ANION GAP SERPL CALCULATED.3IONS-SCNC: 11 MMOL/L (ref 5–18)
ANION GAP SERPL CALCULATED.3IONS-SCNC: 8 MMOL/L (ref 5–18)
AST SERPL W P-5'-P-CCNC: 19 U/L (ref 0–40)
BASE EXCESS BLDV CALC-SCNC: -0.9 MMOL/L
BILIRUB SERPL-MCNC: 0.5 MG/DL (ref 0–1)
BNP SERPL-MCNC: 56 PG/ML (ref 0–74)
BUN SERPL-MCNC: 31 MG/DL (ref 8–22)
BUN SERPL-MCNC: 32 MG/DL (ref 8–22)
CALCIUM SERPL-MCNC: 9.3 MG/DL (ref 8.5–10.5)
CALCIUM SERPL-MCNC: 9.4 MG/DL (ref 8.5–10.5)
CHLORIDE BLD-SCNC: 104 MMOL/L (ref 98–107)
CHLORIDE BLD-SCNC: 105 MMOL/L (ref 98–107)
CO2 SERPL-SCNC: 20 MMOL/L (ref 22–31)
CO2 SERPL-SCNC: 23 MMOL/L (ref 22–31)
CREAT SERPL-MCNC: 1.21 MG/DL (ref 0.6–1.1)
CREAT SERPL-MCNC: 1.44 MG/DL (ref 0.6–1.1)
ERYTHROCYTE [DISTWIDTH] IN BLOOD BY AUTOMATED COUNT: 12 % (ref 10–15)
GFR SERPL CREATININE-BSD FRML MDRD: 44 ML/MIN/1.73M2
GFR SERPL CREATININE-BSD FRML MDRD: 54 ML/MIN/1.73M2
GLUCOSE BLD-MCNC: 190 MG/DL (ref 70–125)
GLUCOSE BLD-MCNC: 227 MG/DL (ref 70–125)
GLUCOSE BLDC GLUCOMTR-MCNC: 188 MG/DL (ref 70–99)
GLUCOSE BLDC GLUCOMTR-MCNC: 202 MG/DL (ref 70–99)
GLUCOSE BLDC GLUCOMTR-MCNC: 225 MG/DL (ref 70–99)
GLUCOSE BLDC GLUCOMTR-MCNC: 310 MG/DL (ref 70–99)
GLUCOSE BLDC GLUCOMTR-MCNC: 341 MG/DL (ref 70–99)
GLUCOSE BLDC GLUCOMTR-MCNC: 343 MG/DL (ref 70–99)
HCO3 BLDV-SCNC: 25 MMOL/L (ref 24–30)
HCT VFR BLD AUTO: 30.3 % (ref 35–47)
HGB BLD-MCNC: 10.1 G/DL (ref 11.7–15.7)
LACTATE SERPL-SCNC: 1.5 MMOL/L (ref 0.7–2)
LACTATE SERPL-SCNC: 2.4 MMOL/L (ref 0.7–2)
MAGNESIUM SERPL-MCNC: 1.7 MG/DL (ref 1.8–2.6)
MCH RBC QN AUTO: 32.3 PG (ref 26.5–33)
MCHC RBC AUTO-ENTMCNC: 33.3 G/DL (ref 31.5–36.5)
MCV RBC AUTO: 97 FL (ref 78–100)
MRSA DNA SPEC QL NAA+PROBE: POSITIVE
OXYHGB MFR BLDV: 64.6 % (ref 70–75)
PCO2 BLDV: 47 MM HG (ref 35–50)
PH BLDV: 7.33 [PH] (ref 7.35–7.45)
PLATELET # BLD AUTO: 181 10E3/UL (ref 150–450)
PO2 BLDV: 35 MM HG (ref 25–47)
POTASSIUM BLD-SCNC: 4.5 MMOL/L (ref 3.5–5)
POTASSIUM BLD-SCNC: 4.8 MMOL/L (ref 3.5–5)
PROT SERPL-MCNC: 6.7 G/DL (ref 6–8)
RBC # BLD AUTO: 3.13 10E6/UL (ref 3.8–5.2)
SA TARGET DNA: POSITIVE
SAO2 % BLDV: 65.9 % (ref 70–75)
SODIUM SERPL-SCNC: 135 MMOL/L (ref 136–145)
SODIUM SERPL-SCNC: 136 MMOL/L (ref 136–145)
WBC # BLD AUTO: 7.4 10E3/UL (ref 4–11)

## 2023-04-05 PROCEDURE — 83735 ASSAY OF MAGNESIUM: CPT | Performed by: INTERNAL MEDICINE

## 2023-04-05 PROCEDURE — 80048 BASIC METABOLIC PNL TOTAL CA: CPT | Performed by: INTERNAL MEDICINE

## 2023-04-05 PROCEDURE — 36415 COLL VENOUS BLD VENIPUNCTURE: CPT | Performed by: INTERNAL MEDICINE

## 2023-04-05 PROCEDURE — 83605 ASSAY OF LACTIC ACID: CPT | Performed by: INTERNAL MEDICINE

## 2023-04-05 PROCEDURE — 250N000013 HC RX MED GY IP 250 OP 250 PS 637: Performed by: INTERNAL MEDICINE

## 2023-04-05 PROCEDURE — 94640 AIRWAY INHALATION TREATMENT: CPT

## 2023-04-05 PROCEDURE — 99232 SBSQ HOSP IP/OBS MODERATE 35: CPT | Performed by: INTERNAL MEDICINE

## 2023-04-05 PROCEDURE — 94640 AIRWAY INHALATION TREATMENT: CPT | Mod: 76

## 2023-04-05 PROCEDURE — 250N000011 HC RX IP 250 OP 636: Performed by: INTERNAL MEDICINE

## 2023-04-05 PROCEDURE — 82805 BLOOD GASES W/O2 SATURATION: CPT | Performed by: INTERNAL MEDICINE

## 2023-04-05 PROCEDURE — 258N000003 HC RX IP 258 OP 636: Performed by: INTERNAL MEDICINE

## 2023-04-05 PROCEDURE — 250N000009 HC RX 250: Performed by: INTERNAL MEDICINE

## 2023-04-05 PROCEDURE — 250N000012 HC RX MED GY IP 250 OP 636 PS 637: Performed by: INTERNAL MEDICINE

## 2023-04-05 PROCEDURE — 87077 CULTURE AEROBIC IDENTIFY: CPT | Performed by: INTERNAL MEDICINE

## 2023-04-05 PROCEDURE — 999N000157 HC STATISTIC RCP TIME EA 10 MIN

## 2023-04-05 PROCEDURE — 92610 EVALUATE SWALLOWING FUNCTION: CPT | Mod: GN

## 2023-04-05 PROCEDURE — 87641 MR-STAPH DNA AMP PROBE: CPT | Performed by: INTERNAL MEDICINE

## 2023-04-05 PROCEDURE — 85027 COMPLETE CBC AUTOMATED: CPT | Performed by: INTERNAL MEDICINE

## 2023-04-05 PROCEDURE — 120N000001 HC R&B MED SURG/OB

## 2023-04-05 RX ORDER — BENZONATATE 100 MG/1
100 CAPSULE ORAL ONCE
Status: COMPLETED | OUTPATIENT
Start: 2023-04-05 | End: 2023-04-05

## 2023-04-05 RX ORDER — MAGNESIUM OXIDE 400 MG/1
400 TABLET ORAL EVERY 4 HOURS
Status: COMPLETED | OUTPATIENT
Start: 2023-04-05 | End: 2023-04-05

## 2023-04-05 RX ORDER — BENZONATATE 100 MG/1
200 CAPSULE ORAL 3 TIMES DAILY
Status: DISCONTINUED | OUTPATIENT
Start: 2023-04-05 | End: 2023-04-07 | Stop reason: HOSPADM

## 2023-04-05 RX ORDER — METHYLPREDNISOLONE SODIUM SUCCINATE 40 MG/ML
40 INJECTION, POWDER, LYOPHILIZED, FOR SOLUTION INTRAMUSCULAR; INTRAVENOUS EVERY 8 HOURS
Status: DISCONTINUED | OUTPATIENT
Start: 2023-04-05 | End: 2023-04-06

## 2023-04-05 RX ADMIN — BENZONATATE 200 MG: 100 CAPSULE ORAL at 13:34

## 2023-04-05 RX ADMIN — Medication 400 MG: at 13:34

## 2023-04-05 RX ADMIN — IPRATROPIUM BROMIDE 0.5 MG: 0.5 SOLUTION RESPIRATORY (INHALATION) at 20:41

## 2023-04-05 RX ADMIN — Medication 400 MG: at 11:23

## 2023-04-05 RX ADMIN — PIPERACILLIN AND TAZOBACTAM 3.38 G: 3; .375 INJECTION, POWDER, LYOPHILIZED, FOR SOLUTION INTRAVENOUS at 09:01

## 2023-04-05 RX ADMIN — LEVALBUTEROL HYDROCHLORIDE 1.25 MG: 1.25 SOLUTION RESPIRATORY (INHALATION) at 20:42

## 2023-04-05 RX ADMIN — CARVEDILOL 6.25 MG: 6.25 TABLET, FILM COATED ORAL at 17:14

## 2023-04-05 RX ADMIN — PIPERACILLIN AND TAZOBACTAM 3.38 G: 3; .375 INJECTION, POWDER, LYOPHILIZED, FOR SOLUTION INTRAVENOUS at 16:51

## 2023-04-05 RX ADMIN — ACETAMINOPHEN 650 MG: 325 TABLET ORAL at 09:08

## 2023-04-05 RX ADMIN — METHYLPREDNISOLONE SODIUM SUCCINATE 40 MG: 40 INJECTION, POWDER, FOR SOLUTION INTRAMUSCULAR; INTRAVENOUS at 11:23

## 2023-04-05 RX ADMIN — PREDNISONE 40 MG: 20 TABLET ORAL at 09:00

## 2023-04-05 RX ADMIN — GUAIFENESIN 10 ML: 100 SOLUTION ORAL at 16:51

## 2023-04-05 RX ADMIN — METHYLPREDNISOLONE SODIUM SUCCINATE 40 MG: 40 INJECTION, POWDER, FOR SOLUTION INTRAMUSCULAR; INTRAVENOUS at 20:51

## 2023-04-05 RX ADMIN — PIPERACILLIN AND TAZOBACTAM 3.38 G: 3; .375 INJECTION, POWDER, LYOPHILIZED, FOR SOLUTION INTRAVENOUS at 00:40

## 2023-04-05 RX ADMIN — IPRATROPIUM BROMIDE 0.5 MG: 0.5 SOLUTION RESPIRATORY (INHALATION) at 15:30

## 2023-04-05 RX ADMIN — IPRATROPIUM BROMIDE 0.5 MG: 0.5 SOLUTION RESPIRATORY (INHALATION) at 08:05

## 2023-04-05 RX ADMIN — PANTOPRAZOLE SODIUM 40 MG: 40 TABLET, DELAYED RELEASE ORAL at 09:00

## 2023-04-05 RX ADMIN — Medication 1 MG: at 01:10

## 2023-04-05 RX ADMIN — BENZONATATE 200 MG: 100 CAPSULE ORAL at 11:24

## 2023-04-05 RX ADMIN — LEVALBUTEROL HYDROCHLORIDE 1.25 MG: 1.25 SOLUTION RESPIRATORY (INHALATION) at 15:30

## 2023-04-05 RX ADMIN — VANCOMYCIN HYDROCHLORIDE 1750 MG: 5 INJECTION, POWDER, LYOPHILIZED, FOR SOLUTION INTRAVENOUS at 13:21

## 2023-04-05 RX ADMIN — BENZONATATE 100 MG: 100 CAPSULE ORAL at 00:43

## 2023-04-05 RX ADMIN — IPRATROPIUM BROMIDE 0.5 MG: 0.5 SOLUTION RESPIRATORY (INHALATION) at 00:30

## 2023-04-05 RX ADMIN — LEVALBUTEROL HYDROCHLORIDE 1.25 MG: 1.25 SOLUTION RESPIRATORY (INHALATION) at 04:14

## 2023-04-05 RX ADMIN — BENZONATATE 100 MG: 100 CAPSULE ORAL at 04:48

## 2023-04-05 RX ADMIN — ACETAMINOPHEN 650 MG: 325 TABLET ORAL at 17:19

## 2023-04-05 RX ADMIN — GUAIFENESIN 10 ML: 100 SOLUTION ORAL at 08:58

## 2023-04-05 RX ADMIN — LEVALBUTEROL HYDROCHLORIDE 1.25 MG: 1.25 SOLUTION RESPIRATORY (INHALATION) at 00:29

## 2023-04-05 RX ADMIN — IPRATROPIUM BROMIDE 0.5 MG: 0.5 SOLUTION RESPIRATORY (INHALATION) at 04:27

## 2023-04-05 RX ADMIN — CARVEDILOL 6.25 MG: 6.25 TABLET, FILM COATED ORAL at 08:59

## 2023-04-05 RX ADMIN — IPRATROPIUM BROMIDE 0.5 MG: 0.5 SOLUTION RESPIRATORY (INHALATION) at 11:38

## 2023-04-05 RX ADMIN — BENZONATATE 200 MG: 100 CAPSULE ORAL at 20:51

## 2023-04-05 RX ADMIN — LEVALBUTEROL HYDROCHLORIDE 1.25 MG: 1.25 SOLUTION RESPIRATORY (INHALATION) at 11:39

## 2023-04-05 RX ADMIN — LEVALBUTEROL HYDROCHLORIDE 1.25 MG: 1.25 SOLUTION RESPIRATORY (INHALATION) at 08:05

## 2023-04-05 RX ADMIN — GUAIFENESIN 10 ML: 100 SOLUTION ORAL at 02:26

## 2023-04-05 ASSESSMENT — ACTIVITIES OF DAILY LIVING (ADL)
ADLS_ACUITY_SCORE: 23
ADLS_ACUITY_SCORE: 23
ADLS_ACUITY_SCORE: 22
ADLS_ACUITY_SCORE: 22
ADLS_ACUITY_SCORE: 23
ADLS_ACUITY_SCORE: 22
ADLS_ACUITY_SCORE: 22

## 2023-04-05 NOTE — PLAN OF CARE
"  Problem: Plan of Care - These are the overarching goals to be used throughout the patient stay.    Goal: Patient-Specific Goal (Individualized)  Description: You can add care plan individualizations to a care plan. Examples of Individualization might be:  \"Parent requests to be called daily at 9am for status\", \"I have a hard time hearing out of my right ear\", or \"Do not touch me to wake me up as it startles me\".  Outcome: Progressing   Goal Outcome Evaluation:       Respiratory maintain adequate exchange and cough to diminish. O2, nebs, cough syrup, cool environment, ice chips                 "

## 2023-04-05 NOTE — PROVIDER NOTIFICATION
Provider notified that all labs are back. Informed lactic back at 1.5.     Update 0354: Order for bolus fluids discontinued.

## 2023-04-05 NOTE — PLAN OF CARE
Problem: Plan of Care - These are the overarching goals to be used throughout the patient stay.    Goal: Plan of Care Review  Description: The Plan of Care Review/Shift note should be completed every shift.  The Outcome Evaluation is a brief statement about your assessment that the patient is improving, declining, or no change.  This information will be displayed automatically on your shift note.  Outcome: Progressing     Problem: Gas Exchange Impaired  Goal: Optimal Gas Exchange  Outcome: Progressing     Problem: Hyperglycemia  Goal: Blood Glucose Level Within Targeted Range  Outcome: Progressing    Patient is alert and oriented times four. She denied pain when asked. At start of shift patient having continual coughing episode with 0 relief. Received one time additional dose of tessalon pearls.  Also given tonight Robitussin and additional prn dose of tessalon pearls for cough. RT administered scheduled nebs. Sepsis protocol also triggered at start of shift (see PN). Received IV antibiotics and is saline locked between administrations. On K+ and mag protocols with rechecks scheduled for this AM. Continues on telemetry and has been NSR. BS at 0100 188. On 2LPM of oxygen via NC to keep sats above 90%. Bed alarm in place for patient 's safety.

## 2023-04-05 NOTE — PROGRESS NOTES
Pt given Xop/Atrovent nebs as ordered.  Pt on 203 LPM NC, o2 sats low to mid 90's, RR 20-24, BS diminished, upper airway wheeze, crackles on Left.  Pt continues to have frequent cough  Will continue to monitor pt.

## 2023-04-05 NOTE — PROGRESS NOTES
"Clinical Swallow Evaluation with Speech Pathology     04/05/23 1140   Appointment Info   Signing Clinician's Name / Credentials (SLP) Mayte Najera MA, CCC-SLP   General Information   Onset of Illness/Injury or Date of Surgery 04/03/23   Referring Physician Ncik Adrian,    Pertinent History of Current Problem Per provider progress note this AM, \"52-year-old female with history of obesity, essential hypertension, suspected COPD but not confirmed by PFT, chronic heart failure with reduced ejection fraction, diabetes mellitus type 2, adjustment disorder and depression, polysubstance abuse including cocaine abuse and tobacco abuse, peptic ulcer disease, anxiety, and gastroesophageal reflux disease presenting with acute respiratory failure with hypoxia and COPD exacerbation.  CT scan consistent with acute bronchitis bronchiolitis.\"   General Observations Patient finishing neb treatment with RT upon SLP's arrival. Patient reports feeling very tired today.   Type of Evaluation   Type of Evaluation Swallow Evaluation   Oral Motor   Oral Musculature generally intact   Mucosal Quality adequate   Dentition (Oral Motor)   Comment, Dentition (Oral Motor) Patient is missing almost all of her upper dentition. Lower dentition appears grossly intact, though incisors are worn down.   Dentition (Oral Motor) significant number of missing teeth   Facial Symmetry (Oral Motor)   Facial Symmetry (Oral Motor) WNL   Lip Function (Oral Motor)   Lip Range of Motion (Oral Motor) WNL   Lip Strength (Oral Motor) WNL   Tongue Function (Oral Motor)   Tongue Strength (Oral Motor) WNL   Tongue Coordination/Speed (Oral Motor) WNL   Tongue ROM (Oral Motor) WNL   Jaw Function (Oral Motor)   Jaw Function (Oral Motor) WNL   Cough/Swallow/Gag Reflex (Oral Motor)   Volitional Throat Clear/Cough (Oral Motor) WNL   Volitional Swallow (Oral Motor) WNL   Comment, Cough/Swallow/Gag Reflex (Oral Motor) Patient with frequent dry cough throughout " session.   Vocal Quality/Secretion Management (Oral Motor)   Vocal Quality (Oral Motor) WFL;hoarse   Secretion Management (Oral Motor) WNL   Comment, Vocal Quality/Secretion Management (Oral Motor) Patient's vocal quality was clear. Mild vocal hoarseness noted, presumably related to frequent cough.   General Swallowing Observations   Past History of Dysphagia None per patient report. None per cursory review of previous records.   Respiratory Support (General Swallowing Observations) nasal cannula  (2 LPM)   Current Diet/Method of Nutritional Intake (General Swallowing Observations, NIS) regular diet;thin liquids (level 0)  (Moderate Consistent Carb (60 g CHO per Meal) Diet)   Swallowing Evaluation Clinical swallow evaluation   Clinical Swallow Evaluation   Feeding Assistance no assistance needed   Clinical Swallow Evaluation Textures Trialed thin liquids;solid foods   Clinical Swallow Eval: Thin Liquid Texture Trial   Mode of Presentation, Thin Liquids cup;straw;self-fed   Volume of Liquid or Food Presented ~6 ounces   Oral Phase of Swallow WFL   Pharyngeal Phase of Swallow intact  (Audible swallow sounds noted with sips taken by straw. No overt s/sx of aspiration noted.)   Diagnostic Statement Subjectively, swallow response appeared timely. Patient presented with audible swallow sounds when drinking from a straw, suggestive of possible discoordination. No overt clinical s/sx of aspiration observed.   Clinical Swallow Evaluation: Solid Food Texture Trial   Mode of Presentation self-fed   Volume Presented 6 bites   Oral Phase WFL   Pharyngeal Phase intact  (No overt s/sx of aspiration)   Diagnostic Statement Patient demonstrated effective and timely mastication of a regular texture solid despite lack of upper dentition. No oral stasis noted after swallows. No overt clinical s/sx of aspiration observed.   Esophageal Phase of Swallow   Patient reports or presents with symptoms of esophageal dysphagia No   Swallowing  "Recommendations   Diet Consistency Recommendations regular diet;thin liquids (level 0)   Supervision Level for Intake patient independent   Mode of Delivery Recommendations bolus size, small;slow rate of intake   Monitoring/Assistance Required (Eating/Swallowing) stop eating activities when fatigue is present;monitor for cough or change in vocal quality with intake   Recommended Feeding/Eating Techniques (Swallow Eval) maintain upright sitting position for eating   Medication Administration Recommendations, Swallowing (SLP) As tolerated   Instrumental Assessment Recommendations instrumental evaluation not recommended at this time   Clinical Impression   Criteria for Skilled Therapeutic Interventions Met (SLP Eval) Evaluation only   Clinical Impression Comments   Clinical swallow evaluation completed per MD order. No oral dysphagia observed. Pharyngeal dysphagia is not suspected due to absence of clinical s/sx of aspiration, as well as no prior history of dysphagia. At this time, patient appears appropriate to continue current diet of Regular textures and thin liquids with use of standard safe swallowing precautions.     Chest CT from 4/3/23 shows the following: \"Small groundglass nodules in the left lower lobe either represent infectious bronchiolitis or aspiration\". Cannot rule out possible silent aspiration with swallow evaluation at bedside, however, involvement of right lower lobe would be more suggestive of aspiration. This seems unlikely in patient's case, but if silent aspiration is a concern, please consider video swallow study.      SLP Total Evaluation Time   Eval: oral/pharyngeal swallow function, clinical swallow Minutes (93162) 12   SLP Discharge Planning   SLP Plan No Speech Therapy needs at this time. Will complete current order.   SLP Discharge Recommendation home   SLP Rationale for DC Rec Swallow function appears intact.   SLP Brief overview of current status  Continue current diet of Regular " textures. Patient to follow standard safe swallowing precautions, including sitting fully upright for all intake, eating slowly, and taking small bites/sips. Please re-consult Speech Therapy if new concerns arise.   Total Session Time   Total Session Time (sum of timed and untimed services) 12

## 2023-04-05 NOTE — PROGRESS NOTES
[facility]    Medicine Progress Note - Hospitalist Service    Date of Admission:  4/3/2023    Assessment & Plan   52-year-old female with history of obesity, essential hypertension, suspected COPD but not confirmed by PFT, chronic heart failure with reduced ejection fraction, diabetes mellitus type 2, adjustment disorder and depression, polysubstance abuse including cocaine abuse and tobacco abuse, peptic ulcer disease, anxiety, and gastroesophageal reflux disease presenting with acute respiratory failure with hypoxia and COPD exacerbation.  CT scan consistent with acute bronchitis bronchiolitis.    Acute COPD exacerbation.  Acute respiratory failure with hypoxia  Acute bronchitis, bronchiolitis.  Elevated D-dimer  Recent treatment for multifocal pneumonia in January 2023  SARS-CoV-2 PCR, influenza AMB and RSV negative.  VBG PCO2 normal at 44, pH 7.32.  CT chest PE protocol no PE.  Continue azithromycin 500 mg IV every 24 hours  Continue Zosyn 3.375 g IV every 8 hours  Order solumedrol 40 mg IV q8h.   Continue supplemental oxygen as needed  Xopenex q4h. Ipratropium q4h.   Order tessalon perles 200 mg po TID  Aspiration precaution  Speech bedside swallow evaluation.  Encourage ambulation as tolerated    Hypomagnesemia  Hypokalemia  Magnesium and potassium replaced per protocol.    SUKHJINDER on Chronic kidney disease stage II  Creatinine 1.2 baseline 1.0.  Monitor base metabolic profile daily.    Essential hypertension  PTA medication: Amlodipine 5 mg daily, carvedilol 6.25 mg twice daily, and  Hold lisinopril 40 mg daily for SUKHJINDER    Normocytic anemia  Hemoglobin 10.1 g/dL down from 12.2 g/dL on admission.  Likely dilutional.  Repeat CBC in a.m.    History of coronary artery disease, STEMI in November 2022.  Chronic heart failure with reduced ejection fraction  PTA medication: Hold furosemide 40 mg daily, monitor kidney function, electrolyte levels  Discontinue IV fluids    Uncontrolled diabetes  "mellitus type 2 with hyperglycemia  Insulin NovoLog high resistance dosing 4 times daily ACHS  PTA metformin    Elevated lactic acid level.   Resolved.    Patient does not meet sepsis criteria.   Continue treatment as above.      History of peptic ulcer disease  Continue omeprazole 20 mg daily    History of CVA with left facial droop and dysarthria    Mood disorder  Bipolar disorder  Anxiety disorder  Not currently on medication.    Tobacco abuse  Smoking cessation counseling provided.       Diet: Consistent Carbohydrate Diet Moderate Consistent Carb (60 g CHO per Meal) Diet    DVT Prophylaxis: Pneumatic Compression Devices  Sherwood Catheter: Not present  Lines: None     Cardiac Monitoring: None  Code Status: Full Code      Clinically Significant Risk Factors           # Hypercalcemia: corrected calcium is >10.1, will monitor as appropriate  # Hypomagnesemia: Lowest Mg = 1.1 mg/dL in last 2 days, will replace as needed   # Hypoalbuminemia: Lowest albumin = 3.3 g/dL at 4/4/2023 11:58 PM, will monitor as appropriate           # Obesity: Estimated body mass index is 32.63 kg/m  as calculated from the following:    Height as of this encounter: 1.549 m (5' 1\").    Weight as of this encounter: 78.3 kg (172 lb 11.2 oz)., PRESENT ON ADMISSION         Disposition Plan     Expected Discharge Date: 04/06/2023                  Nick Adrian DO  Hospitalist Service  [facility]  Securely message with [employer] (more info)  Text page via [employer] Paging/Directory   ______________________________________________________________________    Interval History   Patient's main complaint is frequent cough.  She has bilateral wheezing.  She denies chest pain at rest.  Has pleuritic pain with coughing episodes.  Denies fever or chills.  Is tolerating diet.  Currently on 2-1/2 L per nasal cannula saturating 94 to 95%.  BiPAP was not required overnight    Physical Exam   Vital Signs: Temp: 97.5  F (36.4  C) Temp src: " Axillary BP: 124/61 Pulse: 69   Resp: 18 SpO2: 93 % O2 Device: Nasal cannula Oxygen Delivery: 2 LPM  Weight: 172 lbs 11.2 oz  General Appearance:  Alert, cooperative, coughing  Head:    Normocephalic, without obvious abnormality, atraumatic  EENT:  PERRL, conjunctiva/corneas clear, EOM's intact.   Neck:    Supple, symmetrical, trachea midline, no adenopathy; no NVE  Back:  Symmetric, no curvature, no CVA tenderness  Chest/Lungs: decreased air entry, wheezing, no rales, respirations unlabored, No tenderness or deformity. No abdominal breathing or use of accessory muscles.   Heart:    Regular rate and rhythm, S1 and S2 normal, no murmur, rub   or gallop  Abdomen: Soft, non-tender, bowel sounds active all four quadrants, not peritoneal on palpation. Not distended  Extremities:  Extremities normal, atraumatic, no swelling   Skin:  Skin color, texture, turgor normal, no rashes or lesion  Neurologic:  Awake and alert, no lateralizing or localizing signs     Medical Decision Making   38 MINUTES SPENT BY ME on the date of service doing chart review, history, exam, documentation & further activities per the note.      Data     I have personally reviewed the following data over the past 24 hrs:    7.4  \   10.1 (L)   / 181     136 105 31 (H) /  190 (H)   4.5 23 1.21 (H) \       ALT: 18 AST: 19 AP: 66 TBILI: 0.5   ALB: 3.3 (L) TOT PROTEIN: 6.7 LIPASE: N/A       Trop: N/A BNP: 56       Procal: N/A CRP: N/A Lactic Acid: 1.5         Imaging results reviewed over the past 24 hrs:   No results found for this or any previous visit (from the past 24 hour(s)).

## 2023-04-05 NOTE — PROGRESS NOTES
Care Management Follow Up    Length of Stay (days): 1    Expected Discharge Date: 04/06/2023     Concerns to be Addressed:     Discharge needs  Additional Information:  Cm reviewed chart. Patient independent. Anticipate patient will discharge home with family support. Family to provide a ride at discharge.  CM avail if needs arise.      Geri Jean RN

## 2023-04-05 NOTE — PHARMACY-VANCOMYCIN DOSING SERVICE
Pharmacy Vancomycin Initial Note  Date of Service 2023  Patient's  1970  52 year old, female    Indication: Community Acquired Pneumonia    Current estimated CrCl = Estimated Creatinine Clearance: 52.1 mL/min (A) (based on SCr of 1.21 mg/dL (H)).    Creatinine for last 3 days  4/3/2023:  8:26 PM Creatinine 1.09 mg/dL  2023:  6:22 AM Creatinine 1.20 mg/dL; 11:58 PM Creatinine 1.44 mg/dL  2023:  9:39 AM Creatinine 1.21 mg/dL    Recent Vancomycin Level(s) for last 3 days  No results found for requested labs within last 3 days.      Vancomycin IV Administrations (past 72 hours)      No vancomycin orders with administrations in past 72 hours.                Nephrotoxins and other renal medications (From now, onward)    Start     Dose/Rate Route Frequency Ordered Stop    23 1030  [Held by provider]  furosemide (LASIX) tablet 40 mg        (Held by provider since 2023 at 1007 by Nick Adrian DO.Hold Reason: Abnormal Electrolytes)   Note to Pharmacy: PTA Sig:Take 1 tablet (40 mg) by mouth daily      40 mg Oral DAILY 23 1007      23 1030  [Held by provider]  lisinopril (ZESTRIL) tablet 40 mg        (Held by provider since 2023 at 1007 by Nick Adrian DO.Hold Reason: Abnormal Electrolytes)   Note to Pharmacy: PTA Sig:Take 40 mg by mouth daily      40 mg Oral DAILY 23 1007      23 0839  piperacillin-tazobactam (ZOSYN) 3.375 g vial to attach to  mL bag        Note to Pharmacy: Extended infusion dosing to start 6 hours after initial infusion.   See Hyperspace for full Linked Orders Report.    3.375 g  over 240 Minutes Intravenous EVERY 8 HOURS 23 0239            Contrast Orders - past 72 hours (72h ago, onward)    Start     Dose/Rate Route Frequency Stop    23 213  iopamidol (ISOVUE-370) solution 100 mL         100 mL Intravenous ONCE 238          [employer] Prediction of Planned Initial Vancomycin Regimen  Loading  dose: 1750 mg at 1200 04/05/2023.  Regimen: 1500 mg IV every 24 hours.  Start time: 1200 on 04/05/2023  Exposure target: AUC24 (range)400-600 mg/L.hr   AUC24,ss: 509 mg/L.hr  Probability of AUC24 > 400: 76 %  Ctrough,ss: 14.6 mg/L  Probability of Ctrough,ss > 20: 24 %  Probability of nephrotoxicity (Lodise GIOVANNI 2009): 10 %          Plan:  1. Start vancomycin  1750mg iv load x1 then 1500mg iv q24h.   2. Vancomycin monitoring method: AUC  3. Vancomycin therapeutic monitoring goal: 400-600 mg*h/L  4. Pharmacy will check vancomycin levels as appropriate in 1-3 Days.    5. Serum creatinine levels will be ordered daily for the first week of therapy and at least twice weekly for subsequent weeks.      Dario Franco [facility]

## 2023-04-05 NOTE — PROGRESS NOTES
Informed of mild lactic acid elev - 2.4    Known to me from admission    Lactic elevation - could be related to B2 agonist use. Also was on metformin and resumed. Already on antibiotics for respiratory infection - no fevers and not tachycardic    Plans - Hold metformin. Seems creatinine rising - had IV contrast on admission.     548A - Lactic acid normalized, even without fluid intervention

## 2023-04-05 NOTE — PLAN OF CARE
Problem: Gas Exchange Impaired  Goal: Optimal Gas Exchange  Outcome: Progressing   Goal Outcome Evaluation:    A&Ox4. 2L O2 via NC. Patient sleeping between cares. Robitussin given for cough. Tessalon Pearls scheduled 3x/day, and prednisone changed to Solu-Medrol. Patient's respiratory status seems to have improved from this morning. Wheezing is decreased. ACHS blood sugar checks. IV Zosyn and Vancomycin given. Both IV's flushed. VSS.

## 2023-04-05 NOTE — PROVIDER NOTIFICATION
Prior to administration of IV fluid bolus, provider notified that patient is having wheezing even after neb treatments and that lasix are currently on hold. IV fluid bolus was put on hold and labs ordered.     Update 0045: Provider notified patient requesting medication to help her sleep. Received orders for melatonin 1mg.

## 2023-04-05 NOTE — PROGRESS NOTES
Patient received scheduled nebulizer tx via mask. Tolerated well, has frequent coughing throughout tx, breath sounds are diminished bilaterally with upper airway wheezing. On 2L NC SAT 94%. No Bipap was used during the night. Will continue to follow as needed.

## 2023-04-06 LAB
ANION GAP SERPL CALCULATED.3IONS-SCNC: 8 MMOL/L (ref 5–18)
BUN SERPL-MCNC: 25 MG/DL (ref 8–22)
CALCIUM SERPL-MCNC: 9.5 MG/DL (ref 8.5–10.5)
CHLORIDE BLD-SCNC: 105 MMOL/L (ref 98–107)
CO2 SERPL-SCNC: 22 MMOL/L (ref 22–31)
CREAT SERPL-MCNC: 1.19 MG/DL (ref 0.6–1.1)
ERYTHROCYTE [DISTWIDTH] IN BLOOD BY AUTOMATED COUNT: 11.9 % (ref 10–15)
GFR SERPL CREATININE-BSD FRML MDRD: 55 ML/MIN/1.73M2
GLUCOSE BLD-MCNC: 295 MG/DL (ref 70–125)
GLUCOSE BLDC GLUCOMTR-MCNC: 244 MG/DL (ref 70–99)
GLUCOSE BLDC GLUCOMTR-MCNC: 271 MG/DL (ref 70–99)
GLUCOSE BLDC GLUCOMTR-MCNC: 285 MG/DL (ref 70–99)
GLUCOSE BLDC GLUCOMTR-MCNC: 388 MG/DL (ref 70–99)
GLUCOSE BLDC GLUCOMTR-MCNC: 583 MG/DL (ref 70–99)
HCT VFR BLD AUTO: 31 % (ref 35–47)
HGB BLD-MCNC: 10.6 G/DL (ref 11.7–15.7)
LACTATE SERPL-SCNC: 1.8 MMOL/L (ref 0.7–2)
MAGNESIUM SERPL-MCNC: 1.8 MG/DL (ref 1.8–2.6)
MCH RBC QN AUTO: 32.6 PG (ref 26.5–33)
MCHC RBC AUTO-ENTMCNC: 34.2 G/DL (ref 31.5–36.5)
MCV RBC AUTO: 95 FL (ref 78–100)
PLATELET # BLD AUTO: 207 10E3/UL (ref 150–450)
POTASSIUM BLD-SCNC: 4.5 MMOL/L (ref 3.5–5)
RBC # BLD AUTO: 3.25 10E6/UL (ref 3.8–5.2)
SODIUM SERPL-SCNC: 135 MMOL/L (ref 136–145)
WBC # BLD AUTO: 7.8 10E3/UL (ref 4–11)

## 2023-04-06 PROCEDURE — 999N000157 HC STATISTIC RCP TIME EA 10 MIN

## 2023-04-06 PROCEDURE — 85027 COMPLETE CBC AUTOMATED: CPT | Performed by: INTERNAL MEDICINE

## 2023-04-06 PROCEDURE — 250N000013 HC RX MED GY IP 250 OP 250 PS 637: Performed by: INTERNAL MEDICINE

## 2023-04-06 PROCEDURE — 94640 AIRWAY INHALATION TREATMENT: CPT

## 2023-04-06 PROCEDURE — 36415 COLL VENOUS BLD VENIPUNCTURE: CPT | Performed by: INTERNAL MEDICINE

## 2023-04-06 PROCEDURE — 94640 AIRWAY INHALATION TREATMENT: CPT | Mod: 76

## 2023-04-06 PROCEDURE — 250N000009 HC RX 250: Performed by: INTERNAL MEDICINE

## 2023-04-06 PROCEDURE — 250N000011 HC RX IP 250 OP 636: Performed by: INTERNAL MEDICINE

## 2023-04-06 PROCEDURE — 120N000001 HC R&B MED SURG/OB

## 2023-04-06 PROCEDURE — 250N000012 HC RX MED GY IP 250 OP 636 PS 637

## 2023-04-06 PROCEDURE — 83735 ASSAY OF MAGNESIUM: CPT | Performed by: INTERNAL MEDICINE

## 2023-04-06 PROCEDURE — 250N000013 HC RX MED GY IP 250 OP 250 PS 637

## 2023-04-06 PROCEDURE — 99232 SBSQ HOSP IP/OBS MODERATE 35: CPT | Performed by: INTERNAL MEDICINE

## 2023-04-06 PROCEDURE — 80048 BASIC METABOLIC PNL TOTAL CA: CPT | Performed by: INTERNAL MEDICINE

## 2023-04-06 RX ORDER — PREDNISONE 20 MG/1
60 TABLET ORAL DAILY
Status: DISCONTINUED | OUTPATIENT
Start: 2023-04-06 | End: 2023-04-07

## 2023-04-06 RX ORDER — DOXYCYCLINE 100 MG/1
100 CAPSULE ORAL EVERY 12 HOURS SCHEDULED
Status: DISCONTINUED | OUTPATIENT
Start: 2023-04-06 | End: 2023-04-07 | Stop reason: HOSPADM

## 2023-04-06 RX ORDER — PREDNISONE 20 MG/1
40 TABLET ORAL DAILY
Status: DISCONTINUED | OUTPATIENT
Start: 2023-04-08 | End: 2023-04-07

## 2023-04-06 RX ORDER — CARVEDILOL 12.5 MG/1
12.5 TABLET ORAL 2 TIMES DAILY WITH MEALS
Status: DISCONTINUED | OUTPATIENT
Start: 2023-04-07 | End: 2023-04-07 | Stop reason: HOSPADM

## 2023-04-06 RX ORDER — LISINOPRIL 40 MG/1
40 TABLET ORAL DAILY
Status: DISCONTINUED | OUTPATIENT
Start: 2023-04-06 | End: 2023-04-07 | Stop reason: HOSPADM

## 2023-04-06 RX ORDER — HYDRALAZINE HYDROCHLORIDE 20 MG/ML
10 INJECTION INTRAMUSCULAR; INTRAVENOUS EVERY 6 HOURS PRN
Status: DISCONTINUED | OUTPATIENT
Start: 2023-04-06 | End: 2023-04-06

## 2023-04-06 RX ORDER — AMLODIPINE BESYLATE 5 MG/1
5 TABLET ORAL 2 TIMES DAILY
Status: DISCONTINUED | OUTPATIENT
Start: 2023-04-06 | End: 2023-04-07 | Stop reason: HOSPADM

## 2023-04-06 RX ORDER — FUROSEMIDE 40 MG
40 TABLET ORAL DAILY
Status: DISCONTINUED | OUTPATIENT
Start: 2023-04-06 | End: 2023-04-07 | Stop reason: HOSPADM

## 2023-04-06 RX ORDER — MAGNESIUM SULFATE HEPTAHYDRATE 40 MG/ML
2 INJECTION, SOLUTION INTRAVENOUS ONCE
Status: COMPLETED | OUTPATIENT
Start: 2023-04-06 | End: 2023-04-06

## 2023-04-06 RX ORDER — CLONIDINE HYDROCHLORIDE 0.1 MG/1
0.2 TABLET ORAL EVERY 4 HOURS PRN
Status: DISCONTINUED | OUTPATIENT
Start: 2023-04-06 | End: 2023-04-07

## 2023-04-06 RX ORDER — GUAIFENESIN 600 MG/1
600 TABLET, EXTENDED RELEASE ORAL 2 TIMES DAILY
Status: DISCONTINUED | OUTPATIENT
Start: 2023-04-06 | End: 2023-04-07 | Stop reason: HOSPADM

## 2023-04-06 RX ADMIN — LEVALBUTEROL HYDROCHLORIDE 1.25 MG: 1.25 SOLUTION RESPIRATORY (INHALATION) at 07:38

## 2023-04-06 RX ADMIN — IPRATROPIUM BROMIDE 0.5 MG: 0.5 SOLUTION RESPIRATORY (INHALATION) at 11:29

## 2023-04-06 RX ADMIN — MAGNESIUM SULFATE HEPTAHYDRATE 2 G: 40 INJECTION, SOLUTION INTRAVENOUS at 11:20

## 2023-04-06 RX ADMIN — LEVALBUTEROL HYDROCHLORIDE 1.25 MG: 1.25 SOLUTION RESPIRATORY (INHALATION) at 19:46

## 2023-04-06 RX ADMIN — LISINOPRIL 40 MG: 40 TABLET ORAL at 11:19

## 2023-04-06 RX ADMIN — DOXYCYCLINE 100 MG: 100 CAPSULE ORAL at 20:36

## 2023-04-06 RX ADMIN — BENZONATATE 200 MG: 100 CAPSULE ORAL at 14:04

## 2023-04-06 RX ADMIN — AMOXICILLIN AND CLAVULANATE POTASSIUM 1 TABLET: 875; 125 TABLET, FILM COATED ORAL at 11:19

## 2023-04-06 RX ADMIN — PIPERACILLIN AND TAZOBACTAM 3.38 G: 3; .375 INJECTION, POWDER, LYOPHILIZED, FOR SOLUTION INTRAVENOUS at 00:05

## 2023-04-06 RX ADMIN — CLONIDINE HYDROCHLORIDE 0.2 MG: 0.1 TABLET ORAL at 21:46

## 2023-04-06 RX ADMIN — AMLODIPINE BESYLATE 5 MG: 5 TABLET ORAL at 20:36

## 2023-04-06 RX ADMIN — ACETAMINOPHEN 650 MG: 325 TABLET ORAL at 11:34

## 2023-04-06 RX ADMIN — METHYLPREDNISOLONE SODIUM SUCCINATE 40 MG: 40 INJECTION, POWDER, FOR SOLUTION INTRAMUSCULAR; INTRAVENOUS at 04:16

## 2023-04-06 RX ADMIN — CARVEDILOL 6.25 MG: 6.25 TABLET, FILM COATED ORAL at 18:26

## 2023-04-06 RX ADMIN — IPRATROPIUM BROMIDE 0.5 MG: 0.5 SOLUTION RESPIRATORY (INHALATION) at 16:22

## 2023-04-06 RX ADMIN — BENZONATATE 200 MG: 100 CAPSULE ORAL at 09:01

## 2023-04-06 RX ADMIN — PANTOPRAZOLE SODIUM 40 MG: 40 TABLET, DELAYED RELEASE ORAL at 09:02

## 2023-04-06 RX ADMIN — BENZONATATE 200 MG: 100 CAPSULE ORAL at 20:36

## 2023-04-06 RX ADMIN — PIPERACILLIN AND TAZOBACTAM 3.38 G: 3; .375 INJECTION, POWDER, LYOPHILIZED, FOR SOLUTION INTRAVENOUS at 09:03

## 2023-04-06 RX ADMIN — HYDRALAZINE HYDROCHLORIDE 10 MG: 20 INJECTION, SOLUTION INTRAMUSCULAR; INTRAVENOUS at 18:26

## 2023-04-06 RX ADMIN — FUROSEMIDE 40 MG: 40 TABLET ORAL at 11:20

## 2023-04-06 RX ADMIN — AMLODIPINE BESYLATE 5 MG: 5 TABLET ORAL at 09:02

## 2023-04-06 RX ADMIN — GUAIFENESIN 600 MG: 600 TABLET ORAL at 20:36

## 2023-04-06 RX ADMIN — LEVALBUTEROL HYDROCHLORIDE 1.25 MG: 1.25 SOLUTION RESPIRATORY (INHALATION) at 11:29

## 2023-04-06 RX ADMIN — LEVALBUTEROL HYDROCHLORIDE 1.25 MG: 1.25 SOLUTION RESPIRATORY (INHALATION) at 16:21

## 2023-04-06 RX ADMIN — IPRATROPIUM BROMIDE 0.5 MG: 0.5 SOLUTION RESPIRATORY (INHALATION) at 04:49

## 2023-04-06 RX ADMIN — IPRATROPIUM BROMIDE 0.5 MG: 0.5 SOLUTION RESPIRATORY (INHALATION) at 07:39

## 2023-04-06 RX ADMIN — DOXYCYCLINE 100 MG: 100 CAPSULE ORAL at 11:19

## 2023-04-06 RX ADMIN — LEVALBUTEROL HYDROCHLORIDE 1.25 MG: 1.25 SOLUTION RESPIRATORY (INHALATION) at 04:49

## 2023-04-06 RX ADMIN — AMOXICILLIN AND CLAVULANATE POTASSIUM 1 TABLET: 875; 125 TABLET, FILM COATED ORAL at 20:36

## 2023-04-06 RX ADMIN — IPRATROPIUM BROMIDE 0.5 MG: 0.5 SOLUTION RESPIRATORY (INHALATION) at 19:46

## 2023-04-06 RX ADMIN — CARVEDILOL 6.25 MG: 6.25 TABLET, FILM COATED ORAL at 09:02

## 2023-04-06 RX ADMIN — PREDNISONE 60 MG: 20 TABLET ORAL at 11:19

## 2023-04-06 RX ADMIN — GUAIFENESIN 600 MG: 600 TABLET ORAL at 14:04

## 2023-04-06 ASSESSMENT — ACTIVITIES OF DAILY LIVING (ADL)
ADLS_ACUITY_SCORE: 23

## 2023-04-06 NOTE — PROGRESS NOTES
Patient received scheduled nebulizer tx via mask. Tolerated well, coughing is much improved throughout tx, breath sounds are diminished bilaterally with upper airway wheezing. On 2L NC SAT 94%. No Bipap was used during the night. Will continue to follow as needed.

## 2023-04-06 NOTE — PROGRESS NOTES
Pt seen on RA, breath sounds diminished with occasional end exp wheezes.     Pt getting Xopenex and Atrovent QID. RT will continue to monitor.    Tena Cobos, RT

## 2023-04-06 NOTE — PLAN OF CARE
Goal Outcome Evaluation:  Patient agitated with routine vital monitoring, labs draws, med admin, etc   Was able to be redirected from increasing frustration after lab draw at 0000, that elevated her frustration.   Vitally stable.  Lisa Ndiaye RN

## 2023-04-06 NOTE — PROGRESS NOTES
[facility]    Medicine Progress Note - Hospitalist Service    Date of Admission:  4/3/2023    Assessment & Plan     52-year-old female with history of obesity, essential hypertension, suspected COPD but not confirmed by PFT, chronic heart failure with reduced ejection fraction, diabetes mellitus type 2, adjustment disorder and depression, polysubstance abuse including cocaine abuse and tobacco abuse, peptic ulcer disease, anxiety, and gastroesophageal reflux disease presenting with acute respiratory failure with hypoxia and COPD exacerbation.  CT scan consistent with acute bronchitis bronchiolitis. Oxygen weaned to room air 4/6.      Acute COPD exacerbation.  Acute respiratory failure with hypoxia  Acute bronchitis, bronchiolitis.  Elevated D-dimer  Recent treatment for multifocal pneumonia in January 2023. SARS-CoV-2 PCR, influenza AMB and RSV negative. VBG PCO2 normal at 44, pH 7.32. CT chest PE protocol no PE. Cleared by SLP.  - Discontinue vancomycin and zosyn  - Start PO Augmentin and doxycycline to complete 7 day course   - Change IV solumedrol 40 mg IV q8h to PO prednisone 60 mg x2 days then 40 mg x5 days  - Continue supplemental oxygen as needed  - Xopenex q4h. Ipratropium q4h.   - Tessalon perles 200 mg po TID  - Mucinex 600 mg po BID  - Encourage ambulation     Hypomagnesemia  Hypokalemia  - Magnesium and potassium replaced per protocol.     SUKHJINDER on Chronic kidney disease stage II, improving  Creatinine 1.2 baseline 1.0.  -Monitor BMP  -Restart PTA lisinopril      Essential hypertension  PTA medication: Amlodipine 5 mg daily, carvedilol 6.25 mg twice daily, and lisinopril 40 mg  -Restart lisinopril 40 mg daily   -Continue PTA amlodipine, carvedilol     Normocytic anemia, stable  Hemoglobin 10.6 g/dL down from 12.2 g/dL on admission.  Likely dilutional.  - Repeat CBC in a.m.     History of coronary artery disease, STEMI in November 2022.  Chronic heart failure with reduced ejection  "fraction  -Restart PTA furosemide 40 mg daily, monitor kidney function  -BMP in AM      Uncontrolled diabetes mellitus type 2 with hyperglycemia  Steroid induced hyperglycemia  Likely elevated due to treatment with steroids  -Insulin NovoLog high resistance dosing 4 times daily ACHS  -PTA metformin     Elevated lactic acid level.   Resolved.  Patient does not meet sepsis criteria.   - Continue treatment as above.       History of peptic ulcer disease  -Continue omeprazole 20 mg daily     History of CVA with left facial droop and dysarthria     Mood disorder  Bipolar disorder  Anxiety disorder  Not currently on medication.     Tobacco abuse  Smoking cessation counseling provided.     Diet: Consistent Carbohydrate Diet Moderate Consistent Carb (60 g CHO per Meal) Diet    DVT Prophylaxis: Pneumatic Compression Devices  Sherwood Catheter: Not present  Lines: None     Cardiac Monitoring: None  Code Status: Full Code      Clinically Significant Risk Factors              # Hypoalbuminemia: Lowest albumin = 3.3 g/dL at 4/4/2023 11:58 PM, will monitor as appropriate           # Obesity: Estimated body mass index is 32.63 kg/m  as calculated from the following:    Height as of this encounter: 1.549 m (5' 1\").    Weight as of this encounter: 78.3 kg (172 lb 11.2 oz)., PRESENT ON ADMISSION         Disposition Plan      Expected Discharge Date: 04/07/2023        Discharge Comments: IV ABX        The patient's care was discussed with the Attending Physician, Dr. Adrian.    Roderick Holliday MD PGY2  Hospitalist Service  [facility]  Securely message with [employer] (more info)  Text page via [employer] Paging/Directory   ______________________________________________________________________    Interval History   Overnight, patient frustrated with lab draws.     This morning, she reports her shortness of breathing is improving but continues to have wheezing. She feels like her mucus is thick and difficult to expel. " She denies chest pain, fevers, leg swelling.     Patient reports she is eager to go home because she takes care of her parents who are alone right now.     Physical Exam   Vital Signs: Temp: 97.5  F (36.4  C) Temp src: Oral BP: (!) 184/94 Pulse: 71   Resp: 16 SpO2: 94 % O2 Device: None (Room air) Oxygen Delivery: 2 LPM  Weight: 172 lbs 11.2 oz    Constitutional: awake, alert  Eyes: lids and lashes normal and extra-ocular muscles intact  ENT: normocepalic, without obvious abnormality, atramatic  Respiratory: On room air. Decreased breath sounds, end expiratory wheezing, no signs of respiratory distress   Cardiovascular: RRR no murmurs  GI: normal bowel sounds, soft, non-distended, non-tender  Skin: normal skin color, texture, turgor  Musculoskeletal: no lower extremity pitting edema present  Neurologic: awake and alert    Medical Decision Making    total time spent in direct patient care was 40 minutes with 50% of the floor time spent providing counseling or coordination of care (C/CC).  The patient required extensive counseling regarding disease management, lifestyle modification, and medication management.         Data   ------------------------- PAST 24 HR DATA REVIEWED -----------------------------------------------    I have personally reviewed the following data over the past 24 hrs:    7.8  \   10.6 (L)   / 207     135 (L) 105 25 (H) /  271 (H)   4.5 22 1.19 (H) \       Procal: N/A CRP: N/A Lactic Acid: 1.8         Imaging results reviewed over the past 24 hrs:   No results found for this or any previous visit (from the past 24 hour(s)).

## 2023-04-06 NOTE — PROVIDER NOTIFICATION
1710: MD paged regarding pt hypertensive. Last /95. Orders placed for prn hydralazine. (see mar) will recheck BP in 30 minutes from administration    Last BP 1830 :  200/88 cross over MD paged. Pt very frustrated with diet restrictions this evening which is adding. Now complaining of headache.

## 2023-04-07 VITALS
DIASTOLIC BLOOD PRESSURE: 76 MMHG | WEIGHT: 172.7 LBS | HEART RATE: 62 BPM | OXYGEN SATURATION: 95 % | SYSTOLIC BLOOD PRESSURE: 139 MMHG | TEMPERATURE: 97.5 F | RESPIRATION RATE: 20 BRPM | BODY MASS INDEX: 32.61 KG/M2 | HEIGHT: 61 IN

## 2023-04-07 LAB
BACTERIA SPEC CULT: ABNORMAL
CREAT SERPL-MCNC: 0.89 MG/DL (ref 0.6–1.1)
GFR SERPL CREATININE-BSD FRML MDRD: 78 ML/MIN/1.73M2
GLUCOSE BLDC GLUCOMTR-MCNC: 276 MG/DL (ref 70–99)
GLUCOSE BLDC GLUCOMTR-MCNC: 289 MG/DL (ref 70–99)
GLUCOSE BLDC GLUCOMTR-MCNC: 305 MG/DL (ref 70–99)
GLUCOSE BLDC GLUCOMTR-MCNC: 356 MG/DL (ref 70–99)
GLUCOSE BLDC GLUCOMTR-MCNC: 360 MG/DL (ref 70–99)
HOLD SPECIMEN: NORMAL
MAGNESIUM SERPL-MCNC: 1.7 MG/DL (ref 1.8–2.6)
POTASSIUM BLD-SCNC: 4 MMOL/L (ref 3.5–5)

## 2023-04-07 PROCEDURE — 82565 ASSAY OF CREATININE: CPT | Performed by: INTERNAL MEDICINE

## 2023-04-07 PROCEDURE — 250N000012 HC RX MED GY IP 250 OP 636 PS 637: Performed by: INTERNAL MEDICINE

## 2023-04-07 PROCEDURE — 99239 HOSP IP/OBS DSCHRG MGMT >30: CPT | Performed by: INTERNAL MEDICINE

## 2023-04-07 PROCEDURE — 250N000013 HC RX MED GY IP 250 OP 250 PS 637: Performed by: INTERNAL MEDICINE

## 2023-04-07 PROCEDURE — 999N000032 HC STATISTIC CHRONIC DISEASE SPECIALIST RT CONSULT

## 2023-04-07 PROCEDURE — 999N000157 HC STATISTIC RCP TIME EA 10 MIN

## 2023-04-07 PROCEDURE — 36415 COLL VENOUS BLD VENIPUNCTURE: CPT | Performed by: INTERNAL MEDICINE

## 2023-04-07 PROCEDURE — 250N000009 HC RX 250: Performed by: INTERNAL MEDICINE

## 2023-04-07 PROCEDURE — 250N000013 HC RX MED GY IP 250 OP 250 PS 637

## 2023-04-07 PROCEDURE — 83735 ASSAY OF MAGNESIUM: CPT | Performed by: INTERNAL MEDICINE

## 2023-04-07 PROCEDURE — 94640 AIRWAY INHALATION TREATMENT: CPT

## 2023-04-07 PROCEDURE — 999N000033 HC STATISTIC CHRONIC PULMONARY DISEASE SPECIALIST

## 2023-04-07 PROCEDURE — 84132 ASSAY OF SERUM POTASSIUM: CPT | Performed by: INTERNAL MEDICINE

## 2023-04-07 PROCEDURE — 250N000011 HC RX IP 250 OP 636: Performed by: INTERNAL MEDICINE

## 2023-04-07 PROCEDURE — 99407 BEHAV CHNG SMOKING > 10 MIN: CPT

## 2023-04-07 RX ORDER — AMLODIPINE BESYLATE 5 MG/1
10 TABLET ORAL DAILY
Qty: 30 TABLET | Refills: 0 | Status: SHIPPED | OUTPATIENT
Start: 2023-04-07

## 2023-04-07 RX ORDER — GLIPIZIDE 5 MG/1
5 TABLET ORAL
Qty: 5 TABLET | Refills: 0 | Status: SHIPPED | OUTPATIENT
Start: 2023-04-08 | End: 2023-04-07

## 2023-04-07 RX ORDER — BENZONATATE 100 MG/1
100 CAPSULE ORAL 3 TIMES DAILY PRN
Qty: 21 CAPSULE | Refills: 0 | Status: SHIPPED | OUTPATIENT
Start: 2023-04-07

## 2023-04-07 RX ORDER — FLUTICASONE PROPIONATE AND SALMETEROL 250; 50 UG/1; UG/1
1 POWDER RESPIRATORY (INHALATION) EVERY 12 HOURS
Qty: 60 EACH | Refills: 0 | Status: SHIPPED | OUTPATIENT
Start: 2023-04-07

## 2023-04-07 RX ORDER — PREDNISONE 20 MG/1
40 TABLET ORAL DAILY
Qty: 8 TABLET | Refills: 0 | Status: SHIPPED | OUTPATIENT
Start: 2023-04-08

## 2023-04-07 RX ORDER — GUAIFENESIN 600 MG/1
600 TABLET, EXTENDED RELEASE ORAL 2 TIMES DAILY
Qty: 10 TABLET | Refills: 0 | Status: SHIPPED | OUTPATIENT
Start: 2023-04-07

## 2023-04-07 RX ORDER — POLYETHYLENE GLYCOL 3350 17 G
2 POWDER IN PACKET (EA) ORAL
Qty: 27 LOZENGE | Refills: 0 | Status: SHIPPED | OUTPATIENT
Start: 2023-04-07 | End: 2023-04-10

## 2023-04-07 RX ORDER — PREDNISONE 20 MG/1
40 TABLET ORAL DAILY
Qty: 8 TABLET | Refills: 0 | Status: SHIPPED | OUTPATIENT
Start: 2023-04-08 | End: 2023-04-07

## 2023-04-07 RX ORDER — GLIPIZIDE 5 MG/1
5 TABLET ORAL
Status: DISCONTINUED | OUTPATIENT
Start: 2023-04-07 | End: 2023-04-07 | Stop reason: HOSPADM

## 2023-04-07 RX ORDER — NICOTINE 21 MG/24HR
1 PATCH, TRANSDERMAL 24 HOURS TRANSDERMAL EVERY 24 HOURS
Qty: 14 PATCH | Refills: 0 | Status: SHIPPED | OUTPATIENT
Start: 2023-05-19 | End: 2023-06-02

## 2023-04-07 RX ORDER — GLIPIZIDE 5 MG/1
5 TABLET ORAL
Qty: 9 TABLET | Refills: 0 | Status: SHIPPED | OUTPATIENT
Start: 2023-04-07

## 2023-04-07 RX ORDER — DOXYCYCLINE 100 MG/1
100 CAPSULE ORAL EVERY 12 HOURS
Qty: 7 CAPSULE | Refills: 0 | Status: SHIPPED | OUTPATIENT
Start: 2023-04-07 | End: 2023-04-07

## 2023-04-07 RX ORDER — DOXYCYCLINE 100 MG/1
100 CAPSULE ORAL EVERY 12 HOURS
Qty: 7 CAPSULE | Refills: 0 | Status: SHIPPED | OUTPATIENT
Start: 2023-04-07

## 2023-04-07 RX ORDER — BENZONATATE 100 MG/1
100 CAPSULE ORAL 3 TIMES DAILY PRN
Qty: 21 CAPSULE | Refills: 0 | Status: SHIPPED | OUTPATIENT
Start: 2023-04-07 | End: 2023-04-07

## 2023-04-07 RX ORDER — PREDNISONE 20 MG/1
40 TABLET ORAL DAILY
Status: DISCONTINUED | OUTPATIENT
Start: 2023-04-07 | End: 2023-04-07 | Stop reason: HOSPADM

## 2023-04-07 RX ORDER — NICOTINE 21 MG/24HR
1 PATCH, TRANSDERMAL 24 HOURS TRANSDERMAL EVERY 24 HOURS
Qty: 42 PATCH | Refills: 0 | Status: SHIPPED | OUTPATIENT
Start: 2023-04-07 | End: 2023-05-19

## 2023-04-07 RX ORDER — AMLODIPINE BESYLATE 5 MG/1
10 TABLET ORAL DAILY
Qty: 30 TABLET | Refills: 0 | Status: SHIPPED | OUTPATIENT
Start: 2023-04-07 | End: 2023-04-07

## 2023-04-07 RX ORDER — MAGNESIUM SULFATE HEPTAHYDRATE 40 MG/ML
2 INJECTION, SOLUTION INTRAVENOUS ONCE
Status: COMPLETED | OUTPATIENT
Start: 2023-04-07 | End: 2023-04-07

## 2023-04-07 RX ORDER — GUAIFENESIN 600 MG/1
600 TABLET, EXTENDED RELEASE ORAL 2 TIMES DAILY
Qty: 10 TABLET | Refills: 0 | Status: SHIPPED | OUTPATIENT
Start: 2023-04-07 | End: 2023-04-07

## 2023-04-07 RX ADMIN — MAGNESIUM SULFATE HEPTAHYDRATE 2 G: 40 INJECTION, SOLUTION INTRAVENOUS at 10:15

## 2023-04-07 RX ADMIN — PREDNISONE 40 MG: 20 TABLET ORAL at 08:15

## 2023-04-07 RX ADMIN — INSULIN HUMAN 20 UNITS: 100 INJECTION, SUSPENSION SUBCUTANEOUS at 08:16

## 2023-04-07 RX ADMIN — LEVALBUTEROL HYDROCHLORIDE 1.25 MG: 1.25 SOLUTION RESPIRATORY (INHALATION) at 11:46

## 2023-04-07 RX ADMIN — METFORMIN HYDROCHLORIDE 500 MG: 500 TABLET, EXTENDED RELEASE ORAL at 18:03

## 2023-04-07 RX ADMIN — IPRATROPIUM BROMIDE 0.5 MG: 0.5 SOLUTION RESPIRATORY (INHALATION) at 11:46

## 2023-04-07 RX ADMIN — CARVEDILOL 12.5 MG: 12.5 TABLET, FILM COATED ORAL at 18:11

## 2023-04-07 RX ADMIN — PANTOPRAZOLE SODIUM 40 MG: 40 TABLET, DELAYED RELEASE ORAL at 08:15

## 2023-04-07 RX ADMIN — CARVEDILOL 12.5 MG: 12.5 TABLET, FILM COATED ORAL at 08:16

## 2023-04-07 RX ADMIN — BENZONATATE 200 MG: 100 CAPSULE ORAL at 13:39

## 2023-04-07 RX ADMIN — DOXYCYCLINE 100 MG: 100 CAPSULE ORAL at 08:15

## 2023-04-07 RX ADMIN — AMLODIPINE BESYLATE 5 MG: 5 TABLET ORAL at 08:16

## 2023-04-07 RX ADMIN — BENZONATATE 200 MG: 100 CAPSULE ORAL at 08:15

## 2023-04-07 RX ADMIN — INSULIN HUMAN 20 UNITS: 100 INJECTION, SUSPENSION SUBCUTANEOUS at 17:59

## 2023-04-07 RX ADMIN — LISINOPRIL 40 MG: 40 TABLET ORAL at 08:15

## 2023-04-07 RX ADMIN — AMOXICILLIN AND CLAVULANATE POTASSIUM 1 TABLET: 875; 125 TABLET, FILM COATED ORAL at 08:15

## 2023-04-07 RX ADMIN — FUROSEMIDE 40 MG: 40 TABLET ORAL at 08:16

## 2023-04-07 RX ADMIN — GUAIFENESIN 600 MG: 600 TABLET ORAL at 08:15

## 2023-04-07 ASSESSMENT — ACTIVITIES OF DAILY LIVING (ADL)
ADLS_ACUITY_SCORE: 23

## 2023-04-07 NOTE — CONSULTS
Tobacco Treatment Consult  4/7/2023, 5:39 PM    Patient Admitted for: Hypomagnesemia [E83.42]  Recurrent pneumonia [J18.9]  Acute bronchitis, unspecified organism [J20.9] on 4/3/2023    History of Tobacco Use:   Tobacco Product(s):cigarette  Amount used: 1/2 to 3/4 pack per day since age of 10  Number of quit attempts in past: zero  Longest period of without use: none  Pharmacotherapy tried in the past: none States that she has never tried anything  Fagerstrom Score: 5 Level of dependence 4-7 moderate  Medical co-morbidities impacting tobacco use: depression, bipolar disorder and chronic pain    Assessment:   Importance of quitting to patient: 7  Current confidence in ability to quit: 3  Readiness to quit/planning to quit: contemplative  Reasons for wanting to quit: health and money  Emotional Triggers:anger, boredom and nervous or stressed  Physical and behavioral triggers: driving, drinking coffee, eating a meal and finishing a task  Nicotine withdrawal symptoms experiencing as an inpatient: irritability and impatience  Current major life stressors: experiencing a major health problem and father is end of life  Barriers to quitting: lives with mother  who still smokes and whom she cares for. Mother asks her to take her out for a smoke    Education done during visit:  -Discussed triggers and response to them  -Discussed benefits to quitting tobacco use  -Educated on physical benefits to health of tobacco cessation  -Identified health alternatives  -Education on use of pharmacotherapy products and discussed options to determine what may work best for patient.  -Discussed barriers to quitting and how patient feels they may overcome them  -Discussed relapse prevention strategies once back in an environment where smoking is allowed.  -Educated on benefits of having a support system and remembering their reasons for quitting  -Initiated self-identifying as a nonsmoker   -Issued tobacco cessation materials and resource  information.    Recommendations:  Smoking Cessation  -In the hospital recommend the patient have the following pharmacotherapy: Patch 21 mg and Lozenge 2 mg every 2-3 hours  -Upon discharge recommend the patient have the following pharmacotherapy: Patch 21 mg and 2 mg lozenge  -Taper NRT recommended after 4-6 weeks of successful cessation.  Patch down to 14 mg for 4 weeks then 7 mg for 4 weeks.   -Continued encouragement from health care providers to quit and stay tobacco free.    Lola was given our service's contact information should she have questions or desire support post-discharge. Will continue to be available to patient throughout hospital stay.    Total 25 minutes spent in smoking cessation, and 30 minutes spent in chart review,care coordination, and documentation.    Mahogany Brewer RT, Chronic Pulmonary Disease Specialist, Certified Tobacco Treatment Specialist  Phone [phone]

## 2023-04-07 NOTE — PLAN OF CARE
Problem: Plan of Care - These are the overarching goals to be used throughout the patient stay.    Goal: Optimal Comfort and Wellbeing  Outcome: Progressing     Problem: Gas Exchange Impaired  Goal: Optimal Gas Exchange  Outcome: Progressing   Goal Outcome Evaluation:    A&Ox4, VSS, and afebrile. K and Mg protocols. Patient on RA and not requiring O2. ACHS and sliding scale given today. 60g carb diet, SBA/independent in the room. Contact precautions maintained. Patient to discharge this evening when she finds a ride to her daughters house in [facility]. All order are in.

## 2023-04-07 NOTE — PROGRESS NOTES
Pt ordered [employer] and liter of regular pop for supper. HS blood sugar 583. Dr. Moran notified and additional insulin order given. Pt irritated about just about everything we try to do or what is given to her in instruction. As blood sugar elevated breeds infection so we are concerned about it.  Blood pressure  Checked several times and clonidine 0.2 given per order and will recheck.

## 2023-04-07 NOTE — PROGRESS NOTES
Pt received one scheduled nebulizer tx at 8pm, all others were refused. Breath sounds are still coarse with expiratory wheezing in the upper airway. Currently on RA. Will follow as needed.

## 2023-04-07 NOTE — PLAN OF CARE
"  Problem: Hyperglycemia  Goal: Blood Glucose Level Within Targeted Range  Outcome: Progressing     Problem: Gas Exchange Impaired  Goal: Optimal Gas Exchange  Intervention: Optimize Oxygenation and Ventilation  Recent Flowsheet Documentation  Taken 4/6/2023 0768 by Lady Lion RN  Head of Bed (HOB) Positioning: HOB at 30-45 degrees    Problem: Hypertension Acute  Goal: Blood Pressure Within Desired Range  Outcome: Progressing     Pt A/O x 4. Denies pain other than when she is coughing. VSS on RA during shift pt is hypertensive through out shift today managing with prn and scheduled meds. MD notified along with cross over MD this evening. Pt also has been very agitated through out shift today at times due to her diet restriction (60 g carb) pt states \"I will not get full on this diet\" pt educated on importance of restriction and why this has been implemented. Pt refused to order from the hospital and ordered door dash. ACHS blood sugars highest 388 today correcting currently with sliding scale. continues on po steroids which is a contributing factor. Mag and K protocols in place with recheck tomorrow. Pt voiding spontaneously. Tolerating po intake. Able to make needs known. Pt refused alarms ambulating in the room independently even after education. Discharge pending clinical progression.      "

## 2023-04-07 NOTE — DISCHARGE SUMMARY
[facility]  Hospitalist Discharge Summary      Date of Admission:  4/3/2023  Date of Discharge:  4/7/2023  Discharging Provider: Nick Adrian DO  Discharge Service: Hospitalist Service    Discharge Diagnoses    Primary diagnoses:  Acute COPD exacerbation  Acute respiratory failure with hypoxia  Acute bronchitis, bronchiolitis  Elevated D-dimer    Other pertinent diagnoses:  Hypomagnesemia  Hypokalemia  Acute kidney injury on chronic kidney disease stage II  Essential hypertension  Normocytic anemia  History of coronary artery disease  Chronic heart failure with reduced ejection fraction  Uncontrolled diabetes mellitus type 2  Steroid-induced hyperglycemia  Elevated lactic acid level  History of peptic ulcer disease  History of cerebrovascular accident with left facial droop and dysarthria  Mood disorder  Bipolar disorder  Anxiety disorder  Tobacco abuse    Follow-ups Needed After Discharge   Follow-up Appointments     Follow-up and recommended labs and tests       Follow up with primary care provider, MISTY CONDE, within 7 days for   hospital follow- up, COPD exacerbation, steroid-induced hyperglycemia,   diabetes mellitus type 2, hypertension.  No follow up labs or test are   needed.             Unresulted Labs Ordered in the Past 30 Days of this Admission     Date and Time Order Name Status Description    4/5/2023 10:54 AM MRSA Culture Reflex Preliminary     4/3/2023  9:58 PM Blood Culture Peripheral Blood Preliminary     4/3/2023  9:58 PM Blood Culture Peripheral Blood Preliminary       These results will be followed up by PCP    Discharge Disposition   Discharged to home  Condition at discharge: Stable    Hospital Course   52-year-old female with history of obesity, essential hypertension, suspected COPD but not confirmed by PFT, chronic heart failure with reduced ejection fraction, diabetes mellitus type 2, adjustment disorder and depression, polysubstance abuse including cocaine  abuse and tobacco abuse, peptic ulcer disease, anxiety, and gastroesophageal reflux disease presenting with acute respiratory failure with hypoxia and COPD exacerbation.  CT scan consistent with acute bronchitis bronchiolitis.   SARS-CoV-2 PCR, influenza a and B and RSV were all negative.  Patient was initiated on Zosyn and vancomycin for possible early pneumonia.  She was initiated on IV Solu-Medrol and nebulizer treatments with Xopenex and ipratropium.  At time of discharge patient was not requiring supplemental oxygen.  She had significant cough which improved with treatment with nebulizer treatment steroids and cough suppression medication she will continue Augmentin and doxycycline for 7-day course as patient did test positive for MRSA during hospitalization.  Patient had mild acute kidney injury on chronic kidney disease which was improving at time of discharge likely secondary to decreased oral intake due to COPD exacerbation.  Patient's blood pressure medications were adjusted during hospitalization including increasing amlodipine to 10 mg daily.  Patient was advised to follow-up with primary care provider next week.  On prednisone patient had significant increase in serum glucose levels.  She was recommended resuming metformin and starting glipizide daily as she did not wish to initiate subcu insulin treatment at home.    Consultations This Hospital Stay   SPEECH LANGUAGE PATH ADULT IP CONSULT  PHARMACY TO DOSE VANCO    Code Status   Full Code    Time Spent on this Encounter   INick DO, personally saw the patient today and spent greater than 30 minutes discharging this patient.       Nick Adrian DO  [address] [address]  Phone: [phone]  Fax: [phone]  ______________________________________________________________________    Physical Exam   Vital Signs: Temp: 97.3  F (36.3  C) Temp src: Oral BP: 132/71  Pulse: 65   Resp: 21 SpO2: 95 % O2 Device: None (Room air)    Weight: 172 lbs 11.2 oz    Constitutional: awake, alert  Eyes: lids and lashes normal and extra-ocular muscles intact  ENT: normocepalic, without obvious abnormality, atramatic  Respiratory: On room air. Decreased breath sounds, end expiratory wheezing, no signs of respiratory distress   Cardiovascular: RRR no murmurs  GI: normal bowel sounds, soft, non-distended, non-tender  Skin: normal skin color, texture, turgor  Musculoskeletal: no lower extremity pitting edema present  Neurologic: awake and alert       Primary Care Physician   MISTY CONDE    Discharge Orders      Reason for your hospital stay    COPD exacerbation, acute respiratory failure with hypoxia, bronchitis, steroid-induced hyperglycemia     Follow-up and recommended labs and tests     Follow up with primary care provider, MISTY CONDE, within 7 days for hospital follow- up, COPD exacerbation, steroid-induced hyperglycemia, diabetes mellitus type 2, hypertension.  No follow up labs or test are needed.     Activity    Your activity upon discharge: activity as tolerated     When to contact your care team    Call your primary doctor if you have any of the following: temperature greater than 100.4 degrees Fahrenheit or  increased shortness of breath.     Diet    Follow this diet upon discharge: Orders Placed This Encounter      Consistent Carbohydrate Diabetic diet.       Significant Results and Procedures   Most Recent 3 BMP's:Recent Labs   Lab Test 04/07/23  1222 04/07/23  0755 04/07/23  0754 04/07/23  0157 04/06/23  1133 04/06/23  0846 04/05/23  1246 04/05/23  0939 04/05/23  0115 04/04/23  2358   NA  --   --   --   --   --  135*  --  136  --  135*   POTASSIUM  --   --  4.0  --   --  4.5  --  4.5  --  4.8   CHLORIDE  --   --   --   --   --  105  --  105  --  104   CO2  --   --   --   --   --  22  --  23  --  20*   BUN  --   --   --   --   --  25*  --  31*  --  32*   CR  --   --  0.89  --   --  1.19*   --  1.21*  --  1.44*   ANIONGAP  --   --   --   --   --  8  --  8  --  11   HARLEY  --   --   --   --   --  9.5  --  9.4  --  9.3   * 276*  --  289*   < > 295*   < > 190*   < > 227*    < > = values in this interval not displayed.     Most Recent 2 LFT's:Recent Labs   Lab Test 04/04/23  2358 04/04/23  0622   AST 19 16   ALT 18 19   ALKPHOS 66 77   BILITOTAL 0.5 0.6     Most Recent 3 BNP's:No lab results found.  Most Recent D-dimer:Recent Labs   Lab Test 04/03/23 2026   DD 1.34*     7-Day Micro Results     Collected Updated Procedure Result Status      04/05/2023 0103 04/05/2023 1054 MRSA MSSA PCR, Nasal Swab [[id number]]    (Abnormal)   Swab from Nares, Bilateral    Final result Component Value   MRSA Target DNA Positive   SA Target DNA Positive            04/05/2023 0103 04/06/2023 1133 MRSA Culture Reflex [[id number]]   Swab from Nares, Bilateral    Preliminary result Component Value   Culture Culture in progress  [P]                04/04/2023 0655 04/04/2023 0941 Legionella pneumophila antigen urine [[id number]]   Urine, Midstream    Final result Component Value   Legionella pneumophila serogroup 1 urinary antigen Negative   Suggests no recent or current infection. Infection due to Legionella cannot be ruled out, since other serogroups and species may cause disease, antigen may not be present in urine in early infection, and the level of antigen present in the urine may be below detectable limits of the test.            04/04/2023 0655 04/04/2023 0941 Streptococcus pneumoniae antigen [[id number]]   Urine, Midstream    Final result Component Value   Streptococcus pneumoniae antigen Negative   A negative Streptococcus pneumoniae antigen result does not rule out infection with Streptococcus pneumoniae.            04/04/2023 0318 04/04/2023 1323 Respiratory Panel PCR [[id number]]    (Abnormal)   Swab from Nasopharyngeal    Final result Component Value   Adenovirus Invalid   Unable to interpret due to  internal control failure. Recommend recollecting the specimen.   Coronavirus Invalid   Unable to interpret due to internal control failure. Recommend recollecting the specimen.   Human Metapneumovirus Invalid   Unable to interpret due to internal control failure. Recommend recollecting the specimen.   Human Rhin/Enterovirus Invalid   Unable to interpret due to internal control failure. Recommend recollecting the specimen.   Influenza A Invalid   Unable to interpret due to internal control failure. Recommend recollecting the specimen.   Influenza A, H1 Invalid   Unable to interpret due to internal control failure. Recommend recollecting the specimen.   Influenza A 2009 H1N1 Invalid   Unable to interpret due to internal control failure. Recommend recollecting the specimen.   Influenza A, H3 Invalid   Unable to interpret due to internal control failure. Recommend recollecting the specimen.   Influenza B Invalid   Unable to interpret due to internal control failure. Recommend recollecting the specimen.   Parainfluenza Virus 1 Invalid   Unable to interpret due to internal control failure. Recommend recollecting the specimen.   Parainfluenza Virus 2 Invalid   Unable to interpret due to internal control failure. Recommend recollecting the specimen.   Parainfluenza Virus 3 Invalid   Unable to interpret due to internal control failure. Recommend recollecting the specimen.   Parainfluenza Virus 4 Invalid   Unable to interpret due to internal control failure. Recommend recollecting the specimen.   Respiratory Syncytial Virus A Invalid   Unable to interpret due to internal control failure. Recommend recollecting the specimen.   Respiratory Syncytial Virus B Invalid   Unable to interpret due to internal control failure. Recommend recollecting the specimen.   Chlamydia Pneumoniae Invalid   Unable to interpret due to internal control failure. Recommend recollecting the specimen.   Mycoplasma Pneumoniae Invalid   Unable to  interpret due to internal control failure. Recommend recollecting the specimen.            04/03/2023 2257 04/07/2023 0932 Blood Culture Peripheral Blood [[id number]]   Peripheral Blood    Preliminary result Component Value   Culture No growth after 3 days  [P]                04/03/2023 2232 04/07/2023 0001 Blood Culture Peripheral Blood [[id number]]   Peripheral Blood    Preliminary result Component Value   Culture No growth after 3 days  [P]                04/03/2023 2026 04/03/2023 2114 Symptomatic Influenza A/B, RSV, & SARS-CoV2 PCR (COVID-19) Nasopharyngeal [[id number]]    Swab from Nasopharyngeal    Final result Component Value   Influenza A PCR Negative   Influenza B PCR Negative   RSV PCR Negative   SARS CoV2 PCR Negative   NEGATIVE: SARS-CoV-2 (COVID-19) RNA not detected, presumed negative.                Most Recent 6 glucoses:Recent Labs   Lab Test 04/07/23  1222 04/07/23  0755 04/07/23  0157 04/06/23  2121 04/06/23  1730 04/06/23  1133   * 276* 289* 583* 388* 271*     Most Recent ESR & CRP:Recent Labs   Lab Test 03/01/20  0659   CRP 1.3*     Most Recent Anemia Panel:Recent Labs   Lab Test 04/06/23  0846 01/31/20  0853 01/30/20  2334   WBC 7.8   < >  --    HGB 10.6*   < >  --    HCT 31.0*   < >  --    MCV 95   < >  --       < >  --    DINO  --   --  44    < > = values in this interval not displayed.   ,   Results for orders placed or performed during the hospital encounter of 04/03/23   Chest XR,  PA & LAT    Narrative    EXAM: XR CHEST 2 VIEWS  LOCATION: [facility]  DATE/TIME: 4/3/2023 6:57 PM    INDICATION: Shortness of breath and tachypnea.  COMPARISON: 01/24/2023.      Impression    IMPRESSION: Small lung volumes and mild lingular atelectasis. No discrete airspace consolidation. No pleural effusion or pneumothorax.    Upper limits of normal heart size. Atherosclerosis of the thoracic aorta.    Cholecystectomy.   CT Chest Pulmonary Embolism w Contrast     Narrative    EXAM: CT CHEST PULMONARY EMBOLISM W CONTRAST  LOCATION: [facility]  DATE/TIME: 4/3/2023 9:36 PM    INDICATION: dyspnea, tachycardia, elevated D dimer  COMPARISON: 12/02/2022  TECHNIQUE: CT chest pulmonary angiogram during arterial phase injection of IV contrast. Multiplanar reformats and MIP reconstructions were performed. Dose reduction techniques were used.   CONTRAST: ISOVUE 370 100mL    FINDINGS:  ANGIOGRAM CHEST: Pulmonary arteries are normal caliber and negative for pulmonary emboli. Thoracic aorta is negative for dissection. No CT evidence of right heart strain.    LUNGS AND PLEURA: Marked diffuse bronchial wall thickening. Scattered small groundglass nodules in the left lower lobe; for example, image 105:8. No pleural effusion. A stable benign 0.4 cm right upper lobe solid perifissural nodule, image 63:8.    MEDIASTINUM/AXILLAE: Left atrial enlargement. Mediastinal and hilar lymphadenopathy is similar to prior with the largest being a left hilar node measuring 1.4 cm in short axis, image 136:7.    CORONARY ARTERY CALCIFICATION: None.    UPPER ABDOMEN: Cholecystectomy. Hepatic steatosis.    MUSCULOSKELETAL: Mild degenerative changes.      Impression    IMPRESSION:  1.  No pulmonary embolus.  2.  Marked bronchial wall thickening.  3.  Small groundglass nodules in the left lower lobe either represent infectious bronchiolitis or aspiration.  4.  Stable mediastinal and hilar lymphadenopathy.       Discharge Medications   Current Discharge Medication List      START taking these medications    Details   amoxicillin-clavulanate (AUGMENTIN) 875-125 MG tablet Take 1 tablet by mouth every 12 hours for 7 doses  Qty: 7 tablet, Refills: 0    Associated Diagnoses: COPD exacerbation (H)      benzonatate (TESSALON) 100 MG capsule Take 1 capsule (100 mg) by mouth 3 times daily as needed for cough  Qty: 21 capsule, Refills: 0    Associated Diagnoses: COPD exacerbation (H)       doxycycline hyclate (VIBRAMYCIN) 100 MG capsule Take 1 capsule (100 mg) by mouth every 12 hours for 7 doses  Qty: 7 capsule, Refills: 0    Associated Diagnoses: COPD exacerbation (H)      fluticasone-salmeterol (ADVAIR) 250-50 MCG/ACT inhaler Inhale 1 puff into the lungs every 12 hours  Qty: 60 each, Refills: 0    Associated Diagnoses: Chronic obstructive pulmonary disease, unspecified COPD type (H)      glipiZIDE (GLUCOTROL) 5 MG tablet Take 1 tablet (5 mg) by mouth every morning (before breakfast)  Qty: 5 tablet, Refills: 0    Associated Diagnoses: Resistant hypertension      guaiFENesin (MUCINEX) 600 MG 12 hr tablet Take 1 tablet (600 mg) by mouth 2 times daily  Qty: 10 tablet, Refills: 0    Associated Diagnoses: COPD exacerbation (H)      predniSONE (DELTASONE) 20 MG tablet Take 2 tablets (40 mg) by mouth daily  Qty: 8 tablet, Refills: 0    Associated Diagnoses: COPD exacerbation (H)         CONTINUE these medications which have CHANGED    Details   amLODIPine (NORVASC) 5 MG tablet Take 2 tablets (10 mg) by mouth daily  Qty: 30 tablet, Refills: 0    Associated Diagnoses: Resistant hypertension         CONTINUE these medications which have NOT CHANGED    Details   acetaminophen (TYLENOL) 325 MG tablet Take 650 mg by mouth every 6 hours as needed for mild pain      albuterol (PROAIR HFA/PROVENTIL HFA/VENTOLIN HFA) 108 (90 Base) MCG/ACT inhaler Inhale 2 puffs into the lungs every 6 hours as needed for shortness of breath / dyspnea or wheezing  Qty: 1 Inhaler, Refills: 1    Comments: Pharmacy may dispense brand covered by insurance (Proair, or proventil or ventolin or generic albuterol inhaler)  Associated Diagnoses: Chronic obstructive pulmonary disease, unspecified COPD type (H)      carvedilol (COREG) 3.125 MG tablet Take 2 tablets (6.25 mg) by mouth 2 times daily (with meals)  Qty: 120 tablet, Refills: 3    Associated Diagnoses: Chronic congestive heart failure, unspecified heart failure type (H)       cyclobenzaprine (FLEXERIL) 5 MG tablet Take 1-2 tablets (5-10 mg) by mouth 2 times daily as needed for muscle spasms  Qty: 30 tablet, Refills: 0    Associated Diagnoses: Chronic bilateral low back pain without sciatica      furosemide (LASIX) 40 MG tablet Take 1 tablet (40 mg) by mouth daily  Qty: 60 tablet, Refills: 3    Associated Diagnoses: Chronic congestive heart failure, unspecified heart failure type (H)      lisinopril (ZESTRIL) 40 MG tablet Take 40 mg by mouth daily      metFORMIN (GLUCOPHAGE-XR) 500 MG 24 hr tablet Take 1 tablet (500 mg) by mouth daily (with dinner)  Qty: 60 tablet, Refills: 5    Associated Diagnoses: Diabetes mellitus without complication (H)      nitroGLYcerin (NITROSTAT) 0.4 MG sublingual tablet Place 1 tablet (0.4 mg) under the tongue every 5 minutes as needed for chest pain If you are still having symptoms after 3 doses (15 minutes) call 911.  Qty: 25 tablet, Refills: 3    Associated Diagnoses: Chest pain, unspecified type      omeprazole (PRILOSEC) 20 MG DR capsule Take 1 capsule (20 mg) by mouth daily  Qty: 30 capsule, Refills: 3    Associated Diagnoses: Gastroesophageal reflux disease without esophagitis      STATIN NOT PRESCRIBED, INTENTIONAL, Statin not prescribed intentionally due to Other LDL wnl, ASCVD <7.5% (This option does not exclude patient from measure)  Qty: 0 each, Refills: 0    Associated Diagnoses: Type 2 diabetes mellitus without complication, without long-term current use of insulin (H)           Allergies   Allergies   Allergen Reactions     Nkda [No Known Drug Allergies]

## 2023-04-07 NOTE — PROGRESS NOTES
Notified provider because blood sugar was above 350, provider said okay to discharge and encourage pt to see primary provider.

## 2023-04-07 NOTE — PLAN OF CARE
Goal Outcome Evaluation:    Pt comfortable this shift. Slept well and vitally stable.   Room air this shift.   Lisa Ndiaye RN

## 2023-04-07 NOTE — PROGRESS NOTES
Pt seen on RA, breath sounds crackles;diminished. Pt refused two of the three neb treatments today.   RT will continue to monitor    Tena Cobos, RT

## 2023-04-08 NOTE — PLAN OF CARE
Goal Outcome Evaluation:       Pt A&Ox4 and VSS expect for hypertension. Hypertension scheduled medication given. Blood sugar above 350 and paged provider. See provider notification note. Pt discharge education given and pt discharged.         Problem: Plan of Care - These are the overarching goals to be used throughout the patient stay.    Goal: Optimal Comfort and Wellbeing  Outcome: Progressing     Problem: Plan of Care - These are the overarching goals to be used throughout the patient stay.    Goal: Readiness for Transition of Care  Outcome: Progressing

## 2023-04-09 LAB
BACTERIA BLD CULT: NO GROWTH
BACTERIA BLD CULT: NO GROWTH

## 2023-04-10 ENCOUNTER — PATIENT OUTREACH (OUTPATIENT)
Dept: CARE COORDINATION | Facility: CLINIC | Age: 53
End: 2023-04-10
Payer: COMMERCIAL

## 2023-05-29 ENCOUNTER — APPOINTMENT (OUTPATIENT)
Dept: CT IMAGING | Facility: CLINIC | Age: 53
End: 2023-05-29
Attending: STUDENT IN AN ORGANIZED HEALTH CARE EDUCATION/TRAINING PROGRAM
Payer: OTHER MISCELLANEOUS

## 2023-05-29 ENCOUNTER — APPOINTMENT (OUTPATIENT)
Dept: RADIOLOGY | Facility: CLINIC | Age: 53
End: 2023-05-29
Attending: STUDENT IN AN ORGANIZED HEALTH CARE EDUCATION/TRAINING PROGRAM
Payer: OTHER MISCELLANEOUS

## 2023-05-29 ENCOUNTER — HOSPITAL ENCOUNTER (EMERGENCY)
Facility: CLINIC | Age: 53
Discharge: HOME | End: 2023-05-30
Attending: STUDENT IN AN ORGANIZED HEALTH CARE EDUCATION/TRAINING PROGRAM | Admitting: STUDENT IN AN ORGANIZED HEALTH CARE EDUCATION/TRAINING PROGRAM
Payer: OTHER MISCELLANEOUS

## 2023-05-29 DIAGNOSIS — R07.81 RIB PAIN: ICD-10-CM

## 2023-05-29 PROCEDURE — 71101 X-RAY EXAM UNILAT RIBS/CHEST: CPT | Mod: LT

## 2023-05-29 PROCEDURE — 96374 THER/PROPH/DIAG INJ IV PUSH: CPT

## 2023-05-29 PROCEDURE — 99285 EMERGENCY DEPT VISIT HI MDM: CPT | Mod: 25

## 2023-05-29 PROCEDURE — 250N000011 HC RX IP 250 OP 636: Performed by: STUDENT IN AN ORGANIZED HEALTH CARE EDUCATION/TRAINING PROGRAM

## 2023-05-29 PROCEDURE — 71250 CT THORAX DX C-: CPT

## 2023-05-29 RX ORDER — HYDROCODONE BITARTRATE AND ACETAMINOPHEN 5; 325 MG/1; MG/1
1 TABLET ORAL EVERY 6 HOURS PRN
Qty: 18 TABLET | Refills: 0 | Status: SHIPPED | OUTPATIENT
Start: 2023-05-29 | End: 2023-06-03

## 2023-05-29 RX ORDER — HYDROMORPHONE HYDROCHLORIDE 1 MG/ML
0.3 INJECTION, SOLUTION INTRAMUSCULAR; INTRAVENOUS; SUBCUTANEOUS ONCE
Status: COMPLETED | OUTPATIENT
Start: 2023-05-29 | End: 2023-05-30

## 2023-05-29 RX ADMIN — HYDROMORPHONE HYDROCHLORIDE 1 MG: 1 INJECTION, SOLUTION INTRAMUSCULAR; INTRAVENOUS; SUBCUTANEOUS at 21:13

## 2023-05-29 ASSESSMENT — ACTIVITIES OF DAILY LIVING (ADL)
ADLS_ACUITY_SCORE: 35
ADLS_ACUITY_SCORE: 35

## 2023-05-29 ASSESSMENT — ENCOUNTER SYMPTOMS: ABDOMINAL PAIN: 0

## 2023-05-29 NOTE — ED TRIAGE NOTES
Pt arrives to ED with c/o left sided rib injury that occurred at work around 1730. Pt states she fell into a metal display stand.      Triage Assessment     Row Name 05/29/23 5558       Triage Assessment (Adult)    Airway WDL WDL       Respiratory WDL    Respiratory WDL WDL       Skin Circulation/Temperature WDL    Skin Circulation/Temperature WDL WDL       Cardiac WDL    Cardiac WDL WDL       Peripheral/Neurovascular WDL    Peripheral Neurovascular WDL WDL       Cognitive/Neuro/Behavioral WDL    Cognitive/Neuro/Behavioral WDL WDL

## 2023-05-30 VITALS
OXYGEN SATURATION: 97 % | RESPIRATION RATE: 18 BRPM | TEMPERATURE: 97 F | SYSTOLIC BLOOD PRESSURE: 137 MMHG | DIASTOLIC BLOOD PRESSURE: 61 MMHG | HEART RATE: 68 BPM

## 2023-05-30 PROCEDURE — 96376 TX/PRO/DX INJ SAME DRUG ADON: CPT

## 2023-05-30 PROCEDURE — 250N000011 HC RX IP 250 OP 636: Performed by: STUDENT IN AN ORGANIZED HEALTH CARE EDUCATION/TRAINING PROGRAM

## 2023-05-30 RX ADMIN — HYDROMORPHONE HYDROCHLORIDE 0.3 MG: 1 INJECTION, SOLUTION INTRAMUSCULAR; INTRAVENOUS; SUBCUTANEOUS at 00:12

## 2023-05-30 NOTE — ED PROVIDER NOTES
EMERGENCY DEPARTMENT ENCOUNTER      NAME: Lola Arias  AGE: 52 year old female  YOB: 1970  MRN: [id number]  EVALUATION DATE & TIME: 2023  8:47 PM    PCP: David Armijo    ED PROVIDER: Jeronimo Cleveland M.D.      Chief Complaint   Patient presents with     Rib Injury         FINAL IMPRESSION:  1. Rib pain          ED COURSE & MEDICAL DECISION MAKIN:02 PM Met with and examined the patient.    Pertinent Labs & Imaging studies reviewed. (See chart for details)  52 year old female presents to the Emergency Department for evaluation of rib pain.  Patient minor mechanism fall but significant pain over the lower ribs.  Because of the amount of pain I was concerned for the possibility of occult fracture or other intra-abdominal intrathoracic injury although I felt this was less likely based on mechanism.  CT scan of the chest abdomen pelvis are normal.  Still suspect small fracture.  We will give the patient a final dose of pain meds and discharge her with a prescription for Vicodin.    At the conclusion of the encounter I discussed the results of all of the tests and the disposition. The questions were answered. The patient or family acknowledged understanding and was agreeable with the care plan.              Medical Decision Making    History:    Supplemental history from: Documented in chart, if applicable    External Record(s) reviewed: Documented in chart, if applicable.    Work Up:    Chart documentation includes differential considered and any EKGs or imaging independently interpreted by provider, where specified.    In additional to work up documented, I considered the following work up: Documented in chart, if applicable.    External consultation:    Discussion of management with another provider: Documented in chart, if applicable    Complicating factors:    Care impacted by chronic illness: Chronic Pain, Diabetes and Hypertension    Care affected by social determinants of health: Alcohol  Abuse and/or Recreational Drug Use    Disposition considerations: Discharge. I prescribed additional prescription strength medication(s) as charted. See documentation for any additional details.           minutes of critical care time     MEDICATIONS GIVEN IN THE EMERGENCY:  Medications   HYDROmorphone (PF) (DILAUDID) injection 0.3 mg (has no administration in time range)   HYDROmorphone (DILAUDID) injection 1 mg (1 mg Intravenous $Given 5/29/23 2116)       NEW PRESCRIPTIONS STARTED AT TODAY'S ER VISIT  New Prescriptions    HYDROCODONE-ACETAMINOPHEN (NORCO) 5-325 MG TABLET    Take 1 tablet by mouth every 6 hours as needed for pain          =================================================================    HPI    Patient information was obtained from: Patient    Use of : N/A        Lola Arias is a 52 year old female with a pertinent history of chronic back pain, HTN, and diabetes who presents for evaluation of a left rib injury.    At 5:30 PM (4 hours ago), the patient fell at work and fell into a metal display rack on her left side. She described that she was sitting on a stack of water that was 3 feet high. She states that she cannot move and the left side of her ribs hurt. She denies significant abdominal pain.     REVIEW OF SYSTEMS   Review of Systems   Gastrointestinal: Negative for abdominal pain (no significant abdominal pain).   Musculoskeletal:        Positive for chest wall pain   All other systems reviewed and are negative.      PAST MEDICAL HISTORY:  Past Medical History:   Diagnosis Date     Acute bronchitis      Acute upper respiratory infections of unspecified site      Alcohol dependence in remission (H) 11/8/2013     Anxiety state, unspecified      Bipolar disorder, unspecified (H) 11/15/2013     Calculus of kidney      Cervicalgia      Chronic back pain 11/8/2013     Cramp of limb      Diabetes mellitus (H)      Diabetes mellitus (H)      Impaired fasting glucose      Kidney stone       Kidney stones      Myocardial infarction (H)      Obesity, unspecified      Other abnormal Papanicolaou smear of cervix and cervical HPV(795.09)      Thoracic or lumbosacral neuritis or radiculitis, unspecified      Unspecified hypothyroidism        PAST SURGICAL HISTORY:  Past Surgical History:   Procedure Laterality Date     BACK SURGERY      7/2011 and 12/2011. Low back. Done at [facility].     CHOLECYSTECTOMY       CHOLECYSTECTOMY       CV CORONARY ANGIOGRAM N/A 9/5/2019    Procedure: Coronary Angiogram;  Surgeon: Patric Davies MD;  Location: [facility] Cath Lab;  Service: Cardiology     CV LEFT HEART CATHETERIZATION WITHOUT LEFT VENTRICULOGRAM Left 9/5/2019    Procedure: Left Heart Catheterization Without Left Ventriculogram;  Surgeon: Patric Davies MD;  Location: [facility] Cath Lab;  Service: Cardiology     EYE SURGERY       GENITOURINARY SURGERY       KIDNEY STONE SURGERY       ORTHOPEDIC SURGERY       TONSILLECTOMY       TUBAL LIGATION             CURRENT MEDICATIONS:    HYDROcodone-acetaminophen (NORCO) 5-325 MG tablet  acetaminophen (TYLENOL) 325 MG tablet  albuterol (PROAIR HFA/PROVENTIL HFA/VENTOLIN HFA) 108 (90 Base) MCG/ACT inhaler  amLODIPine (NORVASC) 5 MG tablet  amoxicillin-clavulanate (AUGMENTIN) 875-125 MG tablet  benzonatate (TESSALON) 100 MG capsule  carvedilol (COREG) 3.125 MG tablet  cyclobenzaprine (FLEXERIL) 5 MG tablet  doxycycline hyclate (VIBRAMYCIN) 100 MG capsule  fluticasone-salmeterol (ADVAIR) 250-50 MCG/ACT inhaler  furosemide (LASIX) 40 MG tablet  glipiZIDE (GLUCOTROL) 5 MG tablet  guaiFENesin (MUCINEX) 600 MG 12 hr tablet  lisinopril (ZESTRIL) 40 MG tablet  metFORMIN (GLUCOPHAGE-XR) 500 MG 24 hr tablet  nicotine (NICODERM CQ) 14 MG/24HR 24 hr patch  [START ON 6/3/2023] nicotine (NICODERM CQ) 7 MG/24HR 24 hr patch  nitroGLYcerin (NITROSTAT) 0.4 MG sublingual tablet  omeprazole (PRILOSEC) 20 MG DR capsule  predniSONE (DELTASONE) 20 MG tablet  STATIN NOT  PRESCRIBED, INTENTIONAL,        ALLERGIES:  Allergies   Allergen Reactions     Nkda [No Known Drug Allergy]        FAMILY HISTORY:  Family History   Problem Relation Age of Onset     Other Cancer Father         Oral     Hypertension Father      Hypertension Maternal Grandmother      Diabetes Other         Paternal Uncle     Hypertension Other         Paternal Uncle     Asthma No family hx of      Heart Disease Mother      Cancer Mother         uterine     Urolithiasis Paternal Uncle      Diabetes Maternal Grandfather      Urolithiasis Paternal Uncle      Clotting Disorder No family hx of      Gout No family hx of        SOCIAL HISTORY:   Social History     Socioeconomic History     Marital status: Single   Occupational History     Occupation: Employed   Tobacco Use     Smoking status: Every Day     Packs/day: 0.75     Years: 40.00     Pack years: 30.00     Types: Cigarettes     Smokeless tobacco: Never     Tobacco comments:     Seen IP by [facility] on 4/7/23   Vaping Use     Vaping status: Never Used   Substance and Sexual Activity     Alcohol use: No     Alcohol/week: 0.0 standard drinks of alcohol     Drug use: No   Social History Narrative    Education: 12th    Child: Philomena 15       VITALS:  /60   Pulse 68   Temp 97  F (36.1  C) (Temporal)   Resp 18   SpO2 100%       PHYSICAL EXAM    Constitutional: Well developed, Well nourished, NAD, GCS 15  HENT: Normocephalic, Atraumatic, Bilateral external ears normal, Oropharynx normal, mucous membranes moist, Nose normal. Neck-  Normal range of motion, No tenderness, Supple, No stridor.  Eyes: PERRL, EOMI, Conjunctiva normal, No discharge.   Respiratory: Normal breath sounds, No respiratory distress, No wheezing, Speaks full sentences easily. No cough.  Cardiovascular: Normal heart rate, Regular rhythm, No murmurs, No rubs, No gallops. Chest wall nontender.  GI: Soft, No significant tenderness, No masses, No flank tenderness. No rebound or guarding.   Musculoskeletal:  2+ DP pulses. No edema.No cyanosis, No clubbing. Good range of motion in all major joints. No major deformities noted. Pain with palpation of the left lateral lower ribs.  Integument: Warm, Dry, No erythema, No rash. No petechiae.   Neurologic: Alert & oriented x 3,  CN 3-12 intact Normal motor function, Normal sensory function, No focal deficits noted. Normal gait. Normal finger to nose bilaterally  Psychiatric: Affect normal, Judgment normal, Mood normal. Cooperative.          LAB:  All pertinent labs reviewed and interpreted.  Labs Ordered and Resulted from Time of ED Arrival to Time of ED Departure - No data to display    RADIOLOGY:  Reviewed all pertinent imaging. Please see official radiology report.  CT Chest Abdomen Pelvis w/o Contrast   Final Result   IMPRESSION:   1.  Lungs are clear. No pneumothorax.      2.  No noncontrast CT evidence for solid organ injury. No abdominal or pelvic free fluid.      3.   Tiny stone in the lower pole collecting system on the right. No other renal calculi seen. No hydronephrosis.      4.  Mild thoracic and lumbar spondylosis. No fractures seen.      Ribs XR, unilat 3 views + PA chest,  left   Final Result   IMPRESSION: Mild cardiomegaly. Pulmonary vessels upper limits of normal. Lungs appear clear.    Old healed left 10th rib fracture. No acute fractures identified            I, Kimberlyn Aquino, am serving as a scribe to document services personally performed by Dr. Jeronimo Cleveland based on my observation and the provider's statements to me. Jeronimo ALEJANDRA MD attest that Kimberlyn Aquino is acting in a scribe capacity, has observed my performance of the services and has documented them in accordance with my direction.    Jeronimo Cleveland M.D.  Emergency Medicine  [facility] EMERGENCY ROOM  [address] [facility] [address]  [phone]  Dept: [phone]     Jeronimo Cleveland MD  05/29/23 1743

## 2024-03-20 NOTE — TELEPHONE ENCOUNTER
Called to f/u on pt's 2/14 ED visit, to offered assistance w/ health insurance, and info on sliding fee scale clinics. No answer. LVM.    Observation and assessment/Teaching and training

## 2025-02-14 ENCOUNTER — APPOINTMENT (OUTPATIENT)
Dept: CT IMAGING | Facility: HOSPITAL | Age: 55
End: 2025-02-14
Payer: COMMERCIAL

## 2025-02-14 ENCOUNTER — HOSPITAL ENCOUNTER (EMERGENCY)
Facility: HOSPITAL | Age: 55
Discharge: HOME | End: 2025-02-14
Payer: COMMERCIAL

## 2025-02-14 VITALS
SYSTOLIC BLOOD PRESSURE: 168 MMHG | DIASTOLIC BLOOD PRESSURE: 108 MMHG | OXYGEN SATURATION: 95 % | HEART RATE: 83 BPM | RESPIRATION RATE: 19 BRPM

## 2025-02-14 DIAGNOSIS — R07.9 CHEST PAIN, UNSPECIFIED TYPE: ICD-10-CM

## 2025-02-14 DIAGNOSIS — R06.02 SHORTNESS OF BREATH: ICD-10-CM

## 2025-02-14 DIAGNOSIS — I50.9 ACUTE ON CHRONIC CONGESTIVE HEART FAILURE, UNSPECIFIED HEART FAILURE TYPE (H): ICD-10-CM

## 2025-02-14 LAB
ANION GAP SERPL CALCULATED.3IONS-SCNC: 10 MMOL/L (ref 7–15)
BASOPHILS # BLD AUTO: 0.1 10E3/UL (ref 0–0.2)
BASOPHILS NFR BLD AUTO: 1 %
BUN SERPL-MCNC: 9.4 MG/DL (ref 6–20)
CALCIUM SERPL-MCNC: 9.2 MG/DL (ref 8.8–10.4)
CHLORIDE SERPL-SCNC: 97 MMOL/L (ref 98–107)
CREAT SERPL-MCNC: 0.95 MG/DL (ref 0.51–0.95)
D DIMER PPP FEU-MCNC: 1.18 UG/ML FEU (ref 0–0.5)
EGFRCR SERPLBLD CKD-EPI 2021: 71 ML/MIN/1.73M2
EOSINOPHIL # BLD AUTO: 0.2 10E3/UL (ref 0–0.7)
EOSINOPHIL NFR BLD AUTO: 3 %
ERYTHROCYTE [DISTWIDTH] IN BLOOD BY AUTOMATED COUNT: 12.7 % (ref 10–15)
GLUCOSE SERPL-MCNC: 442 MG/DL (ref 70–99)
HCO3 SERPL-SCNC: 25 MMOL/L (ref 22–29)
HCT VFR BLD AUTO: 33.7 % (ref 35–47)
HGB BLD-MCNC: 11.3 G/DL (ref 11.7–15.7)
IMM GRANULOCYTES # BLD: 0 10E3/UL
IMM GRANULOCYTES NFR BLD: 0 %
LYMPHOCYTES # BLD AUTO: 2.3 10E3/UL (ref 0.8–5.3)
LYMPHOCYTES NFR BLD AUTO: 38 %
MCH RBC QN AUTO: 30.7 PG (ref 26.5–33)
MCHC RBC AUTO-ENTMCNC: 33.5 G/DL (ref 31.5–36.5)
MCV RBC AUTO: 92 FL (ref 78–100)
MONOCYTES # BLD AUTO: 0.5 10E3/UL (ref 0–1.3)
MONOCYTES NFR BLD AUTO: 8 %
NEUTROPHILS # BLD AUTO: 3.1 10E3/UL (ref 1.6–8.3)
NEUTROPHILS NFR BLD AUTO: 51 %
NRBC # BLD AUTO: 0 10E3/UL
NRBC BLD AUTO-RTO: 0 /100
NT-PROBNP SERPL-MCNC: 2933 PG/ML (ref 0–900)
PLATELET # BLD AUTO: 188 10E3/UL (ref 150–450)
POTASSIUM SERPL-SCNC: 4.7 MMOL/L (ref 3.4–5.3)
RBC # BLD AUTO: 3.68 10E6/UL (ref 3.8–5.2)
SODIUM SERPL-SCNC: 132 MMOL/L (ref 135–145)
TROPONIN T SERPL HS-MCNC: 28 NG/L
TROPONIN T SERPL HS-MCNC: 29 NG/L
WBC # BLD AUTO: 6.1 10E3/UL (ref 4–11)

## 2025-02-14 PROCEDURE — 85379 FIBRIN DEGRADATION QUANT: CPT

## 2025-02-14 PROCEDURE — 82565 ASSAY OF CREATININE: CPT

## 2025-02-14 PROCEDURE — 83880 ASSAY OF NATRIURETIC PEPTIDE: CPT

## 2025-02-14 PROCEDURE — 93005 ELECTROCARDIOGRAM TRACING: CPT

## 2025-02-14 PROCEDURE — 71275 CT ANGIOGRAPHY CHEST: CPT

## 2025-02-14 PROCEDURE — 85025 COMPLETE CBC W/AUTO DIFF WBC: CPT

## 2025-02-14 PROCEDURE — 250N000011 HC RX IP 250 OP 636

## 2025-02-14 PROCEDURE — 36415 COLL VENOUS BLD VENIPUNCTURE: CPT

## 2025-02-14 PROCEDURE — 99285 EMERGENCY DEPT VISIT HI MDM: CPT | Mod: 25

## 2025-02-14 PROCEDURE — 84484 ASSAY OF TROPONIN QUANT: CPT

## 2025-02-14 PROCEDURE — 250N000013 HC RX MED GY IP 250 OP 250 PS 637

## 2025-02-14 RX ORDER — IOPAMIDOL 755 MG/ML
75 INJECTION, SOLUTION INTRAVASCULAR ONCE
Status: COMPLETED | OUTPATIENT
Start: 2025-02-14 | End: 2025-02-14

## 2025-02-14 RX ORDER — NITROGLYCERIN 0.4 MG/1
0.4 TABLET SUBLINGUAL EVERY 5 MIN PRN
Status: COMPLETED | OUTPATIENT
Start: 2025-02-14 | End: 2025-02-14

## 2025-02-14 RX ORDER — ASPIRIN 325 MG
325 TABLET ORAL ONCE
Status: COMPLETED | OUTPATIENT
Start: 2025-02-14 | End: 2025-02-14

## 2025-02-14 RX ADMIN — IOPAMIDOL 75 ML: 755 INJECTION, SOLUTION INTRAVENOUS at 18:52

## 2025-02-14 RX ADMIN — NITROGLYCERIN 0.4 MG: 0.4 TABLET, ORALLY DISINTEGRATING SUBLINGUAL at 15:52

## 2025-02-14 RX ADMIN — ASPIRIN 325 MG: 325 TABLET ORAL at 15:42

## 2025-02-14 RX ADMIN — NITROGLYCERIN 0.4 MG: 0.4 TABLET, ORALLY DISINTEGRATING SUBLINGUAL at 15:42

## 2025-02-14 RX ADMIN — NITROGLYCERIN 0.4 MG: 0.4 TABLET, ORALLY DISINTEGRATING SUBLINGUAL at 16:08

## 2025-02-14 ASSESSMENT — ACTIVITIES OF DAILY LIVING (ADL)
ADLS_ACUITY_SCORE: 55

## 2025-02-14 ASSESSMENT — COLUMBIA-SUICIDE SEVERITY RATING SCALE - C-SSRS
1. IN THE PAST MONTH, HAVE YOU WISHED YOU WERE DEAD OR WISHED YOU COULD GO TO SLEEP AND NOT WAKE UP?: NO
2. HAVE YOU ACTUALLY HAD ANY THOUGHTS OF KILLING YOURSELF IN THE PAST MONTH?: NO
6. HAVE YOU EVER DONE ANYTHING, STARTED TO DO ANYTHING, OR PREPARED TO DO ANYTHING TO END YOUR LIFE?: NO

## 2025-02-14 NOTE — ED NOTES
Bed: JNED-08  Expected date:   Expected time:   Means of arrival:   Comments:  Jonelle FIORE SOB/CP VSS

## 2025-02-14 NOTE — ED PROVIDER NOTES
"EMERGENCY DEPARTMENT ENCOUNTER      NAME: Lola Arias  AGE: 54 year old female  YOB: 1970  MRN: [id number]  EVALUATION DATE & TIME: 2/14/2025  3:26 PM    PCP: Dvaid Armijo    ED PROVIDER: Deejay Singer MD    FINAL IMPRESSION:  1. Acute on chronic congestive heart failure, unspecified heart failure type (H)    2. Chest pain, unspecified type    3. Shortness of breath        ED COURSE & MEDICAL DECISION MAKING:    Pertinent Labs & Imaging studies reviewed. (See chart for details)  54 year old female presents to the Emergency Department for evaluation of chest pain.  Differential diagnosis considered Myocardial infarction, acute coronary syndrome, congestive heart failure, COPD exacerbation, asthma exacerbation, pneumonia, aspiration, anaphylaxis, pulmonary embolism, pneumothorax, COVID-19, influenza A, anxiety, Myocardial infarction, acute coronary syndrome, congestive heart failure, aortic dissection, esophageal rupture, pulmonary embolism, pneumothorax, cardiac tamponade, cocaine mediated chest pain, endocarditis, GERD, pleurisy, rib fracture, costochondritis, pericarditis, herpes zoster.     Triage Note:      ED Course as of 02/14/25 2138 Fri Feb 14, 2025   1543 Patient with a history of multiple MIs as well as CVAs presenting with chest pain.  Her for the last 2 days she has had substernal chest pain that feels like \"an elephant sitting my chest\".  Intermittently radiates to her back which last minutes.  She has not taken medication to relieve her symptoms.  Went to outside clinic and they called EMS.  On my exam patient is uncomfortable appearing however not diaphoretic.  Substernal chest pressure that radiates to her back.  Not associate with diaphoresis or worsening with exertion.  She does have accompanying shortness of breath.  Will give aspirin and nitroglycerin.  EKG shows nonspecific T wave changes that appear similar to prior EKG.  No STEMI criteria or hyperacute T waves.  Will not call " a code STEMI.  Will obtain repeat EKG labs including a D-dimer.  Of note her right calf is more swollen than her left.  No reported history of DVT/PEs.   1604 EKG shows no significant change   1606 Patient status post 2 nitro with some improvement of her chest pain.   1632 Troponin T, High Sensitivity(!)  29 symptoms been present for the last 2 days.  Anticipate a higher troponin If having ACS.  Will order 2-hour repeat.     1731 Reevaluation of patient at bedside.  Chest pain has been resolved.  Updated that we are going to obtain a CTA chest.  Agrees with plan.   1903 Troponin did not rise, doubt ACS.  So awaiting CT scan of the chest.   1903 Basic metabolic panel(!)  Glucose is elevated however no anion gap, doubt DKA   2005 I reevaluated the patient and updated her on results and next steps.   2007 Reevaluated at Bedside.  Systolic 168.  She does not have chest pain.  Requested be discharged.  I discussed that I do have some concerns given her description of pain and she has higher risk and has an elevated Edac score and would be someone to admit for expedited cardiology workup however she declines.  She understands the risk of being discharged.  Also discussed that she has got a elevated BNP to CHF exacerbation on CT scan discussed that I would like her to increase her Lasix to 60 mg total for the next few days to see if this would help her symptoms.  Patient still would like to be discharged.  Will make a expedited cardiology referral.  She is requesting a work note.  Will discharge her home.   2010 CT Chest Pulmonary Embolism w Contrast  IMPRESSION:  1.  No PE, dissection, or aneurysm.     2.  Findings which can be seen with active CHF which would include tiny bilateral pleural effusions, prominent interlobular septae, and mild cardiac enlargement.     3.  Mild fatty infiltration of the liver.     4.  Mild to moderate nonspecific mosaic perfusion seen in both lungs, which is most typical for air trapping  "secondary to small airway inflammation.         CT Pulmonary Angiogram:The patient had an abnormal d-dimer.    I discussed the plan for discharge with the patient and patient is agreeable. We discussed supportive cares at home and reasons to return to the ER including new or worsening symptoms. All questions and concerns addressed to the best of my ability. Strict return precautions discussed. Patient to be discharge by RN.    At the conclusion of the encounter I discussed the results of the tests and the disposition. The questions were answered. The patient or family acknowledged understanding and was agreeable with the care plan.     MEDICATIONS GIVEN IN THE EMERGENCY:  Medications   nitroGLYcerin (NITROSTAT) sublingual tablet 0.4 mg (0.4 mg Sublingual $Given 2/14/25 1608)   aspirin (ASA) tablet 325 mg (325 mg Oral $Given 2/14/25 1542)   iopamidol (ISOVUE-370) solution 75 mL (75 mLs Intravenous $Given 2/14/25 9772)       NEW PRESCRIPTIONS STARTED AT TODAY'S ER VISIT  Discharge Medication List as of 2/14/2025  8:15 PM        Discharge Medication List as of 2/14/2025  8:15 PM          =================================================================    HPI    Lola Arias is a 54 year old female with a pertinent history of type II diabetes, COPD, CHF, hypertension, CKD, GERD, cocaine abuse, and hypothyroidism who presents to this ED for evaluation of chest pain.     Patient reports shortness of breath and chest pressure since 2/12/2025 (~2 days). Chest pressure has been constant since onset describing it \"an elephant sitting on my chest\". Patient notes periodic episodes of pain radiating in her back that last 1-2 minutes. Chest pain does not radiate into neck or down arm. Shortness of breath worsens with exertion. No lower extremity pain. Patient endorses nausea and lightheadedness, but no vomiting. No abdominal pain. Patient has taken nothing for symptom control.     Patient was seen at [facility] " earlier today for evaluation of current concerns, but was transported here for further evaluation. No work-up completed at clinic per EMS. Patient has chronic deficits from multiple previous MI's and CVAs.    No other symptoms, complaints, or concerns at this time.     Use of : N/A    PHYSICAL EXAM    BP (!) 168/108   Pulse 83   Resp 19   SpO2 95%   Constitutional: Uncomfortable appearing.  Head: Normocephalic, atraumatic, mucous membranes moist, nose normal.   Neck: Supple, gross ROM intact.   Eyes: Pupils mid-range, sclera white.  Respiratory: Clear to auscultation bilaterally, no respiratory distress, no wheezing, speaks full sentences easily.  Cardiovascular: Normal heart rate, regular rhythm, no murmurs. No lower extremity edema.   GI: Soft, no tenderness to deep palpation in all quadrants.  Musculoskeletal: Moving all 4 extremities intentionally and without pain. No obvious deformity.    Skin: Warm, dry, no rash.  Neurologic: Alert & oriented x 3, speech clear, moving all extremities spontaneously   Psychiatric: Affect normal, cooperative.       LAB:  All pertinent labs reviewed and interpreted.  Results for orders placed or performed during the hospital encounter of 02/14/25   CT Chest Pulmonary Embolism w Contrast    Impression    IMPRESSION:  1.  No PE, dissection, or aneurysm.    2.  Findings which can be seen with active CHF which would include tiny bilateral pleural effusions, prominent interlobular septae, and mild cardiac enlargement.    3.  Mild fatty infiltration of the liver.    4.  Mild to moderate nonspecific mosaic perfusion seen in both lungs, which is most typical for air trapping secondary to small airway inflammation.   Basic metabolic panel   Result Value Ref Range    Sodium 132 (L) 135 - 145 mmol/L    Potassium 4.7 3.4 - 5.3 mmol/L    Chloride 97 (L) 98 - 107 mmol/L    Carbon Dioxide (CO2) 25 22 - 29 mmol/L    Anion Gap 10 7 - 15 mmol/L    Urea Nitrogen 9.4 6.0 - 20.0 mg/dL     Creatinine 0.95 0.51 - 0.95 mg/dL    GFR Estimate 71 >60 mL/min/1.73m2    Calcium 9.2 8.8 - 10.4 mg/dL    Glucose 442 (H) 70 - 99 mg/dL   Result Value Ref Range    Troponin T, High Sensitivity 29 (H) <=14 ng/L   D dimer quantitative   Result Value Ref Range    D-Dimer Quantitative 1.18 (H) 0.00 - 0.50 ug/mL FEU   CBC with platelets and differential   Result Value Ref Range    WBC Count 6.1 4.0 - 11.0 10e3/uL    RBC Count 3.68 (L) 3.80 - 5.20 10e6/uL    Hemoglobin 11.3 (L) 11.7 - 15.7 g/dL    Hematocrit 33.7 (L) 35.0 - 47.0 %    MCV 92 78 - 100 fL    MCH 30.7 26.5 - 33.0 pg    MCHC 33.5 31.5 - 36.5 g/dL    RDW 12.7 10.0 - 15.0 %    Platelet Count 188 150 - 450 10e3/uL    % Neutrophils 51 %    % Lymphocytes 38 %    % Monocytes 8 %    % Eosinophils 3 %    % Basophils 1 %    % Immature Granulocytes 0 %    NRBCs per 100 WBC 0 <1 /100    Absolute Neutrophils 3.1 1.6 - 8.3 10e3/uL    Absolute Lymphocytes 2.3 0.8 - 5.3 10e3/uL    Absolute Monocytes 0.5 0.0 - 1.3 10e3/uL    Absolute Eosinophils 0.2 0.0 - 0.7 10e3/uL    Absolute Basophils 0.1 0.0 - 0.2 10e3/uL    Absolute Immature Granulocytes 0.0 <=0.4 10e3/uL    Absolute NRBCs 0.0 10e3/uL   Result Value Ref Range    Troponin T, High Sensitivity 28 (H) <=14 ng/L   Nt probnp inpatient   Result Value Ref Range    N terminal Pro BNP Inpatient 2,933 (H) 0 - 900 pg/mL       RADIOLOGY:  Reviewed all pertinent imaging. Please see official radiology report.  CT Chest Pulmonary Embolism w Contrast   Final Result   IMPRESSION:   1.  No PE, dissection, or aneurysm.      2.  Findings which can be seen with active CHF which would include tiny bilateral pleural effusions, prominent interlobular septae, and mild cardiac enlargement.      3.  Mild fatty infiltration of the liver.      4.  Mild to moderate nonspecific mosaic perfusion seen in both lungs, which is most typical for air trapping secondary to small airway inflammation.          EKG:    Performed at: 15:31    Impression: Sinus  rhythm. Biatrial enlargement. Nonspecific T wave changes. No STEMI.    Rate: 88 bpm  Rhythm: Sinus rhythm.  CT Interval: 180 ms  QRS Interval: 104 ms  QTc Interval: 481 ms  ST Changes: Nonspecific T wave changes.   Comparison: When compared with ECG of 03-Apr-2023, No significant changes were found.    I have independently reviewed and interpreted the EKG(s) documented above.        Deejay Singer MD  [facility] EMERGENCY DEPARTMENT  [address] [place] [id number]  [phone]   =================================================================    BILLING:  Data  Category 1  Non-ED record review, if applicable. External record reviewed: N/A     Clinical information was obtained from an independent historian. History was obtained from: Patient     The following testing was considered but ultimately not selected after discussion with patient/family: N/A     Category 2  My independent interpretation of EKG, rhythm strip, radiology study: CTA Chest did not reveal large bilateral pulmonary embolism     Category 3  Discussion of management with other physician/healthcare provider/other source: N/A       Risk  Prescription medication was considered, but ultimately not given after discussion with patient/family: N/A     Chronic conditions affecting care: Hypertension, Diabetes, COPD, CHF, MI, and CVA     Consideration of Admission/Observation: Offered admission however patient offered admission however patient requested to be discharged.  I discussed I would like her to be admitted for expedited cardiology workup and evaluation and patient declines.          I, Perla Gonzalez, am serving as a scribe to document services personally performed by Deejay Singer MD based on my observation and the provider's statements to me. I, Deejay Singer MD, attest that Perla Gonzalez is acting in a scribe capacity, has observed my performance of the services and has documented them in accordance with  my direction.       Deejay Singer MD  02/14/25 4852

## 2025-02-14 NOTE — Clinical Note
Lola Arias was seen and treated in our emergency department on 2/14/2025.  She may return to work on 02/17/2025.       If you have any questions or concerns, please don't hesitate to call.      Deejay Singer MD

## 2025-02-15 NOTE — DISCHARGE INSTRUCTIONS
Your workup did show evidence of mild CHF.  You are currently taking 1 dose of Lasix.  Please take an additional half dose of Lasix for the next few days to see if this helps improve your symptoms.    You were evaluated in the Emergency Department today for chest pain. Your evaluation has shown no signs of medical conditions requiring emergent intervention at this time, however we recommend that you follow up with your primary care physician or your cardiologist as soon as possible for further testing as an outpatient.    Please schedule an appointment for follow up with your primary care physician as directed.  Please schedule an appointment with for follow-up with your cardiologist as directed.  Please do not perform vigorous exercise until cleared by cardiology.      Return to the Emergency Department if you experience worsening or uncontrolled chest pain, shortness of breath, lightheadedness, feeling faint, nausea, vomiting, or any other concerning symptoms.    Thank you for choosing us for your care.

## 2025-02-20 LAB
ATRIAL RATE - MUSE: 83 BPM
DIASTOLIC BLOOD PRESSURE - MUSE: 81 MMHG
INTERPRETATION ECG - MUSE: NORMAL
P AXIS - MUSE: 54 DEGREES
PR INTERVAL - MUSE: 180 MS
QRS DURATION - MUSE: 104 MS
QT - MUSE: 414 MS
QTC - MUSE: 486 MS
R AXIS - MUSE: 7 DEGREES
SYSTOLIC BLOOD PRESSURE - MUSE: 159 MMHG
T AXIS - MUSE: 28 DEGREES
VENTRICULAR RATE- MUSE: 83 BPM